# Patient Record
Sex: MALE | Race: WHITE | HISPANIC OR LATINO | Employment: FULL TIME | URBAN - METROPOLITAN AREA
[De-identification: names, ages, dates, MRNs, and addresses within clinical notes are randomized per-mention and may not be internally consistent; named-entity substitution may affect disease eponyms.]

---

## 2017-02-02 ENCOUNTER — ALLSCRIPTS OFFICE VISIT (OUTPATIENT)
Dept: OTHER | Facility: OTHER | Age: 27
End: 2017-02-02

## 2017-02-02 DIAGNOSIS — Z21 ASYMPTOMATIC HUMAN IMMUNODEFICIENCY VIRUS (HIV) INFECTION STATUS (HCC): ICD-10-CM

## 2017-02-02 DIAGNOSIS — R31.29 OTHER MICROSCOPIC HEMATURIA: ICD-10-CM

## 2017-05-04 ENCOUNTER — ALLSCRIPTS OFFICE VISIT (OUTPATIENT)
Dept: OTHER | Facility: OTHER | Age: 27
End: 2017-05-04

## 2017-05-04 DIAGNOSIS — Z21 ASYMPTOMATIC HUMAN IMMUNODEFICIENCY VIRUS (HIV) INFECTION STATUS (HCC): ICD-10-CM

## 2017-05-04 DIAGNOSIS — B20 HUMAN IMMUNODEFICIENCY VIRUS (HIV) DISEASE (HCC): ICD-10-CM

## 2017-05-04 DIAGNOSIS — E78.00 PURE HYPERCHOLESTEROLEMIA: ICD-10-CM

## 2017-05-04 DIAGNOSIS — Z11.3 ENCOUNTER FOR SCREENING FOR INFECTIONS WITH PREDOMINANTLY SEXUAL MODE OF TRANSMISSION: ICD-10-CM

## 2017-06-15 ENCOUNTER — ALLSCRIPTS OFFICE VISIT (OUTPATIENT)
Dept: OTHER | Facility: OTHER | Age: 27
End: 2017-06-15

## 2017-06-15 DIAGNOSIS — K76.89 OTHER SPECIFIED DISORDERS OF LIVER: ICD-10-CM

## 2017-06-15 DIAGNOSIS — B20 HUMAN IMMUNODEFICIENCY VIRUS (HIV) DISEASE (HCC): ICD-10-CM

## 2017-06-15 DIAGNOSIS — R94.5 ABNORMAL RESULTS OF LIVER FUNCTION STUDIES: ICD-10-CM

## 2017-10-05 ENCOUNTER — ALLSCRIPTS OFFICE VISIT (OUTPATIENT)
Dept: OTHER | Facility: OTHER | Age: 27
End: 2017-10-05

## 2017-10-05 DIAGNOSIS — B20 HUMAN IMMUNODEFICIENCY VIRUS (HIV) DISEASE (HCC): ICD-10-CM

## 2017-12-19 LAB — HBA1C MFR BLD HPLC: 5.2 %

## 2018-01-09 NOTE — PROGRESS NOTES
Assessment    1  HIV (human immunodeficiency virus infection) (V08) (Z21)   2  Hypercholesteremia (272 0) (E78 00)   3  Benign essential hypertension (401 1) (I10)   4  Microhematuria (599 72) (R31 29)    Plan    1  Tivicay 50 MG Oral Tablet; TAKE 1 TABLET BY MOUTH ONCE DAILY    2  (1) HIV-1 RNA QUANTITATIVE; [Do Not Release]; Status:Active; Requested   for:02Feb2017;     3  Descovy 200-25 MG Oral Tablet; TAKE 1 TABLET BY MOUTH DAILY   4  (1) CBC/PLT/DIFF; Status:Active; Requested for:02Feb2017;    5  (1) CD4-CD8 RATIO PROFILE; Status:Active; Requested for:02Feb2017;    6  (1) COMPREHENSIVE METABOLIC PANEL; Status:Active; Requested for:02Feb2017;    7  (Q) RPR (DX) W/REFL TITER AND CONFIRMATORY TESTING; Status:Active; Requested   for:02Feb2017;    8  Follow-up visit in 3 months Evaluation and Treatment  Follow-up  Status: Hold For -   Scheduling  Requested for: 02Feb2017    Discussion/Summary  Discussion Summary:   HIV-doing well on Tivicay and Descovy with a low level detectable viral load  He remains with 40 copies noted in the serum  This could be related to the fact these taken vitamin D at same time and this may interact  I told the patient to take the vitamin D and the fish oil and evening  The CD4 count remains over 1000  Stressed adherence with the patient  Recheck labs in 2 months and follow-up in 3 months    Dyslipidemia-improved with his cholesterol falling below 200 on Lipitor  His triglycerides remain quite high although they've improved  Continue Lipitor as per the primary service  There is no interaction with the Lipitor and his ART  Hypertension-asymptomatic  We'll refer back to primary M D  Microhematuria-recurrent  Asymptomatic  Discussed with patient in detail  Recommend patient see care with the urologist  He will proceed with making an appointment with urology  Patient's Capacity to Self-Care: Patient is able to Self-Care     Medication SE Review and Pt Understands Tx: Possible side effects of new medications were reviewed with the patient/guardian today  The treatment plan was reviewed with the patient/guardian  The patient/guardian understands and agrees with the treatment plan   Counseling Documentation With Imm: The patient was counseled regarding diagnostic results, instructions for management, prognosis, patient and family education, risks and benefits of treatment options, importance of compliance with treatment  Chief Complaint  Chief Complaint Free Text Note Form: Pt here for 6 mo f/u for HIV  Needs refills  Admits to smoking marijuana a few days a week  History of Present Illness  HPI: Routine follow-up for HIV  Patient claims almost 100% adherence with Tivicay and Descovy  He did miss one dose when he just completely forgot  He denies any notable side effects  He has been taking vitamin D and Fish oil at the same time of his ART  He is overall feeling well  He denies any notable side effects from his medications  Hospital Based Practices Required Assessment:   Pain Assessment   the patient states they do not have pain  Review of Systems  Complete-Male:   Constitutional: No fever or chills, feels well, no tiredness, no recent weight gain or weight loss  Cardiovascular: No complaints of slow heart rate, no fast heart rate, no chest pain, no palpitations, no leg claudication, no lower extremity  Respiratory: No complaints of shortness of breath, no wheezing, no cough, no SOB on exertion, no orthopnea or PND  Gastrointestinal: No complaints of abdominal pain, no constipation, no nausea or vomiting, no diarrhea or bloody stools  Genitourinary: No complaints of dysuria, no incontinence, no hesitancy, no nocturia, no genital lesion, no testicular pain  Musculoskeletal: No complaints of arthralgia, no myalgias, no joint swelling or stiffness, no limb pain or swelling  Active Problems    1  ADD (attention deficit disorder) (314 00) (F98 8)   2   Asthma (554 90) (J45 909)   3  Depression (311) (F32 9)   4  HIV (human immunodeficiency virus infection) (V08) (Z21)   5  Morbid obesity (278 01) (E66 01)   6  Morbid obesity (278 01) (E66 01)    Past Medical History    1  History of No significant past medical history    Surgical History    1  History of Oral Surgery Tooth Extraction   2  History of Tonsillectomy    Family History  Mother    1  Family history of Hypercholesteremia  Father    2  Family history of Diabetes   3  Family history of Hypercholesteremia   4  Family history of Hypertension  Family History Reviewed: The family history was reviewed and updated today  Social History    · Current every day smoker (305 1) (F17 200)   · Denied: History of Drug use   · Nicotine vapor product user (V49 89) (Z78 9)   · Recently stopped smoking   · Social alcohol use (Z78 9)  Social History Reviewed: The social history was reviewed and updated today  Current Meds   1  Albuterol AERS; Therapy: (Recorded:07Mwi7213) to Recorded   2  ClonazePAM 0 25 MG Oral Tablet Dispersible; TAKE 1 TABLET Bedtime; Therapy: (Recorded:02Oih1184) to Recorded   3  Lexapro 10 MG Oral Tablet; TAKE 1 TABLET DAILY; Therapy: (Recorded:47Req4255) to Recorded   4  Lipitor TABS; TAKE 1 TABLET DAILY; Therapy: (Recorded:39Tmn5367) to Recorded   5  Singulair 10 MG Oral Tablet; TAKE 1 TABLET DAILY AS DIRECTED; Therapy: (Recorded:99Pvl0764) to Recorded   6  Tivicay 50 MG Oral Tablet; TAKE 1 TABLET BY MOUTH ONCE DAILY; Therapy: (Recorded:91Tzi4400) to Recorded   7  Vyvanse 50 MG Oral Capsule; TAKE 1 CAPSULE DAILY IN THE MORNING; Therapy: (Recorded:17Pai0430) to Recorded    Allergies    1   No Known Drug Allergies    Vitals  Signs   Recorded: 13TVS4785 03:53PM   Temperature: 97 2 F, Tympanic  Heart Rate: 76  Respiration: 16  Systolic: 519  Diastolic: 90  BP Cuff Size: Large  Height: 5 ft 10 in  Weight: 293 lb 3 2 oz  BMI Calculated: 42 07  BSA Calculated: 2 46  Pain Scale: 0    Physical Exam    Constitutional   General appearance: No acute distress, well appearing and well nourished  Ears, Nose, Mouth, and Throat   Oropharynx: Normal with no erythema, edema, exudate or lesions  Pulmonary   Respiratory effort: No increased work of breathing or signs of respiratory distress  Auscultation of lungs: Clear to auscultation, equal breath sounds bilaterally, no wheezes, no rales, no rhonci  Cardiovascular   Auscultation of heart: Normal rate and rhythm, normal S1 and S2, without murmurs  Examination of extremities for edema and/or varicosities: Normal     Abdomen   Abdomen: Non-tender, no masses  Liver and spleen: No hepatomegaly or splenomegaly  Lymphatic   Palpation of lymph nodes in neck: No lymphadenopathy           Signatures   Electronically signed by : Erin Mortensen MD; Feb 2 2017  4:22PM EST                       (Author)

## 2018-01-10 NOTE — PROGRESS NOTES
History of Present Illness  HPI: Routine follow-up for HIV  Patient claims 100% adherence with Tivicay and Descovy  He denies any notable side effects  He has been taking magnesium but he takes the magnesium 12 hours  from the [address]  He has been losing weight intentionally  HIV Follow-up (Brief): The patient is being seen for a routine clinic follow-up of HIV infection  The patient is currently asymptomatic  Active Problems    1  Abnormal results of liver function studies (794 8) (R94 5)   2  ADD (attention deficit disorder) (314 00) (F98 8)   3  Asthma (493 90) (J45 909)   4  Benign essential hypertension (401 1) (I10)   5  Depression (311) (F32 9)   6  Encounter for screening examination for sexually transmitted disease (V74 5) (Z11 3)   7  HIV (human immunodeficiency virus infection) (V08) (B20)   8  HIV disease (042) (B20)   9  Hypercholesteremia (272 0) (E78 00)   10  Liver function abnormality (573 9) (K76 89)   11  Microhematuria (599 72) (R31 29)   12  Morbid obesity (278 01) (E66 01)   13  Morbid obesity (278 01) (E66 01)   14  Seasonal allergies (477 9) (J30 2)    Past Medical History    1  History of No significant past medical history    Surgical History    1  History of Oral Surgery Tooth Extraction   2  History of Tonsillectomy    Family History    1  Family history of Hypercholesteremia    2  Family history of Diabetes   3  Family history of Hypercholesteremia   4  Family history of Hypertension    Social History    · Current every day smoker (305 1) (F17 200)   · Denied: History of Drug use   · Marijuana   · Nicotine vapor product user (V49 89) (Z78 9)   · Recently stopped smoking   · Social alcohol use (Z78 9)    Current Meds   1  Albuterol AERS; INHALE 1 TO 2 PUFFS EVERY 4 TO 6 HOURS AS NEEDED AND AS   DIRECTED; Therapy: (Recorded:04May2017) to Recorded   2  ClonazePAM 0 5 MG Oral Tablet; TAKE 1 TABLET AT BEDTIME AS NEEDED; Therapy: (Recorded:04May2017) to Recorded   3  Crestor 5 MG Oral Tablet (Rosuvastatin Calcium); TAKE 1 TABLET DAILY; Therapy: (Recorded:15Jun2017) to Recorded   4  Descovy 200-25 MG Oral Tablet; TAKE 1 TABLET BY MOUTH DAILY; Therapy: 73SDK1118 to (Evaluate:13Oct2017)  Requested for: 45LID7063; Last   Rx:15Jun2017 Ordered   5  Lexapro 10 MG Oral Tablet (Escitalopram Oxalate); TAKE 1 TABLET DAILY; Therapy: (Recorded:02Feb2017) to Recorded   6  Singulair 10 MG Oral Tablet (Montelukast Sodium); TAKE 1 TABLET DAILY AS DIRECTED; Therapy: (Recorded:02Feb2017) to Recorded   7  Tivicay 50 MG Oral Tablet; TAKE 1 TABLET BY MOUTH ONCE DAILY  Requested for:   36JSN3109; Last Rx:15Jun2017 Ordered   8  Vyvanse 50 MG Oral Capsule; TAKE 1 CAPSULE DAILY IN THE MORNING; Therapy: (Recorded:02Feb2017) to Recorded   9  Xyzal 5 MG TABS (Levocetirizine Dihydrochloride); TAKE 1 TABLET DAILY; Therapy: (Recorded:56Lzn3551) to Recorded    Allergies    1  No Known Drug Allergies    Vitals  Signs   Recorded: 70SVM4487 04:20PM   Temperature: 97 7 F  Heart Rate: 84  Respiration: 16  Systolic: 433  Diastolic: 90  Height: 5 ft 10 in  Weight: 277 lb   BMI Calculated: 39 75  BSA Calculated: 2 4  O2 Saturation: 99    Physical Exam    Constitutional   General appearance: No acute distress, well appearing and well nourished  Ears, Nose, Mouth, and Throat   Oropharynx: Normal with no erythema, edema, exudate or lesions  Pulmonary   Respiratory effort: No increased work of breathing or signs of respiratory distress  Auscultation of lungs: Clear to auscultation, equal breath sounds bilaterally, no wheezes, no rales, no rhonci  Cardiovascular   Auscultation of heart: Normal rate and rhythm, normal S1 and S2, without murmurs  Examination of extremities for edema and/or varicosities: Normal     Abdomen   Abdomen: Non-tender, no masses  Liver and spleen: No hepatomegaly or splenomegaly  Lymphatic   Palpation of lymph nodes in neck: No lymphadenopathy      Skin   Skin and subcutaneous tissue: Normal without rashes or lesions  Psychiatric   Orientation to person, place and time: Normal          Assessment    1  HIV disease (042) (B20)   2  Hypercholesteremia (272 0) (E78 00)   3  Liver function abnormality (573 9) (K76 89)   4  Morbid obesity (278 01) (E66 01)   5  Microhematuria (599 72) (R31 29)    Plan    1  Tivicay 50 MG Oral Tablet; TAKE 1 TABLET BY MOUTH ONCE DAILY   2  (1) CBC/PLT/DIFF; Status:Active; Requested NTH:27EFY4228;    3  (1) CD4-CD8 RATIO PROFILE; Status:Active; Requested [id number];    4  (1) COMPREHENSIVE METABOLIC PANEL; Status:Active; Requested for:53Byp3669;     5  Descovy 200-25 MG Oral Tablet; TAKE 1 TABLET BY MOUTH DAILY   6  (1) HIV-1 RNA QUANTITATIVE; [Do Not Release]; Status:Active; Requested WEF:17LQN7575;       7  Follow-up visit in 3 months Evaluation and Treatment  Follow-up  Status: Hold For -   Scheduling  Requested for: 31JDN1271    Discussion/Summary  Discussion Summary:   HIV-doing well on Tivicay Descovy with a CD4 count of greater than 1000 and  He did have another blip in the viral load up to 30 copies  Therefore we'll continue to follow up every 3 months  Recheck labs in 2 months and follow-up in 3 months  Stress adherence  Continue ART for now    Morbid obesity-patient is losing weight  This is improved his blood pressure control and has dyslipidemia  Encouraged him to continue weight loss  Microhematuria-once again stressed the importance of urology evaluation  The patient will set up an appointment  Dyslipidemia-lipid panel is improving with weight loss and dietary changes  He is also on Crestor  Continue treatment for now per the primary service  Liver function test abnormalities-probable steatohepatitis  With weight loss the patient's liver tests have improved and now normalized  Continue weight loss and monitor the LFTs     Counseling Documentation With Imm: The patient was counseled regarding diagnostic results, instructions for management, prognosis, risks and benefits of treatment options, importance of compliance with treatment  Patient's Capacity to Self-Care: Patient is able to Self-Care  Medication SE Review and Pt Understands Tx: Possible side effects of new medications were reviewed with the patient/guardian today        Signatures   Electronically signed by : Sky Alva MD; Oct  5 2017  4:41PM EST                       (Author)

## 2018-01-10 NOTE — PROGRESS NOTES
History of Present Illness  HPI: Routine follow-up for HIV  Patient claims 100% adherence with Tivicay and Descovy  He denies any notable side effects  He is feeling well overall other than some mild allergic symptoms  HIV Follow-up (Brief): no fever no lethargy no depression no night sweats no weight loss no decreased appetite no cough no shortness of breath no skin lesions no thrush no nausea no vomiting no diarrhea      Active Problems    1  ADD (attention deficit disorder) (314 00) (F98 8)   2  Asthma (493 90) (J45 909)   3  Benign essential hypertension (401 1) (I10)   4  Depression (311) (F32 9)   5  HIV (human immunodeficiency virus infection) (V08) (Z21)   6  Hypercholesteremia (272 0) (E78 00)   7  Microhematuria (599 72) (R31 29)   8  Morbid obesity (278 01) (E66 01)   9  Morbid obesity (278 01) (E66 01)    Past Medical History    1  History of No significant past medical history    Surgical History    1  History of Oral Surgery Tooth Extraction   2  History of Tonsillectomy    Family History    1  Family history of Hypercholesteremia    2  Family history of Diabetes   3  Family history of Hypercholesteremia   4  Family history of Hypertension    Social History    · Current every day smoker (305 1) (F17 200)   · Denied: History of Drug use   · Marijuana   · Nicotine vapor product user (V49 89) (Z78 9)   · Recently stopped smoking   · Social alcohol use (Z78 9)    Current Meds   1  Albuterol AERS; Therapy: (Recorded:16Feb2015) to Recorded   2  ClonazePAM 0 25 MG Oral Tablet Dispersible; TAKE 1 TABLET Bedtime; Therapy: (Recorded:02Feb2017) to Recorded   3  Descovy 200-25 MG Oral Tablet; TAKE 1 TABLET BY MOUTH DAILY; Therapy: 91HDJ5239 to (Evaluate:02Jun2017)  Requested for: 76ZMD6228; Last   Rx:02Feb2017 Ordered   4  Lexapro 10 MG Oral Tablet (Escitalopram Oxalate); TAKE 1 TABLET DAILY; Therapy: (Recorded:02Feb2017) to Recorded   5   Lipitor TABS (Atorvastatin Calcium); TAKE 1 TABLET DAILY; Therapy: (Recorded:02Feb2017) to Recorded   6  Singulair 10 MG Oral Tablet (Montelukast Sodium); TAKE 1 TABLET DAILY AS DIRECTED; Therapy: (Recorded:02Feb2017) to Recorded   7  Tivicay 50 MG Oral Tablet; TAKE 1 TABLET BY MOUTH ONCE DAILY  Requested for:   22REN2596; Last Rx:02Feb2017 Ordered   8  Vyvanse 50 MG Oral Capsule; TAKE 1 CAPSULE DAILY IN THE MORNING; Therapy: (Recorded:02Feb2017) to Recorded    Allergies    1  No Known Drug Allergies    Physical Exam    Constitutional   General appearance: No acute distress, well appearing and well nourished  Ears, Nose, Mouth, and Throat   Oropharynx: Normal with no erythema, edema, exudate or lesions  Pulmonary   Respiratory effort: No increased work of breathing or signs of respiratory distress  Auscultation of lungs: Clear to auscultation, equal breath sounds bilaterally, no wheezes, no rales, no rhonci  Cardiovascular   Auscultation of heart: Normal rate and rhythm, normal S1 and S2, without murmurs  Examination of extremities for edema and/or varicosities: Normal     Abdomen   Abdomen: Non-tender, no masses  Liver and spleen: No hepatomegaly or splenomegaly  Lymphatic   Palpation of lymph nodes in neck: No lymphadenopathy  Assessment    1  HIV disease (042) (B20)   2  Encounter for screening examination for sexually transmitted disease (V74 5) (Z11 3)   3  Hypercholesteremia (272 0) (E78 00)   4  Microhematuria (599 72) (R31 29)    Plan    1  Follow-up visit in 6 weeks Evaluation and Treatment  Follow-up  Status: Hold For -   Scheduling  Requested for: 16WEO0161    2  (1) CHLAMYDIA/GC AMPLIFIED DNA, PCR; Source:Urine, Unspecified Source;   Status:Active; Requested for:04May2017;     3  (1) CBC/PLT/DIFF; Status:Active; Requested for:04May2017;    4  (1) CD4-CD8 RATIO PROFILE; Status:Active; Requested for:04May2017;     5  (1) COMPREHENSIVE METABOLIC PANEL; Status:Active;  Requested for:04May2017;    6  (1) HIV-1 RNA QUANTITATIVE; [Do Not Release]; Status:Active; Requested   for:04May2017;    7  (1) RPR; Status:Active; Requested for:04May2017;    8  (1) URINALYSIS WITH MICROSCOPIC; Status:Active; Requested for:04May2017;     9  (1) LIPID PANEL, FASTING; Status:Active; Requested TEP:[id number];     Discussion/Summary  Discussion Summary:   HIV-doing well on Tivicay and Descovy with a CD4 count of almost 1500  However the viral load has increased slightly to just above 100  Reviewed all his medications and there does not seem to be any drug interactions  The patient insists he is being completely adherent  We'll recheck labs in 4 weeks and follow-up in 6 weeks  If viral load increases to greater than 200, we'll consider resistance testing versus changing ART  Hypercholesterolemia-patient now on Lipitor  We'll recheck lipid profile with next set of labs  Follow up with primary  Microhematuria-patient is still not seen urology  Stressed the importance of a urology evaluation  The patient insists that he will see urology soon  Counseling Documentation With Imm: The patient was counseled regarding diagnostic results, instructions for management, prognosis, risks and benefits of treatment options, importance of compliance with treatment  Patient's Capacity to Self-Care: Patient is able to Self-Care  Medication SE Review and Pt Understands Tx: Possible side effects of new medications were reviewed with the patient/guardian today  The treatment plan was reviewed with the patient/guardian   The patient/guardian understands and agrees with the treatment plan      Signatures   Electronically signed by : Sherly Taveras MD; May  4 2017  4:07PM EST                       (Author)

## 2018-01-12 NOTE — PROGRESS NOTES
History of Present Illness  HPI: Routine follow-up for HIV  Patient claims 100% adherence with Tivicay and Descovy  He denies any notable side effects  He is feeling well overall  He changed the Lipitor to Crestor recently  He is no longer taking fish oil  HIV Follow-up (Brief): no fever no lethargy no depression no night sweats no weight loss no decreased appetite no cough no shortness of breath no thrush no nausea no vomiting no diarrhea      Active Problems    1  ADD (attention deficit disorder) (314 00) (F98 8)   2  Asthma (493 90) (J45 909)   3  Benign essential hypertension (401 1) (I10)   4  Depression (311) (F32 9)   5  Encounter for screening examination for sexually transmitted disease (V74 5) (Z11 3)   6  HIV (human immunodeficiency virus infection) (V08) (Z21)   7  HIV disease (042) (B20)   8  Hypercholesteremia (272 0) (E78 00)   9  Microhematuria (599 72) (R31 29)   10  Morbid obesity (278 01) (E66 01)   11  Morbid obesity (278 01) (E66 01)   12  Seasonal allergies (477 9) (J30 2)    Past Medical History    1  History of No significant past medical history    Surgical History    1  History of Oral Surgery Tooth Extraction   2  History of Tonsillectomy    Family History    1  Family history of Hypercholesteremia    2  Family history of Diabetes   3  Family history of Hypercholesteremia   4  Family history of Hypertension    Social History    · Current every day smoker (305 1) (F17 200)   · Denied: History of Drug use   · Marijuana   · Nicotine vapor product user (V49 89) (Z78 9)   · Recently stopped smoking   · Social alcohol use (Z78 9)    Current Meds   1  Albuterol AERS; INHALE 1 TO 2 PUFFS EVERY 4 TO 6 HOURS AS NEEDED AND AS   DIRECTED; Therapy: (Recorded:73Vuu9871) to Recorded   2  ClonazePAM 0 5 MG Oral Tablet; TAKE 1 TABLET AT BEDTIME AS NEEDED; Therapy: (Recorded:04May2017) to Recorded   3  Descovy 200-25 MG Oral Tablet; TAKE 1 TABLET BY MOUTH DAILY;    Therapy: [id number] to (Evaluate:02Jun2017)  Requested for: [id number]; Last   Rx:02Feb2017 Ordered   4  Lexapro 10 MG Oral Tablet (Escitalopram Oxalate); TAKE 1 TABLET DAILY; Therapy: (Recorded:02Feb2017) to Recorded   5  Lipitor TABS (Atorvastatin Calcium); TAKE 1 TABLET DAILY; Therapy: (Recorded:02Feb2017) to Recorded   6  Singulair 10 MG Oral Tablet (Montelukast Sodium); TAKE 1 TABLET DAILY AS DIRECTED; Therapy: (Recorded:02Feb2017) to Recorded   7  Tivicay 50 MG Oral Tablet; TAKE 1 TABLET BY MOUTH ONCE DAILY  Requested for:   [id number]; Last Rx:02Feb2017 Ordered   8  Vascepa 1 GM Oral Capsule; TAKE 2 CAPSULE Twice daily; Therapy: (Recorded:04May2017) to Recorded   9  Vyvanse 50 MG Oral Capsule; TAKE 1 CAPSULE DAILY IN THE MORNING; Therapy: (Recorded:02Feb2017) to Recorded   10  Xyzal 5 MG Oral Tablet (Levocetirizine Dihydrochloride); TAKE 1 TABLET DAILY; Therapy: (Recorded:04May2017) to Recorded    Allergies    1  No Known Drug Allergies    Vitals  Signs   Recorded: 88HDT3194 02:42PM   Temperature: 97 6 F  Heart Rate: 87  Respiration: 16  Systolic: 501  Diastolic: 92  Height: 5 ft 10 in  Weight: 293 lb 12 8 oz  BMI Calculated: 42 16  BSA Calculated: 2 46  O2 Saturation: 98    Physical Exam    Constitutional   General appearance: No acute distress, well appearing and well nourished  Ears, Nose, Mouth, and Throat   Oropharynx: Normal with no erythema, edema, exudate or lesions  Pulmonary   Respiratory effort: No increased work of breathing or signs of respiratory distress  Auscultation of lungs: Clear to auscultation, equal breath sounds bilaterally, no wheezes, no rales, no rhonci  Cardiovascular   Auscultation of heart: Normal rate and rhythm, normal S1 and S2, without murmurs  Abdomen   Abdomen: Non-tender, no masses  Liver and spleen: No hepatomegaly or splenomegaly  Lymphatic   Palpation of lymph nodes in neck: No lymphadenopathy  Assessment    1  HIV disease (042) (B20)   2  Hypercholesteremia (272 0) (E78 00)   3  Microhematuria (599 72) (R31 29)   4  Liver function abnormality (573 9) (K76 89)    Plan    1  (1) CBC/ PLT (NO DIFF); Status:Active; Requested XTR:[id number];    2  (1) CD4-CD8 RATIO PROFILE; Status:Active; Requested TAR:[id number];    3  (1) COMPREHENSIVE METABOLIC PANEL; Status:Active; Requested GBI:[id number];    4  (1) RPR; Status:Active; Requested WWC:[id number];     5  (1) HIV-1 RNA QUANTITATIVE; [Do Not Release]; Status:Active; Requested   DJO:[id number];     6  (1) ACUTE HEPATITIS PANEL; Status:Active; Requested CAR:[id number];     Discussion/Summary  Discussion Summary:   HIV-doing well on Tivicay and Descovy with an undetectable viral load and CD4 count of greater than 1000  Continue ART, recheck labs in 2 months, follow-up in 3 months  If he remains undetectable, will transition back to every 6 months  Perhaps the fish oil was binding with a medication such as Tivicay, hence the viral load blip in the past    Mild liver function test abnormalities-likely secondary to steatohepatitis  However will check a hepatitis panel  Patient working on weight loss  Dyslipidemia-the cholesterol has improved  Still with significant hypertriglyceridemia  Continue Crestor and follow up with primary    Microhematuria-patient holding on urology evaluation until insurance change which is pending  Stressed the importance of urology evaluation  Counseling Documentation With Imm: The patient was counseled regarding diagnostic results, instructions for management, prognosis, risks and benefits of treatment options, importance of compliance with treatment  Patient's Capacity to Self-Care: Patient is able to Self-Care  Medication SE Review and Pt Understands Tx: Possible side effects of new medications were reviewed with the patient/guardian today  The treatment plan was reviewed with the patient/guardian   The patient/guardian understands and agrees with the treatment plan Signatures   Electronically signed by : Alexander Damon MD; José Antonio 15 2017  2:51PM EST                       (Author)    Electronically signed by : Alexander Damon MD; José Antonio 15 2017  2:52PM EST                       (Author)    Electronically signed by : Alexander Damon MD; José Antonio 15 2017  2:53PM EST                       (Author)    Electronically signed by : Alexander Damon MD; José Antonio 15 2017  2:54PM EST                       (Author)

## 2018-01-13 VITALS
TEMPERATURE: 97.6 F | BODY MASS INDEX: 42.06 KG/M2 | WEIGHT: 293.8 LBS | OXYGEN SATURATION: 98 % | HEIGHT: 70 IN | SYSTOLIC BLOOD PRESSURE: 154 MMHG | HEART RATE: 87 BPM | RESPIRATION RATE: 16 BRPM | DIASTOLIC BLOOD PRESSURE: 92 MMHG

## 2018-01-13 VITALS
SYSTOLIC BLOOD PRESSURE: 138 MMHG | RESPIRATION RATE: 16 BRPM | WEIGHT: 277 LBS | BODY MASS INDEX: 39.65 KG/M2 | HEART RATE: 84 BPM | OXYGEN SATURATION: 99 % | TEMPERATURE: 97.7 F | DIASTOLIC BLOOD PRESSURE: 90 MMHG | HEIGHT: 70 IN

## 2018-01-13 VITALS
RESPIRATION RATE: 16 BRPM | HEART RATE: 85 BPM | DIASTOLIC BLOOD PRESSURE: 82 MMHG | HEIGHT: 70 IN | BODY MASS INDEX: 42.26 KG/M2 | TEMPERATURE: 97.9 F | WEIGHT: 295.2 LBS | SYSTOLIC BLOOD PRESSURE: 136 MMHG

## 2018-01-14 VITALS
TEMPERATURE: 97.2 F | DIASTOLIC BLOOD PRESSURE: 90 MMHG | SYSTOLIC BLOOD PRESSURE: 136 MMHG | WEIGHT: 293.2 LBS | HEART RATE: 76 BPM | RESPIRATION RATE: 16 BRPM | BODY MASS INDEX: 41.97 KG/M2 | HEIGHT: 70 IN

## 2018-01-30 RX ORDER — ROSUVASTATIN CALCIUM 5 MG/1
1 TABLET, COATED ORAL DAILY
COMMUNITY
End: 2018-09-13 | Stop reason: ALTCHOICE

## 2018-01-30 RX ORDER — MONTELUKAST SODIUM 10 MG/1
1 TABLET ORAL DAILY
COMMUNITY
End: 2020-03-12 | Stop reason: ALTCHOICE

## 2018-01-30 RX ORDER — ESCITALOPRAM OXALATE 10 MG/1
1 TABLET ORAL DAILY
COMMUNITY
End: 2020-07-07 | Stop reason: ALTCHOICE

## 2018-01-30 RX ORDER — CLONAZEPAM 0.5 MG/1
1-2 TABLET ORAL
COMMUNITY
End: 2019-08-15 | Stop reason: ALTCHOICE

## 2018-01-30 RX ORDER — LEVOCETIRIZINE DIHYDROCHLORIDE 5 MG/1
1 TABLET, FILM COATED ORAL DAILY
COMMUNITY

## 2018-01-30 RX ORDER — ALBUTEROL SULFATE 90 UG/1
1-2 AEROSOL, METERED RESPIRATORY (INHALATION) EVERY 6 HOURS PRN
COMMUNITY

## 2018-01-30 RX ORDER — CALCIUM CARBONATE 260MG(650)
1 TABLET,CHEWABLE ORAL
COMMUNITY
End: 2018-05-17

## 2018-02-01 ENCOUNTER — OFFICE VISIT (OUTPATIENT)
Dept: INFECTIOUS DISEASES | Facility: CLINIC | Age: 28
End: 2018-02-01
Payer: COMMERCIAL

## 2018-02-01 VITALS
DIASTOLIC BLOOD PRESSURE: 88 MMHG | WEIGHT: 287.4 LBS | HEART RATE: 74 BPM | SYSTOLIC BLOOD PRESSURE: 136 MMHG | RESPIRATION RATE: 14 BRPM | BODY MASS INDEX: 41.14 KG/M2 | HEIGHT: 70 IN | TEMPERATURE: 98 F

## 2018-02-01 DIAGNOSIS — E66.01 MORBID OBESITY (HCC): ICD-10-CM

## 2018-02-01 DIAGNOSIS — B20 HIV (HUMAN IMMUNODEFICIENCY VIRUS INFECTION) (HCC): Primary | ICD-10-CM

## 2018-02-01 DIAGNOSIS — J06.9 UPPER RESPIRATORY INFECTION, VIRAL: ICD-10-CM

## 2018-02-01 DIAGNOSIS — R31.29 MICROHEMATURIA: ICD-10-CM

## 2018-02-01 PROCEDURE — 99214 OFFICE O/P EST MOD 30 MIN: CPT | Performed by: INTERNAL MEDICINE

## 2018-02-01 RX ORDER — RISPERIDONE 0.25 MG/1
TABLET, FILM COATED ORAL
Refills: 0 | COMMUNITY
Start: 2017-11-29 | End: 2019-11-14 | Stop reason: ALTCHOICE

## 2018-02-01 NOTE — PROGRESS NOTES
Progress Note - Infectious Disease   Asa Modi 32 y o  male MRN: [id number]  Unit/Bed#:  Encounter: [id number]      Impression/Plan:  1  HIV-doing well on Tivicay Descovy with a CD4 count of greater than 1000 and  He did have another blip in the viral load up to 30 copies  Therefore we'll continue to follow up every 3 months  Recheck labs in 2 months and follow-up in 3 months  Stress adherence  Continue ART for now     2  Morbid Obesity-patient is losing weight  He lost 10 lb since his last visit  His BMI is now below 40  This has improved his blood pressure control and has dyslipidemia  Encouraged him to continue weight loss      3  Microhematuria-all of his follow-up UAs have been without hematuria, and the patient's primary does not feel he needs to see Urology      4   Viral upper respiratory infection-no clinical evidence of a lower respiratory tract infection  The patient is improving  Symptomatic treatment for now  Patient was provided medication, adherence and prevention education    Subjective:  Routine follow-up for HIV  Patient claims 100% adherence with Tivicay and Descovy  He did however missed a few doses when he went on vacation and forgot to bring his medications  Patient denies any notable side effects  Overall the feeling well  The patient denies any fever chills or sweats, denies any nausea vomiting or diarrhea  He is recovering from a viral upper respiratory infection is feeling better but continues to have some rhinorrhea nasal congestion and a cough  He is not having any shortness of breath  ROS: A complete 12 point ROS is negative other than that noted in the HPI    Objective:  Vitals:  Vitals:    02/01/18 1538   BP: 136/88   Pulse: 74   Resp: 14   Temp: 98 °F (36 7 °C)   Weight: 130 kg (287 lb 6 4 oz)   Height: 5' 10" (1 778 m)       Physical Exam:   General Appearance:  Alert, interactive, appearing well,  nontoxic, no acute distress     Neck:   Supple without lymphadenopathy, no thyromegaly or masses   Throat: Oropharynx moist without lesions  Lungs:   Clear to auscultation bilaterally; no wheezes, rhonchi or rales; respirations unlabored   Heart:  RRR; no murmur, rub or gallop   Abdomen:   Soft, non-tender, non-distended, positive bowel sounds  Extremities: No clubbing, cyanosis or edema  Extremities with good range of motion   Skin: No new rashes or lesions  No draining wounds noted         Lab Results   Component Value Date     02/22/2016    K 4 4 02/22/2016     02/22/2016    CO2 29 02/22/2016    ANIONGAP 8 02/22/2016    BUN 12 02/22/2016    CREATININE 1 02 02/22/2016    GLUCOSE 100 02/22/2016    CALCIUM 9 0 02/22/2016    AST 33 02/22/2016    ALT 71 02/22/2016    ALKPHOS 60 02/22/2016    PROT 8 8 (H) 02/22/2016    BILITOT 0 90 02/22/2016    EGFR >60 0 02/22/2016     Lab Results   Component Value Date    WBC 10 23 (H) 02/22/2016    HGB 15 6 02/22/2016    HGB 15 4 02/22/2016    HCT 45 4 02/22/2016    HCT 46 5 02/22/2016    MCV 87 02/22/2016    MCV 90 02/22/2016     02/22/2016     02/22/2016     No results found for: HEPCAB  No results found for: HAV, HEPAIGM, HEPBIGM, HEPBCAB, HBEAG, HEPCAB  Lab Results   Component Value Date    RPR Nonreactive 07/06/2015     Labs from [facility] labs    Creatinine 0 96, liver function tests normal, CD4 1097, white blood cell count 11 1, HIV RNA 31 copies    Labs, Imaging, & Other studies:   All pertinent labs and imaging studies were personally reviewed      Current Outpatient Prescriptions:     emtricitabine-tenofovir AF (DESCOVY) 200-25 MG tablet, Take 1 tablet by mouth daily, Disp: , Rfl:     albuterol (PROVENTIL HFA,VENTOLIN HFA) 90 mcg/act inhaler, Inhale 1-2 puffs, Disp: , Rfl:     clonazePAM (KlonoPIN) 0 5 mg tablet, Take 1 tablet by mouth, Disp: , Rfl:     dolutegravir (TIVICAY) 50 MG TABS, Take 1 tablet by mouth daily, Disp: , Rfl:     escitalopram (LEXAPRO) 10 mg tablet, Take 1 tablet by mouth daily, Disp: , Rfl:     levocetirizine (XYZAL) 5 MG tablet, Take 1 tablet by mouth daily, Disp: , Rfl:     lisdexamfetamine (VYVANSE) 50 MG capsule, Take 1 capsule by mouth Daily, Disp: , Rfl:     Magnesium Citrate 100 MG TABS, Take 1 tablet by mouth, Disp: , Rfl:     montelukast (SINGULAIR) 10 mg tablet, Take 1 tablet by mouth daily, Disp: , Rfl:     rosuvastatin (CRESTOR) 5 mg tablet, Take 1 tablet by mouth daily, Disp: , Rfl:

## 2018-04-24 ENCOUNTER — TELEPHONE (OUTPATIENT)
Dept: INFECTIOUS DISEASES | Facility: CLINIC | Age: 28
End: 2018-04-24

## 2018-04-24 NOTE — TELEPHONE ENCOUNTER
Called Chelsie Lobo to find out if he had his labs done  He did not and he was going to be out of town next week so he needed to reschedule to 5/17   This will give him enough time to get his labs done

## 2018-05-17 ENCOUNTER — OFFICE VISIT (OUTPATIENT)
Dept: INFECTIOUS DISEASES | Facility: CLINIC | Age: 28
End: 2018-05-17
Payer: COMMERCIAL

## 2018-05-17 VITALS
DIASTOLIC BLOOD PRESSURE: 94 MMHG | HEIGHT: 70 IN | WEIGHT: 301 LBS | TEMPERATURE: 98.3 F | BODY MASS INDEX: 43.09 KG/M2 | RESPIRATION RATE: 16 BRPM | SYSTOLIC BLOOD PRESSURE: 144 MMHG | HEART RATE: 85 BPM

## 2018-05-17 DIAGNOSIS — I10 BENIGN ESSENTIAL HYPERTENSION: ICD-10-CM

## 2018-05-17 DIAGNOSIS — E66.01 MORBID OBESITY (HCC): ICD-10-CM

## 2018-05-17 DIAGNOSIS — B20 HIV DISEASE (HCC): ICD-10-CM

## 2018-05-17 DIAGNOSIS — L73.9 FOLLICULITIS: Primary | ICD-10-CM

## 2018-05-17 PROBLEM — R94.5 LIVER FUNCTION ABNORMALITY: Status: ACTIVE | Noted: 2017-06-15

## 2018-05-17 PROCEDURE — 99215 OFFICE O/P EST HI 40 MIN: CPT | Performed by: INTERNAL MEDICINE

## 2018-05-17 RX ORDER — DOXYCYCLINE 100 MG/1
100 TABLET ORAL 2 TIMES DAILY
Qty: 28 TABLET | Refills: 0 | Status: SHIPPED | OUTPATIENT
Start: 2018-05-17 | End: 2018-05-31

## 2018-05-17 NOTE — PROGRESS NOTES
Progress Note - Infectious Disease   Arin Quinones 29 y o  male MRN: [id number]  Unit/Bed#:  Encounter: [id number]      Impression/Plan:  1  HIV-doing well on Tivicay Descovy with a CD4 count of greater than 1000 and, but his viral load is increased to 126 copies    Therefore we'll continue to follow up every 3 months  Recheck labs in 2 months and follow-up in 3 months  Stress adherence  Continue ART for now     2  Morbid Obesity-he has started to gain weight again  His triglycerides remain quite high  Stressed the importance of diet and weight loss  3   Hypertension-asymptomatic at this time  Stressed the importance of close follow-up of his blood pressure and close follow-up with his primary care physician to manage this problem  He has appointment with his primary care physician in 30 min  4   Folliculitis-likely staphylococcal   This is been a recurrent problem with this patient  Will give a trial of doxycycline 100 mg p o  q 12 hours times 14 days  Follow-up with Dermatology  Patient was provided medication, adherence and prevention education    Subjective:  Routine follow-up for HIV  Patient claims 100% adherence with Tivicay and Descovy  Patient denies any notable side effects  Overall the feeling well  The patient denies any fever chills or sweats, denies any nausea vomiting or diarrhea, denies any cough or shortness of breath  He is not having any headache or chest pain or visual changes  ROS: A complete 12 point ROS is negative other than that noted in the HPI    Objective:  Vitals:  Vitals:    05/17/18 1605   BP: 144/94   Pulse: 85   Resp: 16   Temp: 98 3 °F (36 8 °C)   Weight: (!) 137 kg (301 lb)   Height: 5' 10" (1 778 m)       Physical Exam:   General Appearance:  Alert, interactive, appearing well,  nontoxic, no acute distress  Neck:   Supple without lymphadenopathy, no thyromegaly or masses   Throat: Oropharynx moist without lesions      Lungs:   Clear to auscultation bilaterally; no wheezes, rhonchi or rales; respirations unlabored   Heart:  RRR; no murmur, rub or gallop   Abdomen:   Soft, non-tender, non-distended, positive bowel sounds  Extremities: No clubbing, cyanosis or edema   Skin: Numerous acneiform lesions on the posterior scalp           Labs, Imaging, & Other studies:   All pertinent labs and imaging studies were personally reviewed    White blood cell count 10 6, CD4 1388, creatinine 1 13, liver function tests normal, HIV  copies      Current Outpatient Prescriptions:     albuterol (PROVENTIL HFA,VENTOLIN HFA) 90 mcg/act inhaler, Inhale 1-2 puffs, Disp: , Rfl:     clonazePAM (KlonoPIN) 0 5 mg tablet, Take 1-2 tablets by mouth daily at bedtime 1-2 tabs every night , Disp: , Rfl:     dolutegravir (TIVICAY) 50 MG TABS, Take 1 tablet (50 mg total) by mouth daily, Disp: 90 tablet, Rfl: 1    emtricitabine-tenofovir AF (DESCOVY) 200-25 MG tablet, Take 1 tablet by mouth daily, Disp: 90 tablet, Rfl: 1    escitalopram (LEXAPRO) 10 mg tablet, Take 1 tablet by mouth daily, Disp: , Rfl:     levocetirizine (XYZAL) 5 MG tablet, Take 1 tablet by mouth daily, Disp: , Rfl:     lisdexamfetamine (VYVANSE) 50 MG capsule, Take 1 capsule by mouth Daily, Disp: , Rfl:     Magnesium Citrate 100 MG TABS, Take 1 tablet by mouth, Disp: , Rfl:     montelukast (SINGULAIR) 10 mg tablet, Take 1 tablet by mouth daily, Disp: , Rfl:     risperiDONE (RisperDAL) 0 25 mg tablet, TAKE 1 TABLET BY MOUTH EVERY NIGHT, Disp: , Rfl: 0    rosuvastatin (CRESTOR) 5 mg tablet, Take 1 tablet by mouth daily, Disp: , Rfl:

## 2018-07-14 LAB — HBA1C MFR BLD HPLC: 5.2 %

## 2018-09-13 ENCOUNTER — OFFICE VISIT (OUTPATIENT)
Dept: INFECTIOUS DISEASES | Facility: CLINIC | Age: 28
End: 2018-09-13
Payer: COMMERCIAL

## 2018-09-13 VITALS
SYSTOLIC BLOOD PRESSURE: 132 MMHG | TEMPERATURE: 97.7 F | BODY MASS INDEX: 44.72 KG/M2 | HEART RATE: 81 BPM | DIASTOLIC BLOOD PRESSURE: 82 MMHG | WEIGHT: 312.4 LBS | RESPIRATION RATE: 16 BRPM | HEIGHT: 70 IN

## 2018-09-13 DIAGNOSIS — L73.9 FOLLICULITIS: ICD-10-CM

## 2018-09-13 DIAGNOSIS — E66.01 MORBID OBESITY (HCC): ICD-10-CM

## 2018-09-13 DIAGNOSIS — I10 BENIGN ESSENTIAL HYPERTENSION: ICD-10-CM

## 2018-09-13 DIAGNOSIS — B20 HIV (HUMAN IMMUNODEFICIENCY VIRUS INFECTION) (HCC): ICD-10-CM

## 2018-09-13 DIAGNOSIS — B20 HIV DISEASE (HCC): ICD-10-CM

## 2018-09-13 DIAGNOSIS — R31.29 MICROHEMATURIA: ICD-10-CM

## 2018-09-13 PROCEDURE — 99214 OFFICE O/P EST MOD 30 MIN: CPT | Performed by: INTERNAL MEDICINE

## 2018-09-13 NOTE — PROGRESS NOTES
Progress Note - Infectious Disease   Kasie Nephew 29 y o  male MRN: [id number]  Unit/Bed#:  Encounter: [id number]      Impression/Plan:  1   HIV-doing well on Tivicay Descovy with a CD4 count of greater than 1000 and a viral load that is decreased 27 copies  Recheck labs in 5 months and follow up in 6 months  Patient may wish to change to [facility] next visit      2   Morbid Obesity-continues to gain weight  Stressed the importance of diet and weight loss      3  Hypertension-asymptomatic at this time  Blood pressure is now relatively normal   Stressed the importance of close follow-up of his blood pressure and close follow-up with his primary care physician to manage this problem       4  Folliculitis-improved but not completely resolved  Had some GI upset with doxycycline  The patient wishes to see dermatologist    Altamese Gain to monitor from infectious disease standpoint    5  Microhematuria-minimal on last check  Will recheck a UA  Patient was provided medication, adherence and prevention education    Subjective:  Routine follow-up for HIV  Patient claims 100% adherence with   Tivicay and Descovy  Patient denies any notable side effects  Overall the feeling well  The patient denies any fever chills or sweats, denies any nausea vomiting or diarrhea, denies any cough or shortness of breath  ROS: A complete 12 point ROS is negative other than that noted in the HPI    Followup portions patient history reviewed and updated as: Allergies, current medications, past medical history, past social history, past surgical history, and the problem list    Objective:  Vitals:  Vitals:    09/13/18 0817   BP: 132/82   Pulse: 81   Resp: 16   Temp: 97 7 °F (36 5 °C)   Weight: (!) 142 kg (312 lb 6 4 oz)   Height: 5' 10" (1 778 m)       Physical Exam:   General Appearance:  Alert, interactive, appearing well,  nontoxic, no acute distress     Neck:   Supple without lymphadenopathy, no thyromegaly or masses Throat: Oropharynx moist without lesions  Lungs:   Clear to auscultation bilaterally; no wheezes, rhonchi or rales; respirations unlabored   Heart:  RRR; no murmur, rub or gallop   Abdomen:   Soft, non-tender, non-distended, positive bowel sounds  Extremities: No clubbing, cyanosis or edema   Skin: No new rashes or lesions  No draining wounds noted           Labs, Imaging, & Other studies:   All pertinent labs and imaging studies were personally reviewed    Creatinine 1 01, CD4 15 20, liver function tests normal, HIV RNA 27    Current Outpatient Prescriptions:     albuterol (PROVENTIL HFA,VENTOLIN HFA) 90 mcg/act inhaler, Inhale 1-2 puffs, Disp: , Rfl:     atorvastatin (LIPITOR) 20 mg tablet, TK 1 T PO D, Disp: , Rfl: 1    clonazePAM (KlonoPIN) 0 5 mg tablet, Take 1-2 tablets by mouth daily at bedtime 1-2 tabs every night , Disp: , Rfl:     dolutegravir (TIVICAY) 50 MG TABS, Take 1 tablet (50 mg total) by mouth daily, Disp: 90 tablet, Rfl: 1    emtricitabine-tenofovir AF (DESCOVY) 200-25 MG tablet, Take 1 tablet by mouth daily, Disp: 90 tablet, Rfl: 1    escitalopram (LEXAPRO) 10 mg tablet, Take 1 tablet by mouth daily, Disp: , Rfl:     levocetirizine (XYZAL) 5 MG tablet, Take 1 tablet by mouth daily, Disp: , Rfl:     lisdexamfetamine (VYVANSE) 50 MG capsule, Take 1 capsule by mouth Daily, Disp: , Rfl:     montelukast (SINGULAIR) 10 mg tablet, Take 1 tablet by mouth daily, Disp: , Rfl:     risperiDONE (RisperDAL) 0 25 mg tablet, TAKE 1 TABLET BY MOUTH EVERY NIGHT, Disp: , Rfl: 0    rosuvastatin (CRESTOR) 5 mg tablet, Take 1 tablet by mouth daily, Disp: , Rfl:

## 2019-02-21 ENCOUNTER — OFFICE VISIT (OUTPATIENT)
Dept: INFECTIOUS DISEASES | Facility: CLINIC | Age: 29
End: 2019-02-21
Payer: COMMERCIAL

## 2019-02-21 VITALS
SYSTOLIC BLOOD PRESSURE: 125 MMHG | WEIGHT: 315 LBS | RESPIRATION RATE: 17 BRPM | HEART RATE: 101 BPM | HEIGHT: 71 IN | TEMPERATURE: 97.4 F | DIASTOLIC BLOOD PRESSURE: 80 MMHG | BODY MASS INDEX: 44.1 KG/M2

## 2019-02-21 DIAGNOSIS — B20 HIV DISEASE (HCC): Primary | ICD-10-CM

## 2019-02-21 DIAGNOSIS — R31.29 MICROHEMATURIA: ICD-10-CM

## 2019-02-21 DIAGNOSIS — R50.9 FEVER, UNSPECIFIED FEVER CAUSE: ICD-10-CM

## 2019-02-21 DIAGNOSIS — I10 BENIGN ESSENTIAL HYPERTENSION: ICD-10-CM

## 2019-02-21 DIAGNOSIS — E66.01 MORBID OBESITY (HCC): ICD-10-CM

## 2019-02-21 DIAGNOSIS — L73.9 FOLLICULITIS: ICD-10-CM

## 2019-02-21 PROCEDURE — 99215 OFFICE O/P EST HI 40 MIN: CPT | Performed by: INTERNAL MEDICINE

## 2019-02-21 RX ORDER — CLARITHROMYCIN 500 MG/1
1 TABLET, COATED ORAL 2 TIMES DAILY
Refills: 0 | COMMUNITY
Start: 2019-02-20 | End: 2019-08-15 | Stop reason: ALTCHOICE

## 2019-02-21 NOTE — PROGRESS NOTES
Progress Note - Infectious Disease   Lacy Davenport 29 y o  male MRN: [id number]  Unit/Bed#:  Encounter: [id number]      Impression/Plan:  1   HIV-doing well on Tivicay Descovy with a CD4 count of greater than 1000 and a viral load of 25 copies  Recheck labs in 5 months and follow up in 6 months  Patient may wish to change to [facility] next visit      2   Morbid Obesity-continues to gain weight  Stressed the importance of diet and weight loss      3   Hypertension-asymptomatic at this time  Blood pressure is now relatively normal   Stressed the importance of close follow-up of his blood pressure and close follow-up with his primary care physician to manage this problem       4   Folliculitis-improved but not completely resolved  Had some GI upset with doxycycline  Await Dermatology evaluation     5  Microhematuria-recurrent  The patient will talk to his primary about additional workup and referral to urology  6  Febrile respiratory illness-possibly viral   The patient has completed courses of azithromycin, and is now on Biaxin  His fever has now resolved  Explained to the patient that this is likely all viral and that antibiotics are probably not indicated  I asked the patient to seek medical care if his fever would recur  If another fever he probably needs a chest x-ray and blood cultures        Patient was provided medication, adherence and prevention education    Subjective:  Routine follow-up for HIV  Patient claims 100% adherence with Tivicay and Descovy    Patient denies any notable side effects  Overall the feeling well  Patient has been struggling with a recent febrile illness  A few weeks ago he developed fever sore throat and body aches, and was treated with azithromycin with resolution  He was feeling better for appeared of time and then in more recent days developed a cough illness with some wheezing and low-grade fever    He initially was started on Ceftin but transition to Biaxin  ROS: A complete 12 point ROS is negative other than that noted in the HPI    Followup portions patient history reviewed and updated as: Allergies, current medications, past medical history, past social history, past surgical history, and the problem list    Objective:  Vitals:  Vitals:    02/21/19 1603   BP: 125/80   Pulse: 101   Resp: 17   Temp: (!) 97 4 °F (36 3 °C)   Weight: (!) 144 kg (317 lb)   Height: 5' 11" (1 803 m)       Physical Exam:   General Appearance:  Alert, interactive, appearing well,  nontoxic, no acute distress  Neck:   Supple without lymphadenopathy, no thyromegaly or masses   Throat: Oropharynx moist without lesions  Lungs:   Clear to auscultation bilaterally; no wheezes, rhonchi or rales; respirations unlabored   Heart:  RRR; no murmur, rub or gallop   Abdomen:   Soft, non-tender, non-distended, positive bowel sounds  Extremities: No clubbing, cyanosis or edema   Skin: No new rashes or lesions  No draining wounds noted         Labs, Imaging, & Other studies:   All pertinent labs and imaging studies were personally reviewed    Creatinine 1 01, liver function tests normal, glucose 132, urinalysis with 3-5 RBCs, CD4 644, WBC count 12 9, HIV RNA 25 copies, QuantiFERON gold negative, GC and Chlamydia negative,    Current Outpatient Medications:     albuterol (PROVENTIL HFA,VENTOLIN HFA) 90 mcg/act inhaler, Inhale 1-2 puffs, Disp: , Rfl:     clonazePAM (KlonoPIN) 0 5 mg tablet, Take 1-2 tablets by mouth daily at bedtime 1-2 tabs every night , Disp: , Rfl:     dolutegravir (TIVICAY) 50 MG TABS, Take 1 tablet (50 mg total) by mouth daily for 180 days, Disp: 30 tablet, Rfl: 5    emtricitabine-tenofovir AF (DESCOVY) 200-25 MG tablet, Take 1 tablet by mouth daily for 180 days, Disp: 30 tablet, Rfl: 5    escitalopram (LEXAPRO) 10 mg tablet, Take 1 tablet by mouth daily, Disp: , Rfl:     levocetirizine (XYZAL) 5 MG tablet, Take 1 tablet by mouth daily, Disp: , Rfl:    lisdexamfetamine (VYVANSE) 50 MG capsule, Take 1 capsule by mouth Daily, Disp: , Rfl:     montelukast (SINGULAIR) 10 mg tablet, Take 1 tablet by mouth daily, Disp: , Rfl:     risperiDONE (RisperDAL) 0 25 mg tablet, 1mg oral at bedtime, Disp: , Rfl: 0    atorvastatin (LIPITOR) 20 mg tablet, TK 1 T PO D, Disp: , Rfl: 1    Cholecalciferol (VITAMIN D HIGH POTENCY PO), Take 14,000 Units by mouth once a week, Disp: , Rfl:     clarithromycin (BIAXIN) 500 mg tablet, Take 1 tablet by mouth 2 (two) times a day, Disp: , Rfl: 0

## 2019-02-21 NOTE — PATIENT INSTRUCTIONS
Switch to [facility] when it is available  Go for lab work 4 weeks after starting [facility]  Return to office in 6 weeks  If there is a delay in getting and starting the Biktarvy, please call our office so that we can adjust the timing of your next appt  If current symptoms worsen or do not improve, contact PCP or call our office for guidance     We hope you feel better!!!!

## 2019-04-11 ENCOUNTER — OFFICE VISIT (OUTPATIENT)
Dept: INFECTIOUS DISEASES | Facility: CLINIC | Age: 29
End: 2019-04-11
Payer: COMMERCIAL

## 2019-04-11 VITALS
BODY MASS INDEX: 45.1 KG/M2 | HEART RATE: 82 BPM | RESPIRATION RATE: 17 BRPM | HEIGHT: 70 IN | WEIGHT: 315 LBS | DIASTOLIC BLOOD PRESSURE: 78 MMHG | TEMPERATURE: 97 F | SYSTOLIC BLOOD PRESSURE: 148 MMHG

## 2019-04-11 DIAGNOSIS — I10 BENIGN ESSENTIAL HYPERTENSION: ICD-10-CM

## 2019-04-11 DIAGNOSIS — R94.5 LIVER FUNCTION ABNORMALITY: ICD-10-CM

## 2019-04-11 DIAGNOSIS — E66.01 MORBID OBESITY (HCC): ICD-10-CM

## 2019-04-11 DIAGNOSIS — R31.29 MICROHEMATURIA: ICD-10-CM

## 2019-04-11 DIAGNOSIS — B20 HIV DISEASE (HCC): Primary | ICD-10-CM

## 2019-04-11 DIAGNOSIS — L73.9 FOLLICULITIS: ICD-10-CM

## 2019-04-11 PROCEDURE — 99215 OFFICE O/P EST HI 40 MIN: CPT | Performed by: INTERNAL MEDICINE

## 2019-07-09 ENCOUNTER — TELEPHONE (OUTPATIENT)
Dept: INFECTIOUS DISEASES | Facility: CLINIC | Age: 29
End: 2019-07-09

## 2019-07-29 DIAGNOSIS — B20 HIV DISEASE (HCC): ICD-10-CM

## 2019-08-15 ENCOUNTER — OFFICE VISIT (OUTPATIENT)
Dept: INFECTIOUS DISEASES | Facility: CLINIC | Age: 29
End: 2019-08-15
Payer: COMMERCIAL

## 2019-08-15 VITALS
DIASTOLIC BLOOD PRESSURE: 78 MMHG | WEIGHT: 315 LBS | TEMPERATURE: 97.3 F | HEART RATE: 80 BPM | HEIGHT: 70 IN | RESPIRATION RATE: 18 BRPM | BODY MASS INDEX: 45.1 KG/M2 | SYSTOLIC BLOOD PRESSURE: 158 MMHG

## 2019-08-15 DIAGNOSIS — I10 BENIGN ESSENTIAL HYPERTENSION: ICD-10-CM

## 2019-08-15 DIAGNOSIS — Z11.3 ENCOUNTER FOR SCREENING FOR INFECTIONS WITH A PREDOMINANTLY SEXUAL MODE OF TRANSMISSION: ICD-10-CM

## 2019-08-15 DIAGNOSIS — R31.29 MICROHEMATURIA: ICD-10-CM

## 2019-08-15 DIAGNOSIS — F17.210 CIGARETTE NICOTINE DEPENDENCE WITHOUT COMPLICATION: ICD-10-CM

## 2019-08-15 DIAGNOSIS — B20 HIV DISEASE (HCC): Primary | ICD-10-CM

## 2019-08-15 DIAGNOSIS — E66.01 MORBID OBESITY (HCC): ICD-10-CM

## 2019-08-15 DIAGNOSIS — L73.9 FOLLICULITIS: ICD-10-CM

## 2019-08-15 DIAGNOSIS — Z72.89 OTHER PROBLEMS RELATED TO LIFESTYLE: ICD-10-CM

## 2019-08-15 DIAGNOSIS — D72.89 OTHER SPECIFIED DISORDERS OF WHITE BLOOD CELLS: ICD-10-CM

## 2019-08-15 DIAGNOSIS — R94.5 LIVER FUNCTION ABNORMALITY: ICD-10-CM

## 2019-08-15 PROCEDURE — 90471 IMMUNIZATION ADMIN: CPT

## 2019-08-15 PROCEDURE — 99215 OFFICE O/P EST HI 40 MIN: CPT | Performed by: INTERNAL MEDICINE

## 2019-08-15 PROCEDURE — 90734 MENACWYD/MENACWYCRM VACC IM: CPT

## 2019-08-15 NOTE — PROGRESS NOTES
Progress Note - Infectious Disease   Dea Anderson 34 y o  male MRN: [id number]  Unit/Bed#:  Encounter: [id number]      Impression/Plan:  1   HIV-doing well on Biktarvy with a CD4 count of greater than 1000 and a viral load of 35 copies  Recheck labs in 2 months and follow up in 3 months  Begin Menactra series      2   Morbid Obesity-continues to gain weight  Stressed the importance of diet and weight loss      3   Hypertension-asymptomatic at this time    Blood pressure is elevated  Stressed the importance of close follow-up of his blood pressure and close follow-up with his primary care physician to manage this problem       4   Folliculitis-improved but not completely resolved   Still await Dermatology evaluation      5   Microhematuria-recurrent   The patient will talk to his primary about additional workup and referral to urology      6  Liver function test abnormalities-mild  Suspect steatohepatitis  They have now normalized     Stressed the importance of diet and exercise  7  Nicotine dependence-stressed the importance of complete tobacco cessation  I have offered to transfer the patient over the clinic as it offers more services for primary care, smoking cessation, and nutritional counseling  The patient will consider this  Patient was provided medication, adherence and prevention education    Subjective:  Routine follow-up for HIV  Patient claims 100% adherence with  Biktarvy  Patient denies any notable side effects  Overall the feeling well  The patient denies any fever chills or sweats, denies any nausea vomiting or diarrhea, denies any cough or shortness of breath  ROS: A complete 12 point ROS is negative other than that noted in the HPI    Followup portions patient history reviewed and updated as:   Allergies, current medications, past medical history, past social history, past surgical history, and the problem list    Objective:  Vitals:  Vitals:    08/15/19 0818   BP: 158/78 Pulse: 80   Resp: 18   Temp: (!) 97 3 °F (36 3 °C)   Weight: (!) 150 kg (330 lb)   Height: 5' 10" (1 778 m)       Physical Exam:   General Appearance:  Alert, interactive, appearing well,  nontoxic, no acute distress  Neck:   Supple without lymphadenopathy, no thyromegaly or masses   Throat: Oropharynx moist without lesions  Lungs:   Clear to auscultation bilaterally; no wheezes, rhonchi or rales; respirations unlabored   Heart:  RRR; no murmur, rub or gallop   Abdomen:   Soft, non-tender, non-distended, positive bowel sounds  Extremities: No clubbing, cyanosis or edema   Skin: No new rashes or lesions  No draining wounds noted         Labs, Imaging, & Other studies:   All pertinent labs and imaging studies were personally reviewed    White cell count 9 7, platelets 926, HIV RNA 35 copies, creatinine 1 1, liver function tests normal, CD4 1374      Current Outpatient Medications:     albuterol (PROVENTIL HFA,VENTOLIN HFA) 90 mcg/act inhaler, Inhale 1-2 puffs, Disp: , Rfl:     atorvastatin (LIPITOR) 20 mg tablet, TK 1 T PO D, Disp: , Rfl: 1    bictegravir-emtricitab-tenofovir alafenamide (BIKTARVY) -25 MG tablet, Take 1 tablet by mouth daily with breakfast for 180 days, Disp: 30 tablet, Rfl: 0    Cholecalciferol (VITAMIN D HIGH POTENCY PO), Take 14,000 Units by mouth once a week, Disp: , Rfl:     clarithromycin (BIAXIN) 500 mg tablet, Take 1 tablet by mouth 2 (two) times a day, Disp: , Rfl: 0    clonazePAM (KlonoPIN) 0 5 mg tablet, Take 1-2 tablets by mouth daily at bedtime 1-2 tabs every night , Disp: , Rfl:     escitalopram (LEXAPRO) 10 mg tablet, Take 1 tablet by mouth daily, Disp: , Rfl:     levocetirizine (XYZAL) 5 MG tablet, Take 1 tablet by mouth daily, Disp: , Rfl:     lisdexamfetamine (VYVANSE) 50 MG capsule, Take 1 capsule by mouth Daily, Disp: , Rfl:     montelukast (SINGULAIR) 10 mg tablet, Take 1 tablet by mouth daily, Disp: , Rfl:     risperiDONE (RisperDAL) 0 25 mg tablet, 1mg oral at bedtime, Disp: , Rfl: 0

## 2019-09-09 DIAGNOSIS — B20 HIV DISEASE (HCC): ICD-10-CM

## 2019-10-02 DIAGNOSIS — B20 HIV DISEASE (HCC): ICD-10-CM

## 2019-11-06 ENCOUNTER — TELEPHONE (OUTPATIENT)
Dept: INFECTIOUS DISEASES | Facility: CLINIC | Age: 29
End: 2019-11-06

## 2019-11-06 NOTE — TELEPHONE ENCOUNTER
Contacted pt to confirm he was getting his labs done  He will have them done by the end of the week

## 2019-11-11 LAB
EXTERNAL CHLAMYDIA RESULT: NOT DETECTED
N GONORRHOEA RRNA SPEC QL PROBE: NOT DETECTED

## 2019-11-14 ENCOUNTER — OFFICE VISIT (OUTPATIENT)
Dept: INFECTIOUS DISEASES | Facility: CLINIC | Age: 29
End: 2019-11-14
Payer: COMMERCIAL

## 2019-11-14 VITALS — TEMPERATURE: 97.7 F | HEART RATE: 83 BPM | DIASTOLIC BLOOD PRESSURE: 76 MMHG | SYSTOLIC BLOOD PRESSURE: 124 MMHG

## 2019-11-14 DIAGNOSIS — R94.5 LIVER FUNCTION ABNORMALITY: ICD-10-CM

## 2019-11-14 DIAGNOSIS — F17.210 CIGARETTE NICOTINE DEPENDENCE WITHOUT COMPLICATION: ICD-10-CM

## 2019-11-14 DIAGNOSIS — B20 HIV DISEASE (HCC): Primary | ICD-10-CM

## 2019-11-14 DIAGNOSIS — I10 BENIGN ESSENTIAL HYPERTENSION: ICD-10-CM

## 2019-11-14 DIAGNOSIS — E66.01 MORBID OBESITY (HCC): ICD-10-CM

## 2019-11-14 DIAGNOSIS — Z23 NEED FOR INFLUENZA VACCINATION: ICD-10-CM

## 2019-11-14 DIAGNOSIS — R73.03 PREDIABETES: ICD-10-CM

## 2019-11-14 DIAGNOSIS — L73.9 FOLLICULITIS: ICD-10-CM

## 2019-11-14 DIAGNOSIS — R74.01 TRANSAMINITIS: ICD-10-CM

## 2019-11-14 DIAGNOSIS — R31.29 MICROHEMATURIA: ICD-10-CM

## 2019-11-14 PROCEDURE — 90682 RIV4 VACC RECOMBINANT DNA IM: CPT | Performed by: INTERNAL MEDICINE

## 2019-11-14 PROCEDURE — 99214 OFFICE O/P EST MOD 30 MIN: CPT | Performed by: INTERNAL MEDICINE

## 2019-11-14 PROCEDURE — 90471 IMMUNIZATION ADMIN: CPT | Performed by: INTERNAL MEDICINE

## 2019-11-14 RX ORDER — ZIPRASIDONE HYDROCHLORIDE 20 MG/1
20 CAPSULE ORAL DAILY
COMMUNITY
End: 2020-03-12 | Stop reason: ALTCHOICE

## 2019-11-14 NOTE — PROGRESS NOTES
Progress Note - Infectious Disease   Branden Chung 34 y o  male MRN: [id number]  Unit/Bed#:  Encounter: [id number]      Impression/Plan:  1   HIV-doing well on Biktarvy with a CD4 count of greater than 1000 and a viral load of 50 copies  Recheck labs in 2 months and follow up in 3 months  patient will get flu vaccine today     2   Morbid Obesity-continues to gain weight  Stressed the importance of diet and weight loss      3   Hypertension-asymptomatic at this time   Blood pressure improved  Follow-up with the primary      4   Folliculitis-improved but not completely resolved   Still await Dermatology evaluation      5   Microhematuria-recurrent   The patient will talk to his primary about additional workup and referral to urology  Recheck UA and if still positive the patient will go to Urology     6  Liver function test abnormalities-mild   Suspect steatohepatitis  Still with slight elevation in the ALT   Stressed the importance of diet and exercise  Check right upper quadrant ultrasound     7  Nicotine dependence-stressed the importance of complete tobacco cessation  8  Pre diabetes-patient to see endocrinology  Stressed the importance of diet and exercise and weight loss  No problems with interaction with Biktarvy and metformin      Patient was provided medication, adherence and prevention education    Subjective:  Routine follow-up for HIV  Patient claims 100% adherence with     Patient denies any notable side effects  Overall the feeling well  The patient denies any fever chills or sweats, denies any nausea vomiting or diarrhea, denies any cough or shortness of breath  ROS: A complete 12 point ROS is negative other than that noted in the HPI    Followup portions patient history reviewed and updated as:   Allergies, current medications, past medical history, past social history, past surgical history, and the problem list    Objective:  Vitals:  Vitals:    11/14/19 0829   BP: 124/76   Pulse: 83 Temp: 97 7 °F (36 5 °C)       Physical Exam:   General Appearance:  Alert, interactive, appearing well,  nontoxic, no acute distress  Neck:   Supple without lymphadenopathy, no thyromegaly or masses   Throat: Oropharynx moist without lesions  Lungs:   Clear to auscultation bilaterally; no wheezes, rhonchi or rales; respirations unlabored   Heart:  RRR; no murmur, rub or gallop   Abdomen:   Soft, non-tender, non-distended, positive bowel sounds  Extremities: No clubbing, cyanosis or edema   Skin: No new rashes or lesions  No draining wounds noted         Labs, Imaging, & Other studies:   All pertinent labs and imaging studies were personally reviewed    Creatinine 1 01, ALT 58, the remainder liver function tests are normal, CD4 1004 in 25, QuantiFERON gold negative, HIV RNA 50 copies, GC and Chlamydia not detected      Current Outpatient Medications:     albuterol (PROVENTIL HFA,VENTOLIN HFA) 90 mcg/act inhaler, Inhale 1-2 puffs, Disp: , Rfl:     bictegravir-emtricitab-tenofovir alafenamide (BIKTARVY) -25 MG tablet, Take 1 tablet by mouth daily with breakfast for 180 days, Disp: 30 tablet, Rfl: 0    escitalopram (LEXAPRO) 10 mg tablet, Take 1 tablet by mouth daily, Disp: , Rfl:     levocetirizine (XYZAL) 5 MG tablet, Take 1 tablet by mouth daily, Disp: , Rfl:     lisdexamfetamine (VYVANSE) 50 MG capsule, Take 1 capsule by mouth Daily, Disp: , Rfl:     montelukast (SINGULAIR) 10 mg tablet, Take 1 tablet by mouth daily, Disp: , Rfl:     risperiDONE (RisperDAL) 0 25 mg tablet, 1mg oral at bedtime, Disp: , Rfl: 0

## 2019-11-22 ENCOUNTER — OFFICE VISIT (OUTPATIENT)
Dept: ENDOCRINOLOGY | Facility: CLINIC | Age: 29
End: 2019-11-22
Payer: COMMERCIAL

## 2019-11-22 VITALS
BODY MASS INDEX: 45.1 KG/M2 | WEIGHT: 315 LBS | HEIGHT: 70 IN | HEART RATE: 74 BPM | SYSTOLIC BLOOD PRESSURE: 160 MMHG | DIASTOLIC BLOOD PRESSURE: 100 MMHG

## 2019-11-22 DIAGNOSIS — I10 BENIGN ESSENTIAL HYPERTENSION: ICD-10-CM

## 2019-11-22 DIAGNOSIS — E55.9 VITAMIN D DEFICIENCY: ICD-10-CM

## 2019-11-22 DIAGNOSIS — R73.03 PREDIABETES: Primary | ICD-10-CM

## 2019-11-22 DIAGNOSIS — E66.01 MORBID OBESITY (HCC): ICD-10-CM

## 2019-11-22 DIAGNOSIS — R94.5 LIVER FUNCTION ABNORMALITY: ICD-10-CM

## 2019-11-22 PROCEDURE — 99244 OFF/OP CNSLTJ NEW/EST MOD 40: CPT | Performed by: INTERNAL MEDICINE

## 2019-11-22 NOTE — PROGRESS NOTES
Assessment/Plan:    1  Pre Diabetes  BG in 130s on 1 occasion only   Previously approx 120 therefore diagnosis of DM must be established on two separate occassions from a BMP (not fingerstick glucose)  Counseled pt on exercise, diet, and lifestyle changes as weight loss even a small amount will make the most difference at this point  Referral to nutritionist given  Information given on Saxenda (liraglutide) for weight loss agent   RTC in 8 weeks after checking CMP, A1c, lipid panel, microalb/cr ratio, thyroid studies and vitamin D    2  Elevated BP   160/100 in office today  No established dx of HTN though given metabolic syndrome would not be surprised if this were the case  Cont to monitor at home, follow up with PCP         Subjective:      Patient ID: Anil Rushing is a 34 y o  male  33 yo M here for pre-DM  Dx about 1 year ago  Used to check BG at home fasting on occasion, and would range from 120-140,150  Diet is poor with takeout food and carb heavy  Has some polydipsia,   Gained weight recently about 30lbs  Normal appetite  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, works in an office  No over exercise  Does see podiatry for his left foot pain, possible gout  No neuropathy  No visual changes  Fhx includes father - diabetes and CAD sp CABG, grandmother-diabetes, siblings with pre-DM  NO sudden death  Socially he is previous smoker of 12 years but quit in August (at most was 1/2ppd), drinks alcohol in moderation  The following portions of the patient's history were reviewed and updated as appropriate: allergies, current medications, past family history, past medical history, past social history, past surgical history and problem list     Review of Systems   Constitutional: Negative for fatigue, fever and unexpected weight change  HENT: Negative for postnasal drip and rhinorrhea  Respiratory: Negative for cough and shortness of breath  Cardiovascular: Negative for chest pain  Neurological: Negative for weakness and numbness  All other systems reviewed and are negative  Objective:      /100   Pulse 74   Ht 5' 10" (1 778 m)   Wt (!) 153 kg (336 lb 12 8 oz)   BMI 48 33 kg/m²       Physical Exam:   Vital signs reviewed  General: obese male,  no acute distress  Head: normocephalic  Eyes: normal conjunctivae   Neck: supple  Lungs: CTAB, no labored breathing/accessory muscle use  Heart: regular rate and rhythm, without murmur  Abdomen: no distension, soft nontender  Extremities: no cyanosis  Neuro: grossly intact with no obvious focal deficits  Skin: warm, dry     Patient's shoes and socks removed  Right Foot/Ankle   Right Foot Inspection  Skin Exam: skin normal and skin intact no dry skin, no warmth, no callus, no erythema, no maceration, no abnormal color, no pre-ulcer, no ulcer and no callus                            Sensory     Proprioception: intact   Monofilament testing: intact  Vascular  Capillary refills: < 3 seconds  The right DP pulse is 2+  The right PT pulse is 1+  Left Foot/Ankle  Left Foot Inspection  Skin Exam: skin normal and skin intactno dry skin, no warmth, no erythema, no maceration, normal color, no pre-ulcer, no ulcer and no callus                                         Sensory     Proprioception: intact  Monofilament: intact  Vascular  Capillary refills: < 3 seconds  The left DP pulse is 2+  The left PT pulse is 1+  Assign Risk Category:  No deformity present; No loss of protective sensation; No weak pulses       Risk: 0      Laboratory studies reviewed from prior, no recent labs available in the past 3-4 months  ALMA Kirkpatrick Ra           Chief Resident, PGY-3  Internal Medicine     11/22/2019 9:02 AM

## 2019-11-22 NOTE — PATIENT INSTRUCTIONS
Start VITAMIN D3 5000 IU DAILY AFTER DISCUSSING WITH Dr Joellen Doyle         Liraglutide (By injection)   Liraglutide (iht-l-LJWW-tide)  Treats type 2 diabetes  Also used to help with weight loss in certain patients  Brand Name(s): Saxenda, Victoza   There may be other brand names for this medicine  When This Medicine Should Not Be Used: This medicine is not right for everyone  Do not use it if you had an allergic reaction to liraglutide, you have a multiple endocrine neoplasia syndrome type 2 (MEN 2), or if you or anyone in your family had medullary thyroid cancer  Tell your doctor if you are pregnant or become pregnant while you are using this medicine  How to Use This Medicine:   Injectable  · Your doctor will prescribe your exact dose and tell you how often it should be given  This medicine is given as a shot under the skin of your stomach, thighs, or upper arms  · If you use insulin in addition to this medicine, do not mix them into the same syringe  You may give the shots in the same area (such as your stomach), but do not give the shots right next to each other  · You may be taught how to give your medicine at home  Make sure you understand all instructions before giving yourself an injection  Do not use more medicine or use it more often than your doctor tells you to  · You will be shown the body areas where this shot can be given  Use a different body area each time you give yourself a shot  Keep track of where you give each shot to make sure you rotate body areas  · Use a new needle and syringe each time you inject your medicine  · Never share medicine pens with others under any circumstances  Sharing needles or pens can result in transmission of infection  · Drink extra fluids so you will urinate more often and help prevent kidney problems  · This medicine should come with a Medication Guide  Ask your pharmacist for a copy if you do not have one  · Missed dose:  If you miss a dose of this medicine, use it as soon as you remember  Then take your next daily dose as usual on the following day  Never take extra medicine to make up for a missed dose  If you miss a dose for 3 days or more, call your doctor to talk about how to restart your treatment  · Store your new, unused medicine pen in the refrigerator, in the original carton, and protect it from light  Do not freeze this medicine, and do not use the medicine if it has been frozen  You may store the opened medicine pen in the refrigerator or at room temperature for 30 days  Throw away your used pen after 30 days, even if it still has medicine in it  Remove the needle from the pen before you store it  · Throw away used needles in a hard, closed container that the needles cannot poke through  Keep this container away from children and pets  Drugs and Foods to Avoid:      Ask your doctor or pharmacist before using any other medicine, including over-the-counter medicines, vitamins, and herbal products  Warnings While Using This Medicine:   · Tell your doctor if you are breastfeeding, or if you have kidney disease, liver disease, digestion problems (such as gastroparesis), gallbladder disease, or a history of pancreas problems, depression, or angioedema (swelling of the arms, face, hands, mouth, or throat)  · Do not use Saxenda® if you are also using Victoza®  They contain the same medicine  · This medicine may cause the following problems:   ¨ An increased risk of thyroid tumor  ¨ Pancreatitis  ¨ Low blood sugar  ¨ Gallbladder problems, including gallstones (Saxenda®)  ¨ Thoughts of hurting yourself Carina Erps)  · Your doctor will do lab tests at regular visits to check on the effects of this medicine  Keep all appointments  · Keep all medicine out of the reach of children  Never share your medicine with anyone    Possible Side Effects While Using This Medicine:   Call your doctor right away if you notice any of these side effects:  · Allergic reaction: Itching or hives, swelling in your face or hands, swelling or tingling in your mouth or throat, chest tightness, trouble breathing  · Change in how much or how often you urinate, or painful or burning urination  · Fast or racing heartbeat  · Feeling sad or depressed, thoughts of suicide, unusual changes in mood or behavior  · Shaking, trembling, sweating, fast or pounding heartbeat, fainting, hunger, confusion  · Sudden and severe stomach pain, nausea, vomiting, fever, and lightheadedness  · Trouble breathing or swallowing, a lump in your neck, hoarseness when speaking  · Yellow skin or eyes  If you notice these less serious side effects, talk with your doctor:   · Decreased appetite  · Dizziness or headache  · Nausea, diarrhea, constipation, or upset stomach  · Redness, itching, swelling, or any changes in your skin where the shot was given  If you notice other side effects that you think are caused by this medicine, tell your doctor  Call your doctor for medical advice about side effects  You may report side effects to FDA at 1-209-FDA-4395  © 2017 [employer] Information is for End User's use only and may not be sold, redistributed or otherwise used for commercial purposes  The above information is an  only  It is not intended as medical advice for individual conditions or treatments  Talk to your doctor, nurse or pharmacist before following any medical regimen to see if it is safe and effective for you

## 2019-11-22 NOTE — LETTER
November 22, 2019     MD [address] , [place]  [address]    Patient: Ike Webster   YOB: 1990   Date of Visit: 11/22/2019       Dear Dr Maria Fernanda Fernandes: Thank you for referring Damarijohn paul Seaman to me for evaluation  Below are my notes for this consultation  If you have questions, please do not hesitate to call me  I look forward to following your patient along with you  Sincerely,        Stephen Du MD        CC: MD Radha Leggett,   11/22/2019 12:34 PM  Attested  Assessment/Plan:    1  Pre Diabetes  BG in 130s on 1 occasion only   Previously approx 120 therefore diagnosis of DM must be established on two separate occassions from a BMP (not fingerstick glucose)  Counseled pt on exercise, diet, and lifestyle changes as weight loss even a small amount will make the most difference at this point  Referral to nutritionist given  Information given on Saxenda (liraglutide) for weight loss agent   RTC in 8 weeks after checking CMP, A1c, lipid panel, microalb/cr ratio, thyroid studies and vitamin D    2  Elevated BP   160/100 in office today  No established dx of HTN though given metabolic syndrome would not be surprised if this were the case  Cont to monitor at home, follow up with PCP         Subjective:      Patient ID: Ike Webster is a 34 y o  male  35 yo M here for pre-DM  Dx about 1 year ago  Used to check BG at home fasting on occasion, and would range from 120-140,150  Diet is poor with takeout food and carb heavy  Has some polydipsia,   Gained weight recently about 30lbs  Normal appetite  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, works in an office  No over exercise  Does see podiatry for his left foot pain, possible gout  No neuropathy  No visual changes  Fhx includes father - diabetes and CAD sp CABG, grandmother-diabetes, siblings with pre-DM  NO sudden death       Socially he is previous smoker of 12 years but quit in August (at most was 1/2ppd), drinks alcohol in moderation  The following portions of the patient's history were reviewed and updated as appropriate: allergies, current medications, past family history, past medical history, past social history, past surgical history and problem list     Review of Systems   Constitutional: Negative for fatigue, fever and unexpected weight change  HENT: Negative for postnasal drip and rhinorrhea  Respiratory: Negative for cough and shortness of breath  Cardiovascular: Negative for chest pain  Neurological: Negative for weakness and numbness  All other systems reviewed and are negative  Objective:      /100   Pulse 74   Ht 5' 10" (1 778 m)   Wt (!) 153 kg (336 lb 12 8 oz)   BMI 48 33 kg/m²        Physical Exam:   Vital signs reviewed  General: obese male,  no acute distress  Head: normocephalic  Eyes: normal conjunctivae   Neck: supple  Lungs: CTAB, no labored breathing/accessory muscle use  Heart: regular rate and rhythm, without murmur  Abdomen: no distension, soft nontender  Extremities: no cyanosis  Neuro: grossly intact with no obvious focal deficits  Skin: warm, dry     Patient's shoes and socks removed  Right Foot/Ankle   Right Foot Inspection  Skin Exam: skin normal and skin intact no dry skin, no warmth, no callus, no erythema, no maceration, no abnormal color, no pre-ulcer, no ulcer and no callus                            Sensory     Proprioception: intact   Monofilament testing: intact  Vascular  Capillary refills: < 3 seconds  The right DP pulse is 2+  The right PT pulse is 1+  Left Foot/Ankle  Left Foot Inspection  Skin Exam: skin normal and skin intactno dry skin, no warmth, no erythema, no maceration, normal color, no pre-ulcer, no ulcer and no callus                                         Sensory     Proprioception: intact  Monofilament: intact  Vascular  Capillary refills: < 3 seconds  The left DP pulse is 2+   The left PT pulse is 1+    Assign Risk Category:  No deformity present; No loss of protective sensation; No weak pulses       Risk: 0      Laboratory studies reviewed from prior, no recent labs available in the past 3-4 months  ALMA Nath  Chief Resident, PGY-3  Internal Medicine     11/22/2019 9:02 AM      Attestation signed by Rometta Harada, MD at 11/22/2019 12:34 PM:  I reviewed the care furnished by the Resident during the visit  I was present in the office and personally saw and examined the patient And agree with the plan except as noted  Patient with Metabolic syndrome/prediabetes hyperlipidemia/hypertension- counseled metabolic complications obesity  Referred For medical nutrition therapy  he had been on statin fibrate in the however could tolerate secondary to myalgias- will repeat fasting  lipid profile after dietary modifications  He has history of vitamin-D deficiency however currently not on supplementations  There was some concern supplement referring with his HIV medications  I have advised him to check with Dr Leyla Turpin and if it is okay he can start D3 5000 iu daily      Rometta Harada, MD 11/22/19

## 2019-11-25 ENCOUNTER — TELEPHONE (OUTPATIENT)
Dept: ENDOCRINOLOGY | Facility: CLINIC | Age: 29
End: 2019-11-25

## 2019-11-25 NOTE — TELEPHONE ENCOUNTER
Reschedule with Aaron Hernández about a month or so after seeing the nutritionist and have labs done prior to visit

## 2019-11-25 NOTE — TELEPHONE ENCOUNTER
Left message for patient asking him to call me back to r/s his appt on 2/24 with Rahel Maki for about 1 month after his visit with nutrition

## 2020-01-15 DIAGNOSIS — B20 HIV DISEASE (HCC): ICD-10-CM

## 2020-02-03 ENCOUNTER — CLINICAL SUPPORT (OUTPATIENT)
Dept: NUTRITION | Facility: HOSPITAL | Age: 30
End: 2020-02-03
Attending: INTERNAL MEDICINE
Payer: COMMERCIAL

## 2020-02-03 VITALS — WEIGHT: 315 LBS | HEIGHT: 70 IN | BODY MASS INDEX: 45.1 KG/M2

## 2020-02-03 DIAGNOSIS — R73.03 PREDIABETES: ICD-10-CM

## 2020-02-03 DIAGNOSIS — E66.01 MORBID OBESITY (HCC): ICD-10-CM

## 2020-02-03 DIAGNOSIS — R94.5 LIVER FUNCTION ABNORMALITY: ICD-10-CM

## 2020-02-03 DIAGNOSIS — I10 BENIGN ESSENTIAL HYPERTENSION: ICD-10-CM

## 2020-02-03 PROCEDURE — 97802 MEDICAL NUTRITION INDIV IN: CPT

## 2020-02-03 NOTE — PATIENT INSTRUCTIONS
1  Achieve 5% (307lb)weight loss in 6 weeks(March 16) and 10% weight loss in 12 weeks (291lb)  2  Food journal with myfitnesspal or other tool  3  Consume 3 serv fruit fresh/frozen  4  Consume 4-6 Cups or more veggies daily    5  4948-6527 kcal, 85 gram healthy fat, 90 gram Protein  6  Read food labels for 7 gram Protein and 3gram Fiber  7  Contact RD with any questions/concerns

## 2020-02-03 NOTE — PROGRESS NOTES
Initial Nutrition Assessment Form    Patient Name: Torrey Lubin    YOB: 1990    Sex: Male     Assessment Date: 2/3/2020  Start Time: 1:30pm Stop Time: 2:30pm Total Minutes: 60min     Data:  Present at session: Self and  Nicole   Parent/Patient Concerns: "My weight has been an issue all my life  I am trying to go from being Pre Diabetic to having my numbers in line and have a healthier life style  I have been eating a Keto Diet the past few weeks"   Medical Dx/Reason for Referral: R73 03 Pre Diabetes, E66 01 Obesity, I10 Benign Hypertension, R94 5 Liver Function Abnormality   Past Medical History:   Diagnosis Date    Abnormal liver function tests     ADD (attention deficit disorder)     Asthma     Benign essential hypertension     Depression     Encounter for screening examination for sexually transmitted disease     [facility] [address] [facility]     Hypercholesteremia     Microhematuria     [facility]     Seasonal allergies        Current Outpatient Medications   Medication Sig Dispense Refill    albuterol (PROVENTIL HFA,VENTOLIN HFA) 90 mcg/act inhaler Inhale 1-2 puffs      bictegravir-emtricitab-tenofovir alafenamide (BIKTARVY) -25 MG tablet Take 1 tablet by mouth daily with breakfast 90 tablet 1    escitalopram (LEXAPRO) 10 mg tablet Take 1 tablet by mouth daily      levocetirizine (XYZAL) 5 MG tablet Take 1 tablet by mouth daily      lisdexamfetamine (VYVANSE) 50 MG capsule Take 1 capsule by mouth Daily      montelukast (SINGULAIR) 10 mg tablet Take 1 tablet by mouth daily      ziprasidone (GEODON) 20 mg capsule Take 20 mg by mouth daily       No current facility-administered medications for this visit           Additional Meds/Supplements: Replesta Vitamin D3 50,000IU week x 8 weeks and then 18,000IU, Magnesium   Special Learning Needs: None   Height: HC Readings from Last 3 Encounters:   No data found for HC   Height Measured 5ft 10in   Weight: Wt Readings from Last 10 Encounters:   11/22/19 (!) 153 kg (336 lb 12 8 oz)   08/15/19 (!) 150 kg (330 lb)   04/11/19 (!) 147 kg (324 lb)   02/21/19 (!) 144 kg (317 lb)   09/13/18 (!) 142 kg (312 lb 6 4 oz)   05/17/18 (!) 137 kg (301 lb)   02/01/18 130 kg (287 lb 6 4 oz)   10/05/17 126 kg (277 lb)   06/15/17 133 kg (293 lb 12 8 oz)   05/04/17 134 kg (295 lb 3 2 oz)     Estimated body mass index is 48 33 kg/m² as calculated from the following:    Height as of 11/22/19: 5' 10" (1 778 m)  Weight as of 11/22/19: 153 kg (336 lb 12 8 oz)  Recent Weight Change: [x]Yes     []No  Amount: Highest weight 12/19 336lb      Energy Needs: [place]  [place] Equation:  0004 x1 34 =3359kcal -1708=6849axmg   No Known Allergies    Social History     Substance and Sexual Activity   Alcohol Use Yes    Comment: 1 drink per month       Social History     Tobacco Use   Smoking Status Current Every Day Smoker    Types: E-Cigarettes   Smokeless Tobacco Current User       Who shops? patient   Who cooks? patient   Exercise: Nothing Structured at this review   Prior Counseling?  [x]Yes     []No  When: multiple times in past, attended weight loss camp     Why: Obesity        Diet Hx:  Breakfast: Coffee with Splenda/Cream  3 Jumbo Eggs Fried with avocado oil/cheese/avaocado 1/2  Water   Lunch: Spinach Salad with cucumbers/domingo with Egg Salad or veggies  Sesame Oil/Vinegar  Water     Dinner: Pasta with veggies and meat  Water  No sugar soda     Snacks:  1/4 cup Pecans/Almonds, Smoothie        Nutrition Diagnosis:   Overweight/obesity  related to Excess energy intake as evidenced by  BMI more than normative standard for age and sex (obesity-grade III 40+)       Medical Nutrition Therapy Intervention:  [x]Individualized Meal Plan-2000-2200 kcal, 90 grm Protein, 85 grm healthy fat  [x]Understanding Lab Values-Achieve FBG <100, Trig <150   [x]Basic Pathophysiology of Disease-Pre DM, Obesity []Food/Medication Interactions   [x]Food Diary-Using a Keto Macro Nutrient Calculator ,RD introduced myfitnesspal [x]Exercise-review at next session to encourage 150 min/week   [x]Lifestyle/Behavior Modification Techniques-calorie restriction, portion control []Medication, Mechanism of Action   [x]Label Reading-Serv/Fiber/Protein []Self Blood Glucose Monitoring   [x]Weight/BMI Goals--5% (307)weight loss ( in 6 weeks(March 16), 10% in 12 weeks (291lb)  [x]Other - Myplate Method, interactive food label reading   Other Notes:Patient and spouse have been following Keto Diet past 4 weeks  RD discussed healthy lifestyle behaviors and adoption of balanced nutrition and inclusion of complex carbohydrates  RD discussed high fat foods in Keto possibly contribute to worsening lipid panel from 4/2018 Trig 641 and 4/5/19 Glu level 122  Comprehension: []Excellent  [x]Very Good  []Good  []Fair   []Poor    Receptivity: []Excellent  [x]Very Good  []Good  []Fair   []Poor    Expected Compliance: []Excellent  [x]Very Good  []Good  []Fair   []Poor        Goals: 1  Domingo Bailey will achieve 5% (307lb) weight loss in 6 weeks(March 16) and 10% weight loss in 12 weeks (291lb)  2 Zack will present food journal to RD at next encounter with compliance to nutrition plan as outlined above     3 Zack will report 3-4 healthy food/behavuior changes from current at next encounter to RD (i e food label reading, decreased reliance on Keto diets)         Labs:  CMP  Lab Results   Component Value Date     11/06/2015    K 4 4 02/22/2016     02/22/2016    CO2 29 02/22/2016    ANIONGAP 10 11/06/2015    BUN 12 02/22/2016    CREATININE 1 02 02/22/2016    GLUCOSE 93 11/06/2015    CALCIUM 9 0 02/22/2016    AST 33 02/22/2016    ALT 71 02/22/2016    ALKPHOS 60 02/22/2016    PROT 8 3 (H) 11/06/2015    BILITOT 0 65 11/06/2015    EGFR >60 0 02/22/2016       BMP  Lab Results   Component Value Date    GLUCOSE 93 11/06/2015    CALCIUM 9 0 02/22/2016     11/06/2015    K 4 4 02/22/2016 CO2 29 02/22/2016     02/22/2016    BUN 12 02/22/2016    CREATININE 1 02 02/22/2016       Lipids  Lab Results   Component Value Date    CHOL 224 03/10/2015     Lab Results   Component Value Date    HDL 30 (L) 02/22/2016    HDL 32 03/10/2015     Lab Results   Component Value Date    LDLCALC  02/22/2016      Comment:      Calculated LDL invalid, triglycerides >400 mg/dl    LDLCALC LDL- unable to 03/10/2015     Lab Results   Component Value Date    TRIG 429 (H) 02/22/2016    TRIG 504 03/10/2015     No results found for: CHOLHDL    Hemoglobin A1C  Lab Results   Component Value Date    HGBA1C 5 2 07/14/2018       Fasting Glucose  No results found for: GLUF    Insulin     Thyroid  No results found for: TSH, U4QSQTS, S4FLHHU, THYROIDAB    Hepatic Function Panel  Lab Results   Component Value Date    ALT 71 02/22/2016    AST 33 02/22/2016    ALKPHOS 60 02/22/2016    BILITOT 0 65 11/06/2015       Celiac Disease Antibody Panel  No results found for: ENDOMYSIAL IGA, GLIADIN IGA, GLIADIN IGG, IGA, TISSUE TRANSGLUT AB, TTG IGA   Iron  No results found for: IRON, TIBC, FERRITIN    Vitamins  No results found for: VITAMIN B2   No results found for: NICOTINAMIDE, NICOTINIC ACID   No results found for: VITAMINB6  No results found for: JTVMRSCP36  No results found for: VITB5  No results found for: M9IIZQTB  No results found for: THYROGLB  No results found for: VITAMIN K   No results found for: 25-HYDROXY VIT D   No components found for: [address] [id number]

## 2020-02-13 ENCOUNTER — OFFICE VISIT (OUTPATIENT)
Dept: INFECTIOUS DISEASES | Facility: CLINIC | Age: 30
End: 2020-02-13
Payer: COMMERCIAL

## 2020-02-13 VITALS
DIASTOLIC BLOOD PRESSURE: 62 MMHG | BODY MASS INDEX: 45.77 KG/M2 | TEMPERATURE: 98.1 F | WEIGHT: 315 LBS | HEART RATE: 74 BPM | SYSTOLIC BLOOD PRESSURE: 108 MMHG

## 2020-02-13 DIAGNOSIS — F17.210 CIGARETTE NICOTINE DEPENDENCE WITHOUT COMPLICATION: ICD-10-CM

## 2020-02-13 DIAGNOSIS — R94.5 LIVER FUNCTION ABNORMALITY: ICD-10-CM

## 2020-02-13 DIAGNOSIS — B20 HIV DISEASE (HCC): Primary | ICD-10-CM

## 2020-02-13 DIAGNOSIS — R31.29 MICROHEMATURIA: ICD-10-CM

## 2020-02-13 DIAGNOSIS — I10 BENIGN ESSENTIAL HYPERTENSION: ICD-10-CM

## 2020-02-13 DIAGNOSIS — L73.9 FOLLICULITIS: ICD-10-CM

## 2020-02-13 DIAGNOSIS — R73.03 PREDIABETES: ICD-10-CM

## 2020-02-13 DIAGNOSIS — E66.01 MORBID OBESITY (HCC): ICD-10-CM

## 2020-02-13 PROCEDURE — 99215 OFFICE O/P EST HI 40 MIN: CPT | Performed by: INTERNAL MEDICINE

## 2020-02-13 PROCEDURE — 90670 PCV13 VACCINE IM: CPT | Performed by: INTERNAL MEDICINE

## 2020-02-13 PROCEDURE — 90471 IMMUNIZATION ADMIN: CPT | Performed by: INTERNAL MEDICINE

## 2020-02-13 NOTE — PROGRESS NOTES
Progress Note - Infectious Disease   Sven Navarro 34 y o  male MRN: [id number]  Unit/Bed#:  Encounter: [id number]      Impression/Plan:  1   HIV-doing well on Biktarvy with a CD4 count of greater than 1000 and a viral load of 50 copies  Recheck labs in 2 months and follow up in 3 months   Will begin pneumonia vaccine series today with Prevnar     2   Morbid Obesity-he has lost 11 lb! He has seen a dietitian  Stressed the importance of ongoing diet and weight loss      3   Hypertension-asymptomatic at this time   Blood pressure normal today  Follow-up with the primary      4   Folliculitis-improved but not completely resolved   Still await Dermatology evaluation which is scheduled      5  Microhematuria-recurrent  The patient is to see Urology     6  Liver function test abnormalities-mild   Suspect steatohepatitis  Still with slight elevation in the ALT   Stressed the importance of diet and exercise  he still has not gotten his right upper quadrant ultrasound  Check right upper quadrant ultrasound     7  Nicotine dependence-he has quit smoking since his last visit! Continue to encourage this behavior     8  Pre diabetes-patient to see endocrinology  Stressed the importance of diet and exercise and weight loss  Patient now following up with Endocrinology    9  Tachycardia-recheck heart rate is 73  No additional workup for now  Patient was provided medication, adherence and prevention education    Subjective:  Routine follow-up for HIV  Patient claims 100% adherence with Biktarvy    Patient denies any notable side effects  Overall the feeling well  The patient denies any fever chills or sweats, denies any nausea vomiting or diarrhea, denies any cough or shortness of breath  ROS: A complete 12 point ROS is negative other than that noted in the HPI    Followup portions patient history reviewed and updated as:   Allergies, current medications, past medical history, past social history, past surgical history, and the problem list    Objective:  Vitals:  Vitals:    02/13/20 0828   BP: 108/62   Pulse: (!) 112   Temp: 98 1 °F (36 7 °C)   Weight: (!) 145 kg (319 lb)       Physical Exam:   General Appearance:  Alert, interactive, appearing well,  nontoxic, no acute distress  Neck:   Supple without lymphadenopathy, no thyromegaly or masses   Throat: Oropharynx moist without lesions  Lungs:   Clear to auscultation bilaterally; no wheezes, rhonchi or rales; respirations unlabored   Heart:  Tachycardic; no murmur, rub or gallop   Abdomen:   Soft, non-tender, non-distended, positive bowel sounds  Extremities: No clubbing, cyanosis or edema   Skin: No new rashes or lesions  No draining wounds noted         Labs, Imaging, & Other studies:   All pertinent labs and imaging studies were personally reviewed    Creatinine 0 96, ALT 68, the remainder liver function tests are normal, urinalysis without proteinuria but still with micro hematuria, CD4 1042, HIV RNA 50 copies, white cell count 10 2, platelet count 937      Current Outpatient Medications:     albuterol (PROVENTIL HFA,VENTOLIN HFA) 90 mcg/act inhaler, Inhale 1-2 puffs, Disp: , Rfl:     bictegravir-emtricitab-tenofovir alafenamide (BIKTARVY) -25 MG tablet, Take 1 tablet by mouth daily with breakfast, Disp: 90 tablet, Rfl: 1    escitalopram (LEXAPRO) 10 mg tablet, Take 1 tablet by mouth daily, Disp: , Rfl:     levocetirizine (XYZAL) 5 MG tablet, Take 1 tablet by mouth daily, Disp: , Rfl:     lisdexamfetamine (VYVANSE) 50 MG capsule, Take 1 capsule by mouth Daily, Disp: , Rfl:     montelukast (SINGULAIR) 10 mg tablet, Take 1 tablet by mouth daily, Disp: , Rfl:     ziprasidone (GEODON) 20 mg capsule, Take 20 mg by mouth daily, Disp: , Rfl:

## 2020-02-13 NOTE — PATIENT INSTRUCTIONS
Continue taking your biktarvy daily  Please call us for refills  Please obtain new labs in April  Follow up with us in May

## 2020-03-09 ENCOUNTER — OFFICE VISIT (OUTPATIENT)
Dept: UROLOGY | Facility: AMBULATORY SURGERY CENTER | Age: 30
End: 2020-03-09
Payer: COMMERCIAL

## 2020-03-09 VITALS
WEIGHT: 315 LBS | HEIGHT: 70 IN | HEART RATE: 65 BPM | DIASTOLIC BLOOD PRESSURE: 80 MMHG | BODY MASS INDEX: 45.1 KG/M2 | SYSTOLIC BLOOD PRESSURE: 150 MMHG

## 2020-03-09 DIAGNOSIS — N52.9 ERECTILE DYSFUNCTION, UNSPECIFIED ERECTILE DYSFUNCTION TYPE: ICD-10-CM

## 2020-03-09 DIAGNOSIS — R31.29 MICROSCOPIC HEMATURIA: Primary | ICD-10-CM

## 2020-03-09 LAB
BACTERIA UR QL AUTO: NORMAL /HPF
BILIRUB UR QL STRIP: NEGATIVE
CLARITY UR: CLEAR
COLOR UR: YELLOW
GLUCOSE UR STRIP-MCNC: NEGATIVE MG/DL
HGB UR QL STRIP.AUTO: NEGATIVE
HYALINE CASTS #/AREA URNS LPF: NORMAL /LPF
KETONES UR STRIP-MCNC: NEGATIVE MG/DL
LEUKOCYTE ESTERASE UR QL STRIP: NEGATIVE
NITRITE UR QL STRIP: NEGATIVE
NON-SQ EPI CELLS URNS QL MICRO: NORMAL /HPF
PH UR STRIP.AUTO: 6.5 [PH]
PROT UR STRIP-MCNC: NEGATIVE MG/DL
RBC #/AREA URNS AUTO: NORMAL /HPF
SL AMB  POCT GLUCOSE, UA: NORMAL
SL AMB LEUKOCYTE ESTERASE,UA: NORMAL
SL AMB POCT BILIRUBIN,UA: NORMAL
SL AMB POCT BLOOD,UA: NORMAL
SL AMB POCT CLARITY,UA: CLEAR
SL AMB POCT COLOR,UA: YELLOW
SL AMB POCT KETONES,UA: NORMAL
SL AMB POCT NITRITE,UA: NORMAL
SL AMB POCT PH,UA: 7.5
SL AMB POCT SPECIFIC GRAVITY,UA: 1
SL AMB POCT URINE PROTEIN: NORMAL
SL AMB POCT UROBILINOGEN: 0.2
SP GR UR STRIP.AUTO: 1.01 (ref 1–1.03)
UROBILINOGEN UR QL STRIP.AUTO: 0.2 E.U./DL
WBC #/AREA URNS AUTO: NORMAL /HPF

## 2020-03-09 PROCEDURE — 81002 URINALYSIS NONAUTO W/O SCOPE: CPT | Performed by: NURSE PRACTITIONER

## 2020-03-09 PROCEDURE — 81001 URINALYSIS AUTO W/SCOPE: CPT | Performed by: NURSE PRACTITIONER

## 2020-03-09 PROCEDURE — 99213 OFFICE O/P EST LOW 20 MIN: CPT | Performed by: NURSE PRACTITIONER

## 2020-03-09 RX ORDER — SILDENAFIL 100 MG/1
100 TABLET, FILM COATED ORAL DAILY PRN
Qty: 10 TABLET | Refills: 0 | Status: SHIPPED | OUTPATIENT
Start: 2020-03-09 | End: 2020-05-27 | Stop reason: SDUPTHER

## 2020-03-09 RX ORDER — SILDENAFIL 100 MG/1
100 TABLET, FILM COATED ORAL DAILY PRN
Qty: 10 TABLET | Refills: 0 | Status: SHIPPED | OUTPATIENT
Start: 2020-03-09 | End: 2020-03-09

## 2020-03-09 RX ORDER — BUPROPION HYDROCHLORIDE 150 MG/1
TABLET ORAL
COMMUNITY
Start: 2020-02-27 | End: 2020-07-07 | Stop reason: ALTCHOICE

## 2020-03-09 NOTE — PROGRESS NOTES
3/9/2020      Chief Complaint   Patient presents with    Microhematuria     Assessment and Plan    27 y o  male managed by NEW PATIENT  1  Microscopic Hematuria  · Urine dip in office trace blood otherwise unremarkable  · Urinalysis with microscopy ordered  · US kidney and bladder ordered    2  Overactive Bladder symptoms   · CATALINA reveals a prostate of approximate 35 g no nodules noted  · Maintain adequate hydration upwards to 40-60 oz per day  · Avoid bladder irritants-coffee, soda, tea and spicy foods  · Continue to monitor urinary symptoms, if these worsen will consider the use of anticholinergic agents in an attempt to control symptoms    3  Erectile Dysfunction  · Sildenafil prescription provided  · Side effects discussed  · ER precautions discussed  · Patient with understanding    4  Family history Benign Prostatic Hyperplasia  · Father    5  Family history Prostate Cancer  · Grandfather (maternal)    History of Present Illness  Deric Menjivar is a 27 y o  male here for follow up evaluation of  microscopic hematuria, overactive bladder symptoms and erectile dysfunction  Patient reports microscopic hematuria noted on a urine dip in his PCPs office and is referred to the office for further evaluation  Patient also complains of overactive bladder symptoms; he reports urinary frequency and urgency and occasional weak urinary stream   Patient does report occasional erectile dysfunction  Patient reports difficulty maintaining an erection suitable for sexual activity  Patient denies smoking and the use/abuse of illicit substances and alcohol  Patient reports a significant family history of benign prostatic hyperplasia in his father and prostate cancer in his grandfather  Review of Systems   Constitutional: Negative for chills and fever  Respiratory: Negative for cough and shortness of breath  Cardiovascular: Negative for chest pain     Gastrointestinal: Negative for abdominal distention, abdominal pain, blood in stool, nausea and vomiting  Genitourinary: Positive for frequency and urgency  Negative for difficulty urinating, dysuria, enuresis, flank pain and hematuria  Weak urinary stream   Musculoskeletal: Negative for back pain  Skin: Negative for rash  Neurological: Negative for dizziness  AUA SYMPTOM SCORE      Most Recent Value   AUA SYMPTOM SCORE   How often have you had a sensation of not emptying your bladder completely after you finished urinating? 2   How often have you had to urinate again less than two hours after you finished urinating? 4   How often have you found you stopped and started again several times when you urinate? 1   How often have you found it difficult to postpone urination? 1   How often have you had a weak urinary stream?  2   How often have you had to push or strain to begin urination? 1   How many times did you most typically get up to urinate from the time you went to bed at night until the time you got up in the morning?   1   Quality of Life: If you were to spend the rest of your life with your urinary condition just the way it is now, how would you feel about that?  3   AUA SYMPTOM SCORE  12         Past Medical History  Past Medical History:   Diagnosis Date    Abnormal liver function tests     ADD (attention deficit disorder)     Asthma     Benign essential hypertension     Depression     Encounter for screening examination for sexually transmitted disease     [facility]     Hypercholesteremia     Microhematuria     [facility]     Seasonal allergies        Past Social History  Past Surgical History:   Procedure Laterality Date    TONSILLECTOMY       Social History     Tobacco Use   Smoking Status Former Smoker    Start date: 8/1/2019   Smokeless Tobacco Former User       Past Family History  Family History   Problem Relation Age of Onset    Hyperlipidemia Mother     Diabetes Father    Sherif Hyperlipidemia Father     Hypertension Father        Past Social history  Social History     Socioeconomic History    Marital status: Single     Spouse name: Not on file    Number of children: Not on file    Years of education: Not on file    Highest education level: Not on file   Occupational History    Not on file   Social Needs    Financial resource strain: Not on file    Food insecurity:     Worry: Not on file     Inability: Not on file    Transportation needs:     Medical: Not on file     Non-medical: Not on file   Tobacco Use    Smoking status: Former Smoker     Start date: 8/1/2019    Smokeless tobacco: Former User   Substance and Sexual Activity    Alcohol use: Yes     Comment: 1 drink per month    Drug use: Yes     Frequency: 7 0 times per week     Types: Marijuana    Sexual activity: Yes     Partners: Male   Lifestyle    Physical activity:     Days per week: Not on file     Minutes per session: Not on file    Stress: Not on file   Relationships    Social connections:     Talks on phone: Not on file     Gets together: Not on file     Attends [employer] service: Not on file     Active member of club or organization: Not on file     Attends meetings of clubs or organizations: Not on file     Relationship status: Not on file    Intimate partner violence:     Fear of current or ex partner: Not on file     Emotionally abused: Not on file     Physically abused: Not on file     Forced sexual activity: Not on file   Other Topics Concern    Not on file   Social History Narrative    Not on file       Current Medications  Current Outpatient Medications   Medication Sig Dispense Refill    albuterol (PROVENTIL HFA,VENTOLIN HFA) 90 mcg/act inhaler Inhale 1-2 puffs      bictegravir-emtricitab-tenofovir alafenamide (BIKTARVY) -25 MG tablet Take 1 tablet by mouth daily with breakfast 90 tablet 1    buPROPion (WELLBUTRIN XL) 150 mg 24 hr tablet TK 1 T PO QAM      escitalopram (LEXAPRO) 10 mg tablet Take 1 tablet by mouth daily      levocetirizine (XYZAL) 5 MG tablet Take 1 tablet by mouth daily      lisdexamfetamine (VYVANSE) 50 MG capsule Take 1 capsule by mouth Daily      montelukast (SINGULAIR) 10 mg tablet Take 1 tablet by mouth daily      ziprasidone (GEODON) 20 mg capsule Take 20 mg by mouth daily       No current facility-administered medications for this visit  Allergies  No Known Allergies      The following portions of the patient's history were reviewed and updated as appropriate: allergies, current medications, past medical history, past social history, past surgical history and problem list       Vitals  Vitals:    03/09/20 1415   BP: 150/80   BP Location: Left arm   Patient Position: Sitting   Cuff Size: Large   Pulse: 65   Weight: (!) 147 kg (323 lb)   Height: 5' 10" (1 778 m)     Physical Exam  Physical Exam   Constitutional: He is oriented to person, place, and time  He appears well-developed and well-nourished  Cardiovascular: Normal rate  Pulmonary/Chest: Effort normal    Abdominal: Soft  Genitourinary:   Genitourinary Comments: Prostate approximately 35 g smooth nontender  No nodules noted  Musculoskeletal: Normal range of motion  Neurological: He is alert and oriented to person, place, and time  Skin: Skin is warm  Vitals reviewed      Results  Recent Results (from the past 1 hour(s))   POCT urine dip    Collection Time: 03/09/20  2:20 PM   Result Value Ref Range    LEUKOCYTE ESTERASE,UA neg     NITRITE,UA oh=g     SL AMB POCT UROBILINOGEN 0 2     POCT URINE PROTEIN neg      PH,UA 7 5     BLOOD,UA trace     SPECIFIC GRAVITY,UA 1 005     KETONES,UA neg     BILIRUBIN,UA neg     GLUCOSE, UA neg      COLOR,UA yellow     CLARITY,UA clear      No results found for: PSA  Lab Results   Component Value Date    GLUCOSE 93 11/06/2015    CALCIUM 9 0 02/22/2016     11/06/2015    K 4 4 02/22/2016    CO2 29 02/22/2016     02/22/2016    BUN 12 02/22/2016 CREATININE 1 02 02/22/2016     Lab Results   Component Value Date    WBC 10 23 (H) 02/22/2016    WBC 10 0 02/22/2016    HGB 15 6 02/22/2016    HGB 15 4 02/22/2016    HCT 45 4 02/22/2016    HCT 46 5 02/22/2016    MCV 87 02/22/2016    MCV 90 02/22/2016     02/22/2016     02/22/2016     Orders  Orders Placed This Encounter   Procedures    Urinalysis with microscopic    POCT urine dip       DEVI Green

## 2020-03-09 NOTE — PATIENT INSTRUCTIONS
US kidney and bladder to be scheduled   UA sent from the office  Prescription for Sildenafil as directed

## 2020-03-10 LAB
CREAT ?TM UR-SCNC: 55.7 UMOL/L
HBA1C MFR BLD HPLC: 5.5 %
MICROALBUMIN/CREAT UR: NORMAL MG/G{CREAT}

## 2020-03-12 ENCOUNTER — OFFICE VISIT (OUTPATIENT)
Dept: ENDOCRINOLOGY | Facility: CLINIC | Age: 30
End: 2020-03-12
Payer: COMMERCIAL

## 2020-03-12 VITALS
DIASTOLIC BLOOD PRESSURE: 64 MMHG | HEIGHT: 70 IN | HEART RATE: 74 BPM | BODY MASS INDEX: 45.1 KG/M2 | WEIGHT: 315 LBS | SYSTOLIC BLOOD PRESSURE: 118 MMHG

## 2020-03-12 DIAGNOSIS — E66.01 MORBID OBESITY (HCC): ICD-10-CM

## 2020-03-12 DIAGNOSIS — E78.1 HYPERTRIGLYCERIDEMIA: Primary | ICD-10-CM

## 2020-03-12 DIAGNOSIS — E55.9 VITAMIN D DEFICIENCY: ICD-10-CM

## 2020-03-12 DIAGNOSIS — R73.03 PREDIABETES: ICD-10-CM

## 2020-03-12 DIAGNOSIS — I10 BENIGN ESSENTIAL HYPERTENSION: ICD-10-CM

## 2020-03-12 PROCEDURE — 99214 OFFICE O/P EST MOD 30 MIN: CPT | Performed by: PHYSICIAN ASSISTANT

## 2020-03-12 RX ORDER — MAG HYDROX/ALUMINUM HYD/SIMETH 400-400-40
SUSPENSION, ORAL (FINAL DOSE FORM) ORAL
Qty: 90 CAPSULE | Refills: 3
Start: 2020-03-12 | End: 2022-01-20 | Stop reason: SDUPTHER

## 2020-03-12 RX ORDER — ICOSAPENT ETHYL 1000 MG/1
CAPSULE ORAL
Qty: 360 CAPSULE | Refills: 1 | Status: SHIPPED | OUTPATIENT
Start: 2020-03-12 | End: 2020-06-10 | Stop reason: SDUPTHER

## 2020-03-12 RX ORDER — RISPERIDONE 1 MG/1
1.25 TABLET, FILM COATED ORAL DAILY
COMMUNITY
End: 2021-06-28 | Stop reason: SDUPTHER

## 2020-03-12 RX ORDER — MINOCYCLINE HYDROCHLORIDE 100 MG/1
100 TABLET ORAL DAILY
COMMUNITY
End: 2020-06-10

## 2020-03-12 NOTE — PROGRESS NOTES
Established Patient Progress Note       Chief Complaint   Patient presents with    Abnormal Endocrine Labs        History of Present Illness:     Krunal Gupta is a 27 y o  male with a history of Prediabetes, dyslipidemia, Vitamin D Deficiency, and Obesity  Since last visit he has met with dietician and has made dietary improvement  He is exercising regularly  He is following mediterranean diet and has lost about 20 pounds  He was considering saxenda, but would prefer weekly option if possible  He previously took vascepa for high triglycerides but stopped due to "fishy burps" but would like to resume  Previously had myalgias on statin/fibrate  Denies alcohol use  Denies history of pancreatitis  For Vitamin D Deficiency, he is taking Vit D 5,000 daily after high dose replacement          Patient Active Problem List   Diagnosis    HIV disease ([facility] [place] [facility] )    Liver function abnormality    [facility]    Benign essential hypertension    Microhematuria    Folliculitis    Cigarette nicotine dependence without complication    Prediabetes    Vitamin D deficiency    Hypertriglyceridemia      Past Medical History:   Diagnosis Date    Abnormal liver function tests     ADD (attention deficit disorder)     Asthma     Benign essential hypertension     Depression     Encounter for screening examination for sexually transmitted disease     [facility] immunodeficiency virus [facility]     Hypercholesteremia     Microhematuria     Morbid obesity ([facility]     Seasonal allergies       Past Surgical History:   Procedure Laterality Date    TONSILLECTOMY        Family History   Problem Relation Age of Onset    Hyperlipidemia Mother     Diabetes Father     Hyperlipidemia Father     Hypertension Father      Social History     Tobacco Use    Smoking status: Former Smoker     Packs/day: 0 50     Types: Cigarettes     Start date: 8/1/2019    Smokeless tobacco: Never Used   Substance Use Topics    Alcohol use: Yes     Frequency: Monthly or less     Drinks per session: 1 or 2     Binge frequency: Never     Comment: 1 drink per month     No Known Allergies    Current Outpatient Medications:     albuterol (PROVENTIL HFA,VENTOLIN HFA) 90 mcg/act inhaler, Inhale 1-2 puffs every 6 (six) hours as needed for wheezing , Disp: , Rfl:     bictegravir-emtricitab-tenofovir alafenamide (BIKTARVY) -25 MG tablet, Take 1 tablet by mouth daily with breakfast, Disp: 90 tablet, Rfl: 1    buPROPion (WELLBUTRIN XL) 150 mg 24 hr tablet, TK 1 T PO QAM, Disp: , Rfl:     escitalopram (LEXAPRO) 10 mg tablet, Take 1 tablet by mouth daily, Disp: , Rfl:     levocetirizine (XYZAL) 5 MG tablet, Take 1 tablet by mouth daily, Disp: , Rfl:     lisdexamfetamine (VYVANSE) 50 MG capsule, Take 1 capsule by mouth every morning , Disp: , Rfl:     minocycline (DYNACIN) 100 MG tablet, Take 100 mg by mouth daily, Disp: , Rfl:     risperiDONE (RisperDAL) 1 mg tablet, Take 1 mg by mouth daily, Disp: , Rfl:     sildenafil (VIAGRA) 100 mg tablet, Take 1 tablet (100 mg total) by mouth daily as needed for erectile dysfunction, Disp: 10 tablet, Rfl: 0    Cholecalciferol (VITAMIN D3) 125 MCG (5000 UT) CAPS, 1 cap daily, Disp: 90 capsule, Rfl: 3    VASCEPA 1 g CAPS, 2 caps twice per day, Disp: 360 capsule, Rfl: 1    Review of Systems   Constitutional: Negative for activity change, appetite change and fatigue  HENT: Negative for sore throat, trouble swallowing and voice change  Eyes: Negative for visual disturbance  Respiratory: Negative for choking, chest tightness and shortness of breath  Cardiovascular: Negative for chest pain, palpitations and leg swelling  Gastrointestinal: Negative for abdominal pain, constipation and diarrhea  Endocrine: Negative for cold intolerance, heat intolerance, polydipsia, polyphagia and polyuria  Genitourinary: Negative for frequency  Musculoskeletal: Negative for arthralgias and myalgias  Skin: Negative for rash  Neurological: Negative for dizziness and syncope  Hematological: Negative for adenopathy  Psychiatric/Behavioral: Negative for sleep disturbance  All other systems reviewed and are negative  Physical Exam:  Body mass index is 45 37 kg/m²  /64 (BP Location: Left arm, Patient Position: Sitting, Cuff Size: Large)   Pulse 74   Ht 5' 10" (1 778 m)   Wt (!) 143 kg (316 lb 3 2 oz)   BMI 45 37 kg/m²    Wt Readings from Last 3 Encounters:   03/12/20 (!) 143 kg (316 lb 3 2 oz)   03/09/20 (!) 147 kg (323 lb)   02/13/20 (!) 145 kg (319 lb)       Physical Exam   Constitutional: He is oriented to person, place, and time  He appears well-developed and well-nourished  No distress  HENT:   Head: Normocephalic and atraumatic  Mouth/Throat: Oropharynx is clear and moist    Eyes: Pupils are equal, round, and reactive to light  Conjunctivae and EOM are normal    Neck: Normal range of motion  Neck supple  No thyromegaly present  Cardiovascular: Normal rate, regular rhythm and normal heart sounds  No murmur heard  Pulmonary/Chest: Effort normal and breath sounds normal  No respiratory distress  He has no wheezes  He has no rales  Abdominal: Soft  Bowel sounds are normal  He exhibits no distension  There is no tenderness  Musculoskeletal: Normal range of motion  He exhibits no edema  Lymphadenopathy:     He has no cervical adenopathy  Neurological: He is alert and oriented to person, place, and time  Skin: Skin is warm and dry  Psychiatric: He has a normal mood and affect  Vitals reviewed  Labs: Impression & Plan:    Problem List Items Addressed This Visit        Cardiovascular and Mediastinum    Benign essential hypertension     BP normal today without medications  Continue to monitor  Continue lifestyle modifications            Other    Prediabetes     Continue lifestyle modifications and weight loss            Relevant Orders    Hemoglobin A1C Comprehensive metabolic panel    Vitamin D deficiency     Continue supplements 5,000 units daily OTC         Relevant Medications    Cholecalciferol (VITAMIN D3) 125 MCG (5000 UT) CAPS    Other Relevant Orders    Vitamin D 25 hydroxy    Hypertriglyceridemia - Primary     Continue lifestyle modifications         Relevant Medications    VASCEPA 1 g CAPS    Other Relevant Orders    Lipid Panel with Direct LDL reflex          Orders Placed This Encounter   Procedures    Hemoglobin A1C     Standing Status:   Future     Standing Expiration Date:   3/12/2021    Comprehensive metabolic panel     This is a patient instruction: Patient fasting for 8 hours or longer recommended  Standing Status:   Future     Standing Expiration Date:   3/12/2021    Lipid Panel with Direct LDL reflex     This is a patient instruction: This test requires patient fasting for 10-12 hours or longer  Drinking of black coffee or black tea is acceptable  Standing Status:   Future     Standing Expiration Date:   3/12/2021    Vitamin D 25 hydroxy     Standing Status:   Future     Standing Expiration Date:   3/12/2021       There are no Patient Instructions on file for this visit  Discussed with the patient and all questioned fully answered  He will call me if any problems arise  Follow-up appointment in 6 months       Counseled patient on diagnostic results, prognosis, risk and benefit of treatment options, instruction for management, importance of treatment compliance, Risk  factor reduction and impressions      Elvis Edmonds PA-C

## 2020-03-12 NOTE — ASSESSMENT & PLAN NOTE
Continue lifestyle modifications/weight loss  Will hold off on GLP-1 agonist for now due to risk of pancreatitis with very high triglycerides and taking other meds that increase of pancreatis  In future he would be interested in JÄRVENPÄÄ but would prefer to take a weekly GLP-1 agonist if possible

## 2020-03-21 ENCOUNTER — NURSE TRIAGE (OUTPATIENT)
Dept: PAIN MEDICINE | Facility: MEDICAL CENTER | Age: 30
End: 2020-03-21

## 2020-03-21 NOTE — TELEPHONE ENCOUNTER
Regarding: Coronavirus  ----- Message from Emilie Ormond sent at 3/21/2020  9:15 AM EDT -----  "My dad was tested + for COVID-19 and I want to know what actions to take given my medical history  "

## 2020-03-21 NOTE — TELEPHONE ENCOUNTER
RN s/w pt  Pt states his father who is a physician tested positive for COVID 19  Pt denies having any s/s no cough, no sob, no fever  no recent travel  Pt called to see if there was anything he needed to do since he is immunocompromised with his history  Pt states he is working from home  Per pt he is following the quarantine guidelines  Pt last saw his father 2 weeks ago which they feel was prior to his father's exposure  Pt was reminded to wash his hands, not touch his face, clean frequently touched surfaces, cough or sneeze into a tissue and social distancing  Per pt his father is also asymptomatic at this time and is under quarantine  Pt advised to f/u with his PCP and ID drs when offices are open       Reason for Disposition   [1] COVID-19 EXPOSURE (Close Contact) within last 14 days AND [2] NO cough, fever, or breathing difficulty    Additional Information   Negative: Severe difficulty breathing (e g , struggling for each breath, speak in single words, bluish lips)   Negative: Sounds like a life-threatening emergency to the triager   Negative: [1] Dry cough occurs AND [2] onset > 14 days after COVID-19 EXPOSURE   Negative: [1] Difficulty breathing (shortness of breath) occurs AND [2] onset > 14 days after COVID-19 EXPOSURE (Close Contact)   Negative: [1] Wet cough (i e , white-yellow, yellow, green, or gina colored sputum) AND [2] onset > 14 days after COVID-19 EXPOSURE   Negative: [1] Common cold symptoms AND [2] onset > 14 days after COVID-19 EXPOSURE   Negative: [1] Difficulty breathing occurs AND [2] within 14 days of COVID-19 EXPOSURE (Close Contact)   Negative: Patient sounds very sick or weak to the triager   Negative: [1] Fever or feeling feverish AND [2] within 14 Days of COVID-19 EXPOSURE (Close Contact)   Negative: [1] Cough occurs AND [2] within 14 days of COVID-19 EXPOSURE   Negative: [1] Fever (or feeling feverish) OR cough occurs AND [2] travel from or living in high risk area (identified by CDC) AND [3] within last 14 days   Negative: [1] COVID-19 EXPOSURE within last 14 days AND [2] mild body aches, chills, diarrhea, headache, runny nose, or sore throat occur   Negative: [1] COVID-19 EXPOSURE within last 14 days AND [2] NO cough, fever, or breathing difficulty AND [3] exposed person is a healthcare worker who was NOT using all recommended personal protective equipment (i e , a respirator-N95 mask, eye protection, gloves, and gown)   Negative: [1] Travel from or living in high risk area (identified by CDC) AND [2] within last 14 days AND [3] NO cough or fever or breathing difficulty   Negative: [1] COVID-19 EXPOSURE (Close Contact) AND [2] 15 or more days ago AND [3] NO cough or fever or breathing difficulty   Negative: [1] No COVID-19 EXPOSURE BUT [2] living with someone who was exposed and who has no fever or cough   Negative: [1] Caller concerned that exposure to COVID-19 occurred BUT [2] does not meet COVID-19 EXPOSURE criteria from CDC   Negative: COVID-19 testing, questions about who needs it   Negative: [1] No COVID-19 EXPOSURE BUT [2] questions about   Negative: [1] Diagnosed with Coronavirus Disease (COVID-19) AND [2] questions about home isolation    Protocols used: CORONAVIRUS (COVID-19) EXPOSURE-ADULT-

## 2020-05-22 ENCOUNTER — HOSPITAL ENCOUNTER (OUTPATIENT)
Dept: RADIOLOGY | Age: 30
Discharge: HOME | End: 2020-05-22
Attending: INTERNAL MEDICINE
Payer: COMMERCIAL

## 2020-05-22 DIAGNOSIS — R31.29 MICROSCOPIC HEMATURIA: ICD-10-CM

## 2020-05-22 DIAGNOSIS — R74.01 TRANSAMINITIS: ICD-10-CM

## 2020-05-22 PROCEDURE — 76770 US EXAM ABDO BACK WALL COMP: CPT

## 2020-05-22 PROCEDURE — 76705 ECHO EXAM OF ABDOMEN: CPT

## 2020-05-27 ENCOUNTER — TELEMEDICINE (OUTPATIENT)
Dept: UROLOGY | Facility: AMBULATORY SURGERY CENTER | Age: 30
End: 2020-05-27
Payer: COMMERCIAL

## 2020-05-27 DIAGNOSIS — R31.21 ASYMPTOMATIC MICROSCOPIC HEMATURIA: Primary | ICD-10-CM

## 2020-05-27 DIAGNOSIS — N52.9 ERECTILE DYSFUNCTION, UNSPECIFIED ERECTILE DYSFUNCTION TYPE: ICD-10-CM

## 2020-05-27 DIAGNOSIS — N32.81 OVERACTIVE BLADDER: ICD-10-CM

## 2020-05-27 PROCEDURE — 99213 OFFICE O/P EST LOW 20 MIN: CPT | Performed by: NURSE PRACTITIONER

## 2020-05-27 RX ORDER — SILDENAFIL 100 MG/1
100 TABLET, FILM COATED ORAL DAILY PRN
Qty: 10 TABLET | Refills: 0 | Status: SHIPPED | OUTPATIENT
Start: 2020-05-27 | End: 2020-06-23 | Stop reason: SDUPTHER

## 2020-06-04 ENCOUNTER — TELEPHONE (OUTPATIENT)
Dept: INFECTIOUS DISEASES | Facility: CLINIC | Age: 30
End: 2020-06-04

## 2020-06-05 LAB — HBA1C MFR BLD HPLC: 5.5 %

## 2020-06-10 ENCOUNTER — TELEPHONE (OUTPATIENT)
Dept: INFECTIOUS DISEASES | Facility: CLINIC | Age: 30
End: 2020-06-10

## 2020-06-10 ENCOUNTER — TELEMEDICINE (OUTPATIENT)
Dept: ENDOCRINOLOGY | Facility: CLINIC | Age: 30
End: 2020-06-10
Payer: COMMERCIAL

## 2020-06-10 DIAGNOSIS — I10 BENIGN ESSENTIAL HYPERTENSION: Primary | ICD-10-CM

## 2020-06-10 DIAGNOSIS — E66.01 CLASS 3 SEVERE OBESITY DUE TO EXCESS CALORIES WITH SERIOUS COMORBIDITY AND BODY MASS INDEX (BMI) OF 45.0 TO 49.9 IN ADULT (HCC): ICD-10-CM

## 2020-06-10 DIAGNOSIS — R73.03 PREDIABETES: ICD-10-CM

## 2020-06-10 DIAGNOSIS — E55.9 VITAMIN D DEFICIENCY: ICD-10-CM

## 2020-06-10 DIAGNOSIS — E78.1 HYPERTRIGLYCERIDEMIA: ICD-10-CM

## 2020-06-10 PROCEDURE — 99214 OFFICE O/P EST MOD 30 MIN: CPT | Performed by: INTERNAL MEDICINE

## 2020-06-10 RX ORDER — ICOSAPENT ETHYL 1000 MG/1
CAPSULE ORAL
Qty: 360 CAPSULE | Refills: 1 | Status: SHIPPED | OUTPATIENT
Start: 2020-06-10 | End: 2021-02-03 | Stop reason: ALTCHOICE

## 2020-06-10 RX ORDER — FENOFIBRATE 54 MG/1
54 TABLET ORAL DAILY
Qty: 30 TABLET | Refills: 3 | Status: SHIPPED | OUTPATIENT
Start: 2020-06-10 | End: 2020-07-07 | Stop reason: DRUGHIGH

## 2020-06-11 ENCOUNTER — OFFICE VISIT (OUTPATIENT)
Dept: INFECTIOUS DISEASES | Facility: CLINIC | Age: 30
End: 2020-06-11
Payer: COMMERCIAL

## 2020-06-11 VITALS
SYSTOLIC BLOOD PRESSURE: 145 MMHG | TEMPERATURE: 97.2 F | RESPIRATION RATE: 17 BRPM | BODY MASS INDEX: 42.7 KG/M2 | HEIGHT: 71 IN | HEART RATE: 79 BPM | WEIGHT: 305 LBS | DIASTOLIC BLOOD PRESSURE: 75 MMHG

## 2020-06-11 DIAGNOSIS — R94.5 LIVER FUNCTION ABNORMALITY: ICD-10-CM

## 2020-06-11 DIAGNOSIS — I10 BENIGN ESSENTIAL HYPERTENSION: ICD-10-CM

## 2020-06-11 DIAGNOSIS — E66.01 MORBID OBESITY (HCC): ICD-10-CM

## 2020-06-11 DIAGNOSIS — F17.210 CIGARETTE NICOTINE DEPENDENCE WITHOUT COMPLICATION: ICD-10-CM

## 2020-06-11 DIAGNOSIS — R31.29 MICROHEMATURIA: ICD-10-CM

## 2020-06-11 DIAGNOSIS — B20 HIV DISEASE (HCC): Primary | ICD-10-CM

## 2020-06-11 DIAGNOSIS — L73.9 FOLLICULITIS: ICD-10-CM

## 2020-06-11 DIAGNOSIS — B20 HIV DISEASE (HCC): ICD-10-CM

## 2020-06-11 DIAGNOSIS — R73.03 PREDIABETES: ICD-10-CM

## 2020-06-11 PROCEDURE — 99215 OFFICE O/P EST HI 40 MIN: CPT | Performed by: INTERNAL MEDICINE

## 2020-06-11 PROCEDURE — 90471 IMMUNIZATION ADMIN: CPT | Performed by: INTERNAL MEDICINE

## 2020-06-11 PROCEDURE — 90670 PCV13 VACCINE IM: CPT | Performed by: INTERNAL MEDICINE

## 2020-06-16 ENCOUNTER — TELEMEDICINE (OUTPATIENT)
Dept: DIABETES SERVICES | Facility: CLINIC | Age: 30
End: 2020-06-16

## 2020-06-16 DIAGNOSIS — R73.03 PRE-DIABETES: Primary | ICD-10-CM

## 2020-06-16 PROCEDURE — NC001 PR NO CHARGE: Performed by: DIETITIAN, REGISTERED

## 2020-06-23 DIAGNOSIS — N52.9 ERECTILE DYSFUNCTION, UNSPECIFIED ERECTILE DYSFUNCTION TYPE: ICD-10-CM

## 2020-06-23 RX ORDER — SILDENAFIL 100 MG/1
100 TABLET, FILM COATED ORAL DAILY PRN
Qty: 90 TABLET | Refills: 0 | Status: SHIPPED | OUTPATIENT
Start: 2020-06-23 | End: 2020-06-24 | Stop reason: SDUPTHER

## 2020-06-24 RX ORDER — SILDENAFIL 100 MG/1
100 TABLET, FILM COATED ORAL DAILY PRN
Qty: 90 TABLET | Refills: 0 | Status: SHIPPED | OUTPATIENT
Start: 2020-06-24 | End: 2021-02-28 | Stop reason: SDUPTHER

## 2020-07-07 ENCOUNTER — TELEPHONE (OUTPATIENT)
Dept: ENDOCRINOLOGY | Facility: CLINIC | Age: 30
End: 2020-07-07

## 2020-07-07 DIAGNOSIS — E78.1 HYPERTRIGLYCERIDEMIA: Primary | ICD-10-CM

## 2020-07-07 RX ORDER — FENOFIBRATE 145 MG/1
145 TABLET, COATED ORAL DAILY
Qty: 30 TABLET | Refills: 1 | Status: SHIPPED | OUTPATIENT
Start: 2020-07-07 | End: 2020-08-22 | Stop reason: SDUPTHER

## 2020-07-07 NOTE — TELEPHONE ENCOUNTER
Patient would like a call back regarding recent blood tests  They are under media tab  Please review

## 2020-07-08 ENCOUNTER — TELEPHONE (OUTPATIENT)
Dept: ENDOCRINOLOGY | Facility: CLINIC | Age: 30
End: 2020-07-08

## 2020-07-08 DIAGNOSIS — R73.03 PREDIABETES: Primary | ICD-10-CM

## 2020-07-08 NOTE — TELEPHONE ENCOUNTER
----- Message from Darío Brice MD sent at 7/7/2020 10:14 PM EDT -----  Please call the patient regarding labs - no , dont increase it now - please also order A1C in 6 weeks

## 2020-07-08 NOTE — TELEPHONE ENCOUNTER
Pt informed not increase medication at this time    Hgb A1C ordered to be repeated in 6 weeks  Script mailed to patient

## 2020-08-03 ENCOUNTER — OFFICE VISIT (OUTPATIENT)
Dept: PODIATRY | Facility: CLINIC | Age: 30
End: 2020-08-03
Payer: COMMERCIAL

## 2020-08-03 VITALS
TEMPERATURE: 97.8 F | WEIGHT: 310.4 LBS | SYSTOLIC BLOOD PRESSURE: 130 MMHG | DIASTOLIC BLOOD PRESSURE: 83 MMHG | BODY MASS INDEX: 43.45 KG/M2 | HEIGHT: 71 IN | HEART RATE: 75 BPM

## 2020-08-03 DIAGNOSIS — M10.072 ACUTE IDIOPATHIC GOUT OF LEFT FOOT: Primary | ICD-10-CM

## 2020-08-03 PROCEDURE — 99202 OFFICE O/P NEW SF 15 MIN: CPT | Performed by: PODIATRIST

## 2020-08-03 RX ORDER — VORTIOXETINE 10 MG/1
TABLET, FILM COATED ORAL
COMMUNITY
Start: 2020-07-09 | End: 2020-12-23 | Stop reason: DRUGHIGH

## 2020-08-03 RX ORDER — INDOMETHACIN 75 MG/1
75 CAPSULE, EXTENDED RELEASE ORAL 2 TIMES DAILY WITH MEALS
Qty: 14 CAPSULE | Refills: 0 | Status: SHIPPED | OUTPATIENT
Start: 2020-08-03 | End: 2020-09-23 | Stop reason: ALTCHOICE

## 2020-08-03 NOTE — PROGRESS NOTES
Assessment/Plan:    Explained to patient that symptoms remain consistent for an acute gout attack  Rather than utilizing naproxen when he is hurting, patient given prescription for indomethacin 75 mg b i d   Patient will contact me should he have a another attack in the near future  He does not need allopurinol at this time due to long  intervals between attacks  No problem-specific Assessment & Plan notes found for this encounter  Diagnoses and all orders for this visit:    Acute idiopathic gout of left foot  -     indomethacin (INDOCIN SR) 75 mg CR capsule; Take 1 capsule (75 mg total) by mouth 2 (two) times a day with meals for 7 days    Other orders  -     Trintellix 10 MG tablet; TK 1 T PO QAM          Subjective:      Patient ID: Kristina Dalton is a 27 y o  male  HPI     Patient, a 40-year-old male presents for assessment of his left great toe joint  Approximately 2 weeks ago, this joint became red hot and swollen due to an apparent gout attack  Patient states that he typically gets a gout attack 1 time per year  Naprosyn usually is helpful but this time it took quite a while before it kicked in  He now only has mild discomfort in the left great toe joint  Uric acid levels have been within normal limits in the past   There is a strong family history of gout however  The last time he was seen here for this problem was 2015  The following portions of the patient's history were reviewed and updated as appropriate: allergies, current medications, past family history, past medical history, past social history, past surgical history and problem list     Review of Systems   Constitutional:        Overweight   Cardiovascular: Negative  Gastrointestinal: Negative  Musculoskeletal: Negative  Neurological: Negative                Objective:      /83   Pulse 75   Temp 97 8 °F (36 6 °C) (Tympanic)   Ht 5' 11" Comment: verbal  Wt (!) 141 kg (310 lb 6 4 oz)   BMI 43 29 kg/m² Physical Exam   Cardiovascular: Normal pulses  Musculoskeletal:         General: Deformity present  No swelling, tenderness or signs of injury  Right lower leg: No edema  Left lower leg: No edema  Comments: Mild inflammation and discomfort left 1st MPJ  No right foot pathology noted  Neurological: He is alert  No sensory deficit  Skin: Skin is warm  No erythema

## 2020-08-21 LAB — HBA1C MFR BLD HPLC: 5.4 %

## 2020-08-22 DIAGNOSIS — E78.1 HYPERTRIGLYCERIDEMIA: ICD-10-CM

## 2020-08-24 RX ORDER — FENOFIBRATE 145 MG/1
145 TABLET, COATED ORAL DAILY
Qty: 90 TABLET | Refills: 1 | Status: SHIPPED | OUTPATIENT
Start: 2020-08-24 | End: 2021-02-07 | Stop reason: SDUPTHER

## 2020-08-28 ENCOUNTER — TELEPHONE (OUTPATIENT)
Dept: ENDOCRINOLOGY | Facility: CLINIC | Age: 30
End: 2020-08-28

## 2020-08-28 NOTE — RESULT ENCOUNTER NOTE
Please call the patient regarding labs - triglycerides improved from above 500 to 364 , still high but improved , continue current meds and watch diet

## 2020-08-28 NOTE — TELEPHONE ENCOUNTER
----- Message from Clementina Banks MD sent at 8/28/2020 10:07 AM EDT -----  Please call the patient regarding labs - triglycerides improved from above 500 to 364 , still high but improved , continue current meds and watch diet

## 2020-09-03 DIAGNOSIS — R73.03 PREDIABETES: Primary | ICD-10-CM

## 2020-09-03 RX ORDER — SEMAGLUTIDE 1.34 MG/ML
INJECTION, SOLUTION SUBCUTANEOUS
Qty: 3 PEN | Refills: 3 | Status: SHIPPED | OUTPATIENT
Start: 2020-09-03 | End: 2020-10-27 | Stop reason: ALTCHOICE

## 2020-09-22 ENCOUNTER — TELEPHONE (OUTPATIENT)
Dept: INFECTIOUS DISEASES | Facility: CLINIC | Age: 30
End: 2020-09-22

## 2020-09-23 ENCOUNTER — OFFICE VISIT (OUTPATIENT)
Dept: INFECTIOUS DISEASES | Facility: CLINIC | Age: 30
End: 2020-09-23
Payer: COMMERCIAL

## 2020-09-23 VITALS
RESPIRATION RATE: 17 BRPM | SYSTOLIC BLOOD PRESSURE: 138 MMHG | HEIGHT: 70 IN | TEMPERATURE: 96.3 F | HEART RATE: 74 BPM | WEIGHT: 313 LBS | DIASTOLIC BLOOD PRESSURE: 70 MMHG | BODY MASS INDEX: 44.81 KG/M2

## 2020-09-23 DIAGNOSIS — F17.210 CIGARETTE NICOTINE DEPENDENCE WITHOUT COMPLICATION: ICD-10-CM

## 2020-09-23 DIAGNOSIS — I10 BENIGN ESSENTIAL HYPERTENSION: ICD-10-CM

## 2020-09-23 DIAGNOSIS — B20 HIV DISEASE (HCC): ICD-10-CM

## 2020-09-23 DIAGNOSIS — B20 HIV DISEASE (HCC): Primary | ICD-10-CM

## 2020-09-23 DIAGNOSIS — E66.01 MORBID OBESITY (HCC): ICD-10-CM

## 2020-09-23 DIAGNOSIS — R94.5 LIVER FUNCTION ABNORMALITY: ICD-10-CM

## 2020-09-23 DIAGNOSIS — E78.1 HYPERTRIGLYCERIDEMIA: ICD-10-CM

## 2020-09-23 DIAGNOSIS — R73.03 PREDIABETES: ICD-10-CM

## 2020-09-23 PROCEDURE — 99215 OFFICE O/P EST HI 40 MIN: CPT | Performed by: INTERNAL MEDICINE

## 2020-09-23 NOTE — PROGRESS NOTES
Progress Note - Infectious Disease   Erlin Garner 27 y o  male MRN: [id number]  Unit/Bed#:  Encounter: [id number]      Impression/Plan:  1   HIV-doing well on Biktarvy with an undetectable viral load and a CD4 count of greater than 1000   Recheck labs in 2 months and follow up in 3 months       2   Morbid Obesity-his gained weight since the last visit    [facility] continues to see a dietitian  Stressed the importance of ongoing diet and exercise for weight loss  Now on Ozempic which should not interact with the Lilliana Bran3 at this time   Blood pressure mildly elevated today today   Follow-up with the primary      4   Folliculitis-resolved with prolonged course of minocycline and then doxycycline      5  Microhematuria-recurrent   renal ultrasound negative except with moderate postvoid residual   Follow-up with Urology     6  Liver function test abnormalities-mild   Suspect steatohepatitis   Ultrasound consistent with hepatic steatosis   Liver function test have now normalized  Will continue monitor LFTs  Stressed importance of ongoing weight loss     7  Nicotine dependence-he has quit smoking   Continue to encourage this behavior     8  Pre diabetes-hemoglobin A1c of only 5 4 on repeat  Follow-up with endocrinology    9  Hypertriglyceridemia- Follow-up with endocrinology  Remains on fenofibrate      Patient was provided medication, adherence and prevention education    Subjective:  Routine follow-up for HIV  Patient claims 100% adherence with Biktarvy    Patient denies any notable side effects  Overall the feeling well  The patient denies any fever chills or sweats, denies any nausea vomiting or diarrhea, denies any cough or shortness of breath  The patient had a gouty attack since his last visit  ROS:  A complete review of systems is negative other than that noted above in the subjective    Followup portions patient history reviewed and updated as:   Allergies, current medications, past medical history, past social history, past surgical history, and the problem list    Objective:  Vitals:  Vitals:    09/23/20 0755   BP: 138/70   Pulse: 74   Resp: 17   Temp: (!) 96 3 °F (35 7 °C)   Weight: (!) 142 kg (313 lb)   Height: 5' 10" (1 778 m)       Physical Exam:   General Appearance:  Alert, interactive, appearing well,  nontoxic, no acute distress  Neck:   Supple without lymphadenopathy, no thyromegaly or masses   Throat: Oropharynx moist without lesions  Lungs:   Clear to auscultation bilaterally; no wheezes, rhonchi or rales; respirations unlabored   Heart:  RRR; no murmur, rub or gallop   Abdomen:   Soft, non-tender, non-distended, positive bowel sounds  Extremities: No clubbing, cyanosis or edema   Skin: No new rashes or lesions  No draining wounds noted         Labs, Imaging, & Other studies:   All pertinent labs and imaging studies were personally reviewed    Creatinine 1 07, liver function tests normal, CD4 1002 in 32, white blood cell count 10 4, platelet count 495, HIV RNA less than 20 copies    Current Outpatient Medications:     albuterol (PROVENTIL HFA,VENTOLIN HFA) 90 mcg/act inhaler, Inhale 1-2 puffs every 6 (six) hours as needed for wheezing , Disp: , Rfl:     bictegravir-emtricitab-tenofovir alafenamide (BIKTARVY) -25 MG tablet, Take 1 tablet by mouth daily with breakfast, Disp: 30 tablet, Rfl: 3    Cholecalciferol (VITAMIN D3) 125 MCG (5000 UT) CAPS, 1 cap daily, Disp: 90 capsule, Rfl: 3    fenofibrate (TRICOR) 145 mg tablet, Take 1 tablet (145 mg total) by mouth daily, Disp: 90 tablet, Rfl: 1    indomethacin (INDOCIN SR) 75 mg CR capsule, Take 1 capsule (75 mg total) by mouth 2 (two) times a day with meals for 7 days, Disp: 14 capsule, Rfl: 0    levocetirizine (XYZAL) 5 MG tablet, Take 1 tablet by mouth daily, Disp: , Rfl:     lisdexamfetamine (VYVANSE) 50 MG capsule, Take 1 capsule by mouth every morning , Disp: , Rfl:     risperiDONE (RisperDAL) 1 mg tablet, Take 1 mg by mouth daily, Disp: , Rfl:     Semaglutide,0 25 or 0 5MG/DOS, (Ozempic, 0 25 or 0 5 MG/DOSE,) 2 MG/1 5ML SOPN, Inject 0 5 mg weekly, Disp: 3 pen, Rfl: 3    sildenafil (VIAGRA) 100 mg tablet, Take 1 tablet (100 mg total) by mouth daily as needed for erectile dysfunction, Disp: 90 tablet, Rfl: 0    Trintellix 10 MG tablet, TK 1 T PO QAM, Disp: , Rfl:     VASCEPA 1 g CAPS, 2 caps twice per day, Disp: 360 capsule, Rfl: 1

## 2020-10-27 ENCOUNTER — TELEMEDICINE (OUTPATIENT)
Dept: ENDOCRINOLOGY | Facility: CLINIC | Age: 30
End: 2020-10-27
Payer: COMMERCIAL

## 2020-10-27 DIAGNOSIS — K64.9 HEMORRHOIDS, UNSPECIFIED HEMORRHOID TYPE: ICD-10-CM

## 2020-10-27 DIAGNOSIS — R73.03 PREDIABETES: Primary | ICD-10-CM

## 2020-10-27 DIAGNOSIS — K59.00 CONSTIPATION, UNSPECIFIED CONSTIPATION TYPE: ICD-10-CM

## 2020-10-27 DIAGNOSIS — E66.01 MORBID OBESITY (HCC): ICD-10-CM

## 2020-10-27 DIAGNOSIS — E78.1 HYPERTRIGLYCERIDEMIA: ICD-10-CM

## 2020-10-27 DIAGNOSIS — E55.9 VITAMIN D DEFICIENCY: ICD-10-CM

## 2020-10-27 PROCEDURE — 99214 OFFICE O/P EST MOD 30 MIN: CPT | Performed by: PHYSICIAN ASSISTANT

## 2020-10-27 RX ORDER — SEMAGLUTIDE 1.34 MG/ML
INJECTION, SOLUTION SUBCUTANEOUS
Qty: 2 PEN | Refills: 5 | Status: SHIPPED | OUTPATIENT
Start: 2020-10-27 | End: 2020-12-23

## 2020-11-20 ENCOUNTER — CLINICAL SUPPORT (OUTPATIENT)
Dept: INFECTIOUS DISEASES | Facility: CLINIC | Age: 30
End: 2020-11-20
Payer: COMMERCIAL

## 2020-11-20 VITALS — TEMPERATURE: 97.7 F

## 2020-11-20 DIAGNOSIS — B20 HIV DISEASE (HCC): Primary | ICD-10-CM

## 2020-11-20 PROCEDURE — 90471 IMMUNIZATION ADMIN: CPT | Performed by: INTERNAL MEDICINE

## 2020-11-20 PROCEDURE — 90682 RIV4 VACC RECOMBINANT DNA IM: CPT | Performed by: INTERNAL MEDICINE

## 2020-12-09 DIAGNOSIS — B20 HIV DISEASE (HCC): Primary | ICD-10-CM

## 2020-12-09 LAB
ALBUMIN SERPL-MCNC: 4.8 G/DL (ref 4.1–5.2)
ALBUMIN/GLOB SERPL: 1.8 {RATIO} (ref 1.2–2.2)
ALP SERPL-CCNC: 66 IU/L (ref 39–117)
ALT SERPL-CCNC: 27 IU/L (ref 0–44)
AST SERPL-CCNC: 20 IU/L (ref 0–40)
BASOPHILS # BLD AUTO: 0.1 X10E3/UL (ref 0–0.2)
BASOPHILS NFR BLD AUTO: 1 %
BILIRUB SERPL-MCNC: <0.2 MG/DL (ref 0–1.2)
BUN SERPL-MCNC: 16 MG/DL (ref 6–20)
BUN/CREAT SERPL: 14 (ref 9–20)
C TRACH RRNA SPEC QL NAA+PROBE: NEGATIVE
CALCIUM SERPL-MCNC: 9.7 MG/DL (ref 8.7–10.2)
CD3+CD4+ CELLS # BLD: 1427 /UL (ref 359–1519)
CD3+CD4+ CELLS NFR BLD: 44.6 % (ref 30.8–58.5)
CHLORIDE SERPL-SCNC: 102 MMOL/L (ref 96–106)
CO2 SERPL-SCNC: 21 MMOL/L (ref 20–29)
CREAT SERPL-MCNC: 1.12 MG/DL (ref 0.76–1.27)
EOSINOPHIL # BLD AUTO: 0.2 X10E3/UL (ref 0–0.4)
EOSINOPHIL NFR BLD AUTO: 2 %
ERYTHROCYTE [DISTWIDTH] IN BLOOD BY AUTOMATED COUNT: 13 % (ref 11.6–15.4)
GAMMA INTERFERON BACKGROUND BLD IA-ACNC: 0.06 IU/ML
GLOBULIN SER-MCNC: 2.7 G/DL (ref 1.5–4.5)
GLUCOSE SERPL-MCNC: 120 MG/DL (ref 65–99)
HCT VFR BLD AUTO: 42.6 % (ref 37.5–51)
HGB BLD-MCNC: 15.1 G/DL (ref 13–17.7)
HIV1 RNA # SERPL NAA+PROBE: <20 COPIES/ML
HIV1 RNA SERPL NAA+PROBE-LOG#: NORMAL LOG10COPY/ML
IMM GRANULOCYTES # BLD: 0.1 X10E3/UL (ref 0–0.1)
IMM GRANULOCYTES NFR BLD: 1 %
LYMPHOCYTES # BLD AUTO: 3.2 X10E3/UL (ref 0.7–3.1)
LYMPHOCYTES NFR BLD AUTO: 31 %
M TB IFN-G CD4+ T-CELLS BLD-ACNC: 0.2 IU/ML
M TB IFN-G CD4+ T-CELLS BLD-ACNC: 0.21 IU/ML
MCH RBC QN AUTO: 31.5 PG (ref 26.6–33)
MCHC RBC AUTO-ENTMCNC: 35.4 G/DL (ref 31.5–35.7)
MCV RBC AUTO: 89 FL (ref 79–97)
MITOGEN IGNF BLD-ACNC: >10 IU/ML
MONOCYTES # BLD AUTO: 0.6 X10E3/UL (ref 0.1–0.9)
MONOCYTES NFR BLD AUTO: 6 %
N GONORRHOEA RRNA SPEC QL NAA+PROBE: NEGATIVE
NEUTROPHILS # BLD AUTO: 6 X10E3/UL (ref 1.4–7)
NEUTROPHILS NFR BLD AUTO: 59 %
PLATELET # BLD AUTO: 348 X10E3/UL (ref 150–450)
POTASSIUM SERPL-SCNC: 4.3 MMOL/L (ref 3.5–5.2)
PROT SERPL-MCNC: 7.5 G/DL (ref 6–8.5)
QUANTIFERON INCUBATION COMMENT: NORMAL
QUANTIFERON-TB GOLD PLUS: NEGATIVE
RBC # BLD AUTO: 4.79 X10E6/UL (ref 4.14–5.8)
RPR SER QL: NON REACTIVE
SERVICE CMNT-IMP: NORMAL
SL AMB EGFR AFRICAN AMERICAN: 101 ML/MIN/1.73
SL AMB EGFR NON AFRICAN AMERICAN: 88 ML/MIN/1.73
SODIUM SERPL-SCNC: 138 MMOL/L (ref 134–144)
WBC # BLD AUTO: 10.1 X10E3/UL (ref 3.4–10.8)

## 2020-12-10 ENCOUNTER — OFFICE VISIT (OUTPATIENT)
Dept: INFECTIOUS DISEASES | Facility: CLINIC | Age: 30
End: 2020-12-10
Payer: COMMERCIAL

## 2020-12-10 VITALS
HEART RATE: 84 BPM | SYSTOLIC BLOOD PRESSURE: 128 MMHG | TEMPERATURE: 98.7 F | BODY MASS INDEX: 44.75 KG/M2 | HEIGHT: 70 IN | WEIGHT: 312.6 LBS | DIASTOLIC BLOOD PRESSURE: 74 MMHG

## 2020-12-10 DIAGNOSIS — B20 HIV DISEASE (HCC): Primary | ICD-10-CM

## 2020-12-10 DIAGNOSIS — R31.29 MICROHEMATURIA: ICD-10-CM

## 2020-12-10 DIAGNOSIS — I10 BENIGN ESSENTIAL HYPERTENSION: ICD-10-CM

## 2020-12-10 DIAGNOSIS — E66.01 MORBID OBESITY (HCC): ICD-10-CM

## 2020-12-10 DIAGNOSIS — R94.5 LIVER FUNCTION ABNORMALITY: ICD-10-CM

## 2020-12-10 DIAGNOSIS — R73.03 PREDIABETES: ICD-10-CM

## 2020-12-10 DIAGNOSIS — B20 HIV DISEASE (HCC): ICD-10-CM

## 2020-12-10 PROCEDURE — 99215 OFFICE O/P EST HI 40 MIN: CPT | Performed by: INTERNAL MEDICINE

## 2020-12-18 ENCOUNTER — TELEPHONE (OUTPATIENT)
Dept: ENDOCRINOLOGY | Facility: CLINIC | Age: 30
End: 2020-12-18

## 2020-12-23 ENCOUNTER — OFFICE VISIT (OUTPATIENT)
Dept: ENDOCRINOLOGY | Facility: CLINIC | Age: 30
End: 2020-12-23
Payer: COMMERCIAL

## 2020-12-23 VITALS
WEIGHT: 315 LBS | SYSTOLIC BLOOD PRESSURE: 122 MMHG | DIASTOLIC BLOOD PRESSURE: 80 MMHG | BODY MASS INDEX: 45.1 KG/M2 | HEART RATE: 79 BPM | HEIGHT: 70 IN

## 2020-12-23 DIAGNOSIS — I10 BENIGN ESSENTIAL HYPERTENSION: ICD-10-CM

## 2020-12-23 DIAGNOSIS — R73.03 PREDIABETES: ICD-10-CM

## 2020-12-23 DIAGNOSIS — E66.01 MORBID OBESITY (HCC): Primary | ICD-10-CM

## 2020-12-23 DIAGNOSIS — E55.9 VITAMIN D DEFICIENCY: ICD-10-CM

## 2020-12-23 DIAGNOSIS — E78.1 HYPERTRIGLYCERIDEMIA: ICD-10-CM

## 2020-12-23 LAB
ALBUMIN SERPL-MCNC: 4.9 G/DL (ref 4.1–5.2)
ALBUMIN/GLOB SERPL: 1.6 {RATIO} (ref 1.2–2.2)
ALP SERPL-CCNC: 46 IU/L (ref 39–117)
ALT SERPL-CCNC: 35 IU/L (ref 0–44)
AST SERPL-CCNC: 22 IU/L (ref 0–40)
BILIRUB SERPL-MCNC: 0.3 MG/DL (ref 0–1.2)
BUN SERPL-MCNC: 14 MG/DL (ref 6–20)
BUN/CREAT SERPL: 13 (ref 9–20)
CALCIUM SERPL-MCNC: 10 MG/DL (ref 8.7–10.2)
CHLORIDE SERPL-SCNC: 103 MMOL/L (ref 96–106)
CHOLEST SERPL-MCNC: 241 MG/DL (ref 100–199)
CO2 SERPL-SCNC: 20 MMOL/L (ref 20–29)
CREAT SERPL-MCNC: 1.06 MG/DL (ref 0.76–1.27)
GLOBULIN SER-MCNC: 3.1 G/DL (ref 1.5–4.5)
GLUCOSE SERPL-MCNC: 99 MG/DL (ref 65–99)
HBA1C MFR BLD: 5.4 % (ref 4.8–5.6)
HDLC SERPL-MCNC: 31 MG/DL
LDLC SERPL CALC-MCNC: 149 MG/DL (ref 0–99)
POTASSIUM SERPL-SCNC: 4.8 MMOL/L (ref 3.5–5.2)
PROT SERPL-MCNC: 8 G/DL (ref 6–8.5)
SL AMB EGFR AFRICAN AMERICAN: 108 ML/MIN/1.73
SL AMB EGFR NON AFRICAN AMERICAN: 94 ML/MIN/1.73
SODIUM SERPL-SCNC: 138 MMOL/L (ref 134–144)
TRIGL SERPL-MCNC: 327 MG/DL (ref 0–149)

## 2020-12-23 PROCEDURE — 99214 OFFICE O/P EST MOD 30 MIN: CPT | Performed by: INTERNAL MEDICINE

## 2020-12-23 RX ORDER — VORTIOXETINE 20 MG/1
20 TABLET, FILM COATED ORAL DAILY
COMMUNITY
Start: 2020-12-14 | End: 2021-02-03 | Stop reason: ALTCHOICE

## 2020-12-23 RX ORDER — RISPERIDONE 0.25 MG/1
0.25 TABLET, FILM COATED ORAL
COMMUNITY
Start: 2020-12-11 | End: 2021-06-28 | Stop reason: ALTCHOICE

## 2020-12-30 ENCOUNTER — TELEPHONE (OUTPATIENT)
Dept: ENDOCRINOLOGY | Facility: CLINIC | Age: 30
End: 2020-12-30

## 2020-12-31 ENCOUNTER — TELEPHONE (OUTPATIENT)
Dept: ENDOCRINOLOGY | Facility: CLINIC | Age: 30
End: 2020-12-31

## 2020-12-31 NOTE — TELEPHONE ENCOUNTER
[employer] would like to speak to an [place]  Ref # [id number]    Please call to discuss the Pre-auth for Bernie  She will also fax the request     Thank you!

## 2021-01-04 DIAGNOSIS — E66.01 MORBID OBESITY (HCC): ICD-10-CM

## 2021-01-04 DIAGNOSIS — R73.03 PREDIABETES: ICD-10-CM

## 2021-01-04 NOTE — TELEPHONE ENCOUNTER
Pt called and said that the pens come in a box of 5 so asked to have that changed and also that he needed the pen needles also

## 2021-01-05 NOTE — TELEPHONE ENCOUNTER
Representative transferred my call to [facility] [employer] number [phone]

## 2021-01-05 NOTE — TELEPHONE ENCOUNTER
Called pt to see if he received a call from his insurance regarding [address] Prior authorization  Pt requesting a new prior authorization for Saxenda medication since he received a new  Insurance with a new ID# [id number] , Insurance Phone # [phone]

## 2021-02-03 ENCOUNTER — CONSULT (OUTPATIENT)
Dept: BARIATRICS | Facility: CLINIC | Age: 31
End: 2021-02-03
Payer: COMMERCIAL

## 2021-02-03 VITALS
HEART RATE: 77 BPM | TEMPERATURE: 96.4 F | WEIGHT: 295.4 LBS | SYSTOLIC BLOOD PRESSURE: 132 MMHG | BODY MASS INDEX: 41.35 KG/M2 | DIASTOLIC BLOOD PRESSURE: 82 MMHG | HEIGHT: 71 IN

## 2021-02-03 DIAGNOSIS — R73.03 PREDIABETES: ICD-10-CM

## 2021-02-03 DIAGNOSIS — E78.1 HYPERTRIGLYCERIDEMIA: ICD-10-CM

## 2021-02-03 DIAGNOSIS — F32.A DEPRESSION: ICD-10-CM

## 2021-02-03 DIAGNOSIS — E66.01 MORBID OBESITY (HCC): Primary | ICD-10-CM

## 2021-02-03 DIAGNOSIS — K76.0 HEPATIC STEATOSIS: ICD-10-CM

## 2021-02-03 PROCEDURE — 99244 OFF/OP CNSLTJ NEW/EST MOD 40: CPT | Performed by: PHYSICIAN ASSISTANT

## 2021-02-03 RX ORDER — FLUOXETINE 10 MG/1
10 CAPSULE ORAL EVERY MORNING
COMMUNITY
Start: 2021-01-19 | End: 2022-01-20

## 2021-02-03 RX ORDER — BUPROPION HYDROCHLORIDE 150 MG/1
300 TABLET ORAL DAILY
COMMUNITY
Start: 2021-01-19 | End: 2021-08-10 | Stop reason: ALTCHOICE

## 2021-02-03 NOTE — ASSESSMENT & PLAN NOTE
-Discussed options of HealthyCORE-Intensive Lifestyle Intervention Program, Very Low Calorie Diet-VLCD, Conservative Program, Shana-En-Y Gastric Bypass and Vertical Sleeve Gastrectomy and the role of weight loss medications   -Initial weight loss goal of 5-10% weight loss for improved health  -Screening labs: reviewed  -Patient is interested in pursuing HealthyCORE with partial meal replacement  Samples given  -Started on [address] about 1 month ago by endocrinology and has lost about ~25 lbs  Starting 3 mg dose today  He is tolerating well  He is asking if our office can prescribe further refills for him when needed  Denies personal/family hx MEN2, MTC, pancreatitis  +STOP BANG (4/8):  -reviewed risks of undiagnosed/untreated sleep apnea  -Recommended referral to sleep medicine provider for further evaluation   -Patient declined and would like to see if risks improve with weight loss   Repeat STOP BANG 3-4 months

## 2021-02-03 NOTE — PROGRESS NOTES
Assessment/Plan: Morbid obesity ([facility] )  -Discussed options of HealthyCORE-Intensive Lifestyle Intervention Program, Very Low Calorie Diet-VLCD, Conservative Program, Shana-En-Y Gastric Bypass and Vertical Sleeve Gastrectomy and the role of weight loss medications   -Initial weight loss goal of 5-10% weight loss for improved health  -Screening labs: reviewed  -Patient is interested in pursuing HealthyCORE with partial meal replacement  Samples given  -Started on [address] about 1 month ago by endocrinology and has lost about ~25 lbs  Starting 3 mg dose today  He is tolerating well  He is asking if our office can prescribe further refills for him when needed  Denies personal/family hx MEN2, MTC, pancreatitis  +STOP BANG (4/8):  -reviewed risks of undiagnosed/untreated sleep apnea  -Recommended referral to sleep medicine provider for further evaluation   -Patient declined and would like to see if risks improve with weight loss  Repeat STOP BANG 3-4 months    Prediabetes  -follows with endocrinology  -previously on Ozempic without results  Now on Saxenda  -Most recent HgbA1c WNL    Hypertriglyceridemia  -dietary/lifestyle changes  -On Fenofibrate  -Can improve with weight loss    Hepatic steatosis  -advise minimum 10% TBW loss    Depression  - w/ hx ADD  -On Wellbutrin, Risperdal, prozac and Vyvanse  -followed by Dr Carmen Rowley    Goals:    Food log (ie ) www myfitnesspal com,sparkpeople  com,loseit com,calorieking  com,etc    No sugary beverages  At least 64oz of water daily  Increase physical activity by 10 minutes daily   Gradually increase physical activity to a goal of 5 days per week for 30 minutes of MODERATE intensity PLUS 2 days per week of FULL BODY resistance training    Follow up in approximately 1 week with Non-Surgical Dietician and 16 weeks with PA-C    Diagnoses and all orders for this visit:    Morbid obesity ([facility] )  -     Ambulatory referral to Weight Management    Prediabetes  -     Ambulatory referral to Weight Management    Hypertriglyceridemia  -     Ambulatory referral to Weight Management    Hepatic steatosis    Depression    Other orders  -     buPROPion (WELLBUTRIN XL) 150 mg 24 hr tablet; Take 150 mg by mouth daily  -     FLUoxetine (PROzac) 10 mg capsule; Take 10 mg by mouth every morning          Subjective:   Chief Complaint   Patient presents with    Consult     mwm consult       Patient ID: Sven Navarro  is a 27 y o  male with excess weight/obesity here to pursue weight managment  Past Medical History:   Diagnosis Date    Abnormal liver function tests     ADD (attention deficit disorder)     Asthma     Benign essential hypertension     Depression     Encounter for screening examination for sexually transmitted disease     HIV (human immunodeficiency virus infection) ([facility] )     Hypercholesteremia     Microhematuria     Morbid obesity ([facility] )     Seasonal allergies          HPI:  Obesity/Excess Weight:  Severity: Severe  Onset:  Since childhood   Modifiers: Diet and Exercise and working with personal trainers, weight loss camp over the summer as a teenager  Contributing factors: poor eating habits (large portions), boredom eating, mindless eating, late night eating  Associated symptoms: fatigue, increased joint pain and decreased exercise capacity    Goals:  ~180-200 lbs  Highest 335 lbs    Hydration: water around 1 gallon, 1-2 cups of coffee + 1-2% milk + splenda, 8 oz of orange juice daily  Exercise: cardio 5-6 days per week for 1 hour for past month  Occupation:  [employer]; sedentary  Sleep: ~ 8 hours  ETOH: very rare/infrequent  Smoke: former; denies current      The following portions of the patient's history were reviewed and updated as appropriate: allergies, current medications, past family history, past medical history, past social history, past surgical history and problem list     Review of Systems   Constitutional: Negative for chills and fever     HENT: Negative for sore throat  Respiratory: Negative for cough and shortness of breath  Cardiovascular: Negative for chest pain and palpitations  Gastrointestinal: Negative for abdominal pain, nausea and vomiting  Genitourinary: Negative for dysuria  Musculoskeletal: Negative for arthralgias  Skin: Negative for rash  Neurological: Positive for dizziness (when stands up quickly)  Psychiatric/Behavioral: The patient is nervous/anxious (intermittently)  -reports mood is down - following with psychiatrit, worsended by weight  Denies SI       Objective:    /82 (BP Location: Right arm, Patient Position: Sitting, Cuff Size: Large)   Pulse 77   Temp (!) 96 4 °F (35 8 °C)   Ht 5' 10 5" (1 791 m)   Wt 134 kg (295 lb 6 4 oz)   BMI 41 79 kg/m²     Physical Exam  Vitals signs and nursing note reviewed  Constitutional   General appearance: Abnormal   well developed and morbidly obese  Eyes No conjunctival pallor  Ears, Nose, Mouth, and Throat Oral mucosa moist    Pulmonary   Respiratory effort: No increased work of breathing or signs of respiratory distress  Auscultation of lungs: Clear to auscultation, equal breath sounds bilaterally, no wheezes, no rales, no rhonci  Cardiovascular   Auscultation of heart: Normal rate and rhythm, normal S1 and S2, without murmurs  Examination of extremities for edema and/or varicosities: Normal   no edema  Abdomen   Abdomen: Abnormal   The abdomen was obese  Bowel sounds were normal  The abdomen was soft and nontender     Musculoskeletal   Gait and station: Normal     Psychiatric   Orientation to person, place and time: Normal     Affect: appropriate

## 2021-02-03 NOTE — ASSESSMENT & PLAN NOTE
-follows with endocrinology  -previously on Ozempic without results   Now on Saxenda  -Most recent HgbA1c WNL

## 2021-02-05 NOTE — PROGRESS NOTES
Weight Management Medical Nutrition Assessment  Nigel Daniel [facility] the [facility] with his   Today's weight is 298 2#   Per dietary recall patient consumes excess calories from consuming larger portions at dinner meal and night snacking on sweet and salty carbs  Patient stated he started Saxenda and noticed that his helping with his feeling of fullness at meals  We developed a low calorie meal plan  Patient seen by Medical Provider in past 6 months:  yes  Requested to schedule appointment with Medical Provider: No      Anthropometric Measurements  Start Weight (#): 298 2#  Current Weight (#): n/a  TBW % Change from start weight:n/a  Ideal Body Weight (#):169#  Goal Weight (#):ST# LT#      Weight Loss History  Previous weight loss attempts: Self Created Diets (Portion Control, Healthy Food Choices, etc )  Weight Loss Medications    Food and Nutrition Related History    Food Recall  Breakfast: 2 eggs /small coffee milk 1% milk little / splenda    Snack:skip  Lunch:skip - leftovers or oatmeal/ fruit   Snack:skip   Dinner:larger portions -dine out - high  Salad with chicken / meat- oil/ rodrigo  Snack:graze - salty / sweet  Fruit / Junk food / cheese       Beverages: water and coffee/tea, juice   Volume of beverage intake: 60-80oz water    Weekends: Same  Cravings:cheese / OJ   Trouble area of day:dinner and nightime    Frequency of Eating out: every 2 days  Food restrictions:none reported   Cooking: self   Food Shopping: self    Physical Activity Intake  Activity:eliptical - 60 min / walk during / garden   Frequency:2x week   Physical limitations/barriers to exercise: none reported     Estimated Needs  Energy based (295#)  [employer] Needs:  BMR : 2009 calories    1-2# loss weekly sedentary:  8042-8856 calories            1-2# loss weekly lightly active: 9595-0485 calories   Maintenance calories for sedentary activity level: 2775 calories  Protein:92-116gm     (1 2-1 5g/kg IBW)  Fluid: 90oz     (35mL/kg IBW)    Nutrition Diagnosis  Yes;     Overweight/obesity  related to Excess energy intake as evidenced by  BMI more than normative standard for age and sex (obesity-grade III 36+)       Nutrition Intervention    Nutrition Prescription  Calories:3600-3275 calories on sedentary days and flex to 2200 calories on cardio days  Protein:90-120gm  Fluid:90oz    Meal Plan (Nicola/Pro/Carb)  Breakfast:300/20/30-45  Snack:  Lunch:400/30/30-45  Snack:200  Dinner:500/30-30-45  Snack:200    Nutrition Education:    Healthy Core Manual  Calorie controlled menu  Lean protein food choices  Healthy snack options  Food journaling tips      Nutrition Counseling:  Strategies: meal planning, portion sizes, healthy snack choices, hydration, fiber intake, protein intake, exercise, food journal      Monitoring and Evaluation:  Evaluation criteria:  Energy Intake  Meet protein needs  Maintain adequate hydration  Monitor weekly weight  Meal planning/preparation  Food journal   Decreased portions at mealtimes and snacks  Physical activity     Barriers to learning:none  Readiness to change: action  Comprehension: good  Expected Compliance: good

## 2021-02-07 DIAGNOSIS — E78.1 HYPERTRIGLYCERIDEMIA: ICD-10-CM

## 2021-02-08 ENCOUNTER — OFFICE VISIT (OUTPATIENT)
Dept: BARIATRICS | Facility: CLINIC | Age: 31
End: 2021-02-08

## 2021-02-08 VITALS — BODY MASS INDEX: 41.75 KG/M2 | HEIGHT: 71 IN | WEIGHT: 298.2 LBS

## 2021-02-08 DIAGNOSIS — R73.03 PREDIABETES: ICD-10-CM

## 2021-02-08 DIAGNOSIS — E66.01 MORBID OBESITY (HCC): ICD-10-CM

## 2021-02-08 DIAGNOSIS — R63.5 ABNORMAL WEIGHT GAIN: ICD-10-CM

## 2021-02-08 PROCEDURE — WMPRO12: Performed by: DIETITIAN, REGISTERED

## 2021-02-08 PROCEDURE — RECHECK: Performed by: DIETITIAN, REGISTERED

## 2021-02-08 RX ORDER — FENOFIBRATE 145 MG/1
145 TABLET, COATED ORAL DAILY
Qty: 90 TABLET | Refills: 0 | Status: SHIPPED | OUTPATIENT
Start: 2021-02-08 | End: 2021-04-08 | Stop reason: SDUPTHER

## 2021-02-08 RX ORDER — PEN NEEDLE, DIABETIC 32GX 5/32"
NEEDLE, DISPOSABLE MISCELLANEOUS
Qty: 100 EACH | Refills: 2 | Status: SHIPPED | OUTPATIENT
Start: 2021-02-08 | End: 2021-08-10 | Stop reason: ALTCHOICE

## 2021-02-11 ENCOUNTER — CLINICAL SUPPORT (OUTPATIENT)
Dept: BARIATRICS | Facility: CLINIC | Age: 31
End: 2021-02-11

## 2021-02-11 VITALS — BODY MASS INDEX: 42.18 KG/M2 | HEIGHT: 71 IN

## 2021-02-11 DIAGNOSIS — R63.5 ABNORMAL WEIGHT GAIN: Primary | ICD-10-CM

## 2021-02-11 PROCEDURE — RECHECK

## 2021-02-17 ENCOUNTER — OFFICE VISIT (OUTPATIENT)
Dept: BARIATRICS | Facility: CLINIC | Age: 31
End: 2021-02-17

## 2021-02-17 VITALS — WEIGHT: 284.9 LBS | BODY MASS INDEX: 40.3 KG/M2

## 2021-02-17 DIAGNOSIS — R63.5 ABNORMAL WEIGHT GAIN: Primary | ICD-10-CM

## 2021-02-17 PROCEDURE — RECHECK

## 2021-02-17 NOTE — PROGRESS NOTES
support for [address] presented today for additional support with his weight loss goals  He has been making progress with weight loss since starting the program   He is motivated with the changes he has made so far and plans to continue toward his overall weight loss goals  He has support from his  who does the shopping and Toro Carranza does most of the cooking  Discussed concepts of awareness and mindfulness around his relationship with food

## 2021-02-18 ENCOUNTER — CLINICAL SUPPORT (OUTPATIENT)
Dept: BARIATRICS | Facility: CLINIC | Age: 31
End: 2021-02-18

## 2021-02-18 DIAGNOSIS — R63.5 ABNORMAL WEIGHT GAIN: Primary | ICD-10-CM

## 2021-02-18 PROCEDURE — RECHECK

## 2021-02-19 VITALS — HEIGHT: 71 IN | BODY MASS INDEX: 40.3 KG/M2

## 2021-02-19 NOTE — PROGRESS NOTES
Weight Management Medical Nutrition Assessment  Greta San presented for the 2 week follow-up session with the [address] [place] [address]  Today's self reported home weight is 280#  He has lost 18  2# in the past 14 days  He has made many dietary changes including interval eating, reducing portion sizes at snacks and meals, and food journaling  Patient stated he is averaging 1600 calories daily and feels satisfied at this level  He has not started exercising but plans to start  Patient stated he started Saxenda and noticed that his helping with his feeling of fullness at meals  We reviewed his low calorie meal plan       Patient seen by Medical Provider in past 6 months:  yes  Requested to schedule appointment with Medical Provider: No        Anthropometric Measurements  Start Weight (#): 298 2#  Current Weight (#): 280# ( self-reported home weight)  TBW % Change from start weight:6 1%  Ideal Body Weight (#):169#  Goal Weight (#):ST# LT#        Weight Loss History  Previous weight loss attempts: Self Created Diets (Portion Control, Healthy Food Choices, etc )  Weight Loss Medications     Food and Nutrition Related History     Food Recall  Calories / Protein/ Carbs   Breakfast:egg/egg white / cheese/ english muffing  Trop Muffin  Snack:skip  Lunch:turkey and cheese on low carb wrap  Snack:Kelloggs protein bar  Dinner:Lean protein / Ardyth Konig / carb - smaller portions  Snack:measured snack - fruit/ salty carb        Beverages: water and coffee/tea, Trop 50 -measured  Volume of beverage intake: 60-80oz water     Weekends: Same  Cravings:cheese / OJ   Trouble area of day:dinner and nightime     Frequency of Eating out: every 2 days  Food restrictions:none reported   Cooking: self   Food Shopping: self     Physical Activity Intake  Activity:none - has eliptical   Frequency:infrequent  Physical limitations/barriers to exercise: none reported      Estimated Needs  Energy based (295#)  [employer] Needs:  BMR : 2244calories    1-2# loss weekly sedentary: 6035-1313  calories            1-2# loss weekly lightly active:  6516-0728 calories   Maintenance calories for sedentary activity level: 2693 calories  Protein:92-116gm     (1 2-1 5g/kg IBW)  Fluid: 90oz     (35mL/kg IBW)     Nutrition Diagnosis  Yes;     Overweight/obesity  related to Excess energy intake as evidenced by  BMI more than normative standard for age and sex (obesity-grade II 39 61)     Nutrition Intervention     Nutrition Prescription  Calories:5057-7876 calories on sedentary days and flex to 2200 calories on cardio days  Protein:90-120gm  Fluid:90oz     Meal Plan (Nicola/Pro/Carb)  Breakfast:300/20/30-45  Snack:  Lunch:400/30/30-45  Snack:200  Dinner:500/30-30-45  Snack:200     Nutrition Education:    Healthy Core Manual  Calorie controlled menu  Lean protein food choices  Healthy snack options  Food journaling tips        Nutrition Counseling:  Strategies: meal planning, portion sizes, healthy snack choices, hydration, fiber intake, protein intake, exercise, food journal        Monitoring and Evaluation:  Evaluation criteria:  Energy Intake  Meet protein needs  Maintain adequate hydration  Monitor weekly weight  Meal planning/preparation  Food journal   Decreased portions at mealtimes and snacks  Physical activity      Barriers to learning:none  Readiness to change: action  Comprehension: good  Expected Compliance: good

## 2021-02-22 ENCOUNTER — OFFICE VISIT (OUTPATIENT)
Dept: BARIATRICS | Facility: CLINIC | Age: 31
End: 2021-02-22

## 2021-02-22 VITALS — HEIGHT: 71 IN | WEIGHT: 280 LBS | BODY MASS INDEX: 39.2 KG/M2

## 2021-02-22 DIAGNOSIS — R63.5 ABNORMAL WEIGHT GAIN: Primary | ICD-10-CM

## 2021-02-22 PROCEDURE — RECHECK: Performed by: DIETITIAN, REGISTERED

## 2021-02-25 ENCOUNTER — CLINICAL SUPPORT (OUTPATIENT)
Dept: BARIATRICS | Facility: CLINIC | Age: 31
End: 2021-02-25

## 2021-02-25 VITALS — HEIGHT: 71 IN | BODY MASS INDEX: 39.61 KG/M2

## 2021-02-25 DIAGNOSIS — R63.5 ABNORMAL WEIGHT GAIN: Primary | ICD-10-CM

## 2021-02-25 PROCEDURE — RECHECK

## 2021-02-26 ENCOUNTER — OFFICE VISIT (OUTPATIENT)
Dept: BARIATRICS | Facility: CLINIC | Age: 31
End: 2021-02-26

## 2021-02-26 VITALS — HEIGHT: 71 IN | BODY MASS INDEX: 39.15 KG/M2 | WEIGHT: 279.66 LBS

## 2021-02-26 DIAGNOSIS — R63.5 ABNORMAL WEIGHT GAIN: Primary | ICD-10-CM

## 2021-02-26 PROCEDURE — RECHECK: Performed by: DIETITIAN, REGISTERED

## 2021-02-28 DIAGNOSIS — N52.9 ERECTILE DYSFUNCTION, UNSPECIFIED ERECTILE DYSFUNCTION TYPE: ICD-10-CM

## 2021-03-01 ENCOUNTER — TELEPHONE (OUTPATIENT)
Dept: BARIATRICS | Facility: CLINIC | Age: 31
End: 2021-03-01

## 2021-03-01 RX ORDER — SILDENAFIL 100 MG/1
TABLET, FILM COATED ORAL
Qty: 90 TABLET | Refills: 0 | Status: SHIPPED | OUTPATIENT
Start: 2021-03-01 | End: 2021-08-04 | Stop reason: SDUPTHER

## 2021-03-01 NOTE — TELEPHONE ENCOUNTER
The patient has an upcoming office visit scheduled for 6/1/21 with Ricardo Montgomery in the [facility] [place] location but will run out of medication until then    Request for same, 90 count supply with NO refills was queued and forwarded to the Advanced Practitioner covering the [facility] [place] [place] location for approval

## 2021-03-01 NOTE — TELEPHONE ENCOUNTER
Called patient to renew month 2 of Healthy Core program  Left Message  Classes scheduled  Needs dietician appointment and Payment

## 2021-03-04 ENCOUNTER — CLINICAL SUPPORT (OUTPATIENT)
Dept: BARIATRICS | Facility: CLINIC | Age: 31
End: 2021-03-04

## 2021-03-04 DIAGNOSIS — R63.5 ABNORMAL WEIGHT GAIN: Primary | ICD-10-CM

## 2021-03-04 PROCEDURE — RECHECK

## 2021-03-05 VITALS — BODY MASS INDEX: 39.56 KG/M2 | HEIGHT: 71 IN

## 2021-03-10 DIAGNOSIS — Z23 ENCOUNTER FOR IMMUNIZATION: ICD-10-CM

## 2021-03-11 ENCOUNTER — CLINICAL SUPPORT (OUTPATIENT)
Dept: BARIATRICS | Facility: CLINIC | Age: 31
End: 2021-03-11

## 2021-03-11 DIAGNOSIS — R63.5 ABNORMAL WEIGHT GAIN: Primary | ICD-10-CM

## 2021-03-11 PROCEDURE — RECHECK

## 2021-03-12 VITALS — HEIGHT: 71 IN | BODY MASS INDEX: 39.56 KG/M2

## 2021-03-18 ENCOUNTER — CLINICAL SUPPORT (OUTPATIENT)
Dept: BARIATRICS | Facility: CLINIC | Age: 31
End: 2021-03-18

## 2021-03-18 VITALS — HEIGHT: 71 IN | BODY MASS INDEX: 39.56 KG/M2

## 2021-03-18 DIAGNOSIS — R63.5 ABNORMAL WEIGHT GAIN: Primary | ICD-10-CM

## 2021-03-18 PROCEDURE — RECHECK

## 2021-03-25 ENCOUNTER — CLINICAL SUPPORT (OUTPATIENT)
Dept: BARIATRICS | Facility: CLINIC | Age: 31
End: 2021-03-25

## 2021-03-25 VITALS — BODY MASS INDEX: 40.13 KG/M2 | HEIGHT: 70 IN

## 2021-03-25 DIAGNOSIS — R63.5 ABNORMAL WEIGHT GAIN: Primary | ICD-10-CM

## 2021-03-25 PROCEDURE — RECHECK

## 2021-03-26 ENCOUNTER — OFFICE VISIT (OUTPATIENT)
Dept: BARIATRICS | Facility: CLINIC | Age: 31
End: 2021-03-26

## 2021-03-26 VITALS — WEIGHT: 267.5 LBS | BODY MASS INDEX: 38.38 KG/M2

## 2021-03-26 DIAGNOSIS — R63.5 ABNORMAL WEIGHT GAIN: Primary | ICD-10-CM

## 2021-03-26 PROCEDURE — RECHECK: Performed by: DIETITIAN, REGISTERED

## 2021-03-26 NOTE — PROGRESS NOTES
Weight Management Medical Nutrition Assessment  Zack presented for the 2 month follow-up session with the Healthy [facility]ORE Program  Today's weight is 265 5#  He has lost 11 5# in the past 4 weeks and overall has lost 30 7 In the past 7 weeks  He has made many dietary changes including interval eating, reducing portion sizes at snacks and meals, and food journaling  Patient stated he is averaging 1600 calories daily and feels satisfied at this level  Patient stated he started Saxenda and noticed that his helping with his feeling of fullness at meals  We reviewed his low calorie meal plan       Patient seen by Medical Provider in past 6 months:  yes  Requested to schedule appointment with Medical Provider: No        Anthropometric Measurements  Start Weight (#): 298 2#  Current Weight (#): 267 5#  TBW % Change from start weight:10%  Ideal Body Weight (#):169#  Goal Weight (#):ST# LT#        Weight Loss History  Previous weight loss attempts: Self Created Diets (Portion Control, Healthy Food Choices, etc )  Weight Loss Medications     Food and Nutrition Related History     Food Recall  Calories / Protein/ Carbs   Breakfast:egg/egg white / cheese/ english muffin or banana (Trop 50)  Snack:skip  Lunch:LS Fat free turkey and cheese/ lettuce/ low fat domingo low carb wrap  Snack:Kel[employer]gs protein bar  Dinner:Lean protein / [employer]ager Shakila / carb - smaller portions/ measured   Snack:measured snack - measured        Beverages: water and coffee/tea, Trop 50 -measured  Volume of beverage intake: 60-80oz water     Weekends: Same  Cravings: none reported    Trouble area of day:dinner and nightime     Frequency of Eating out: every 2 days  Food restrictions:none reported   Cooking: self   Food Shopping: self     Physical Activity Intake  Activity: Eliptical - 45min   Frequency:2-3x  Physical limitations/barriers to exercise: none reported      Estimated Needs  Energy based (295#)  [employer] Needs:  BMR :  2188calories    1-2# loss weekly sedentary: 1975-7876  calories            1-2# loss weekly lightly active:  1473-0420 calories   Maintenance calories for sedentary activity level: 2625 calories  Protein:92-116gm     (1 2-1 5g/kg IBW)  Fluid: 90oz     (35mL/kg IBW)     Nutrition Diagnosis  Yes;    Overweight/obesity  related to Excess energy intake as evidenced by  BMI more than normative standard for age and sex (obesity-grade II 39 61)     Nutrition Intervention     Nutrition Prescription  Calories:1344-6427 calories on sedentary days and flex to 2000 calories on cardio days  Protein:90-120gm  Fluid:90oz     Meal Plan (Nicola/Pro/Carb)  Breakfast:300/20/30-45  Snack:  Lunch:400/30/30-45  Snack:200  Dinner:500/30-30-45  Snack:200     Nutrition Education:    Healthy Core Manual  Calorie controlled menu  Lean protein food choices  Healthy snack options  Food journaling tips        Nutrition Counseling:  Strategies: meal planning, portion sizes, healthy snack choices, hydration, fiber intake, protein intake, exercise, food journal        Monitoring and Evaluation:  Evaluation criteria:  Energy Intake  Meet protein needs  Maintain adequate hydration  Monitor weekly weight  Meal planning/preparation  Food journal   Decreased portions at mealtimes and snacks  Physical activity      Barriers to learning:none  Readiness to change: action  Comprehension: good  Expected Compliance: good

## 2021-04-01 ENCOUNTER — CLINICAL SUPPORT (OUTPATIENT)
Dept: BARIATRICS | Facility: CLINIC | Age: 31
End: 2021-04-01

## 2021-04-01 VITALS — HEIGHT: 70 IN | BODY MASS INDEX: 38.38 KG/M2

## 2021-04-01 DIAGNOSIS — R63.5 ABNORMAL WEIGHT GAIN: Primary | ICD-10-CM

## 2021-04-01 PROCEDURE — RECHECK

## 2021-04-08 ENCOUNTER — CLINICAL SUPPORT (OUTPATIENT)
Dept: BARIATRICS | Facility: CLINIC | Age: 31
End: 2021-04-08

## 2021-04-08 VITALS — HEIGHT: 70 IN | BODY MASS INDEX: 38.38 KG/M2

## 2021-04-08 DIAGNOSIS — E78.1 HYPERTRIGLYCERIDEMIA: ICD-10-CM

## 2021-04-08 DIAGNOSIS — R63.5 ABNORMAL WEIGHT GAIN: Primary | ICD-10-CM

## 2021-04-08 PROCEDURE — RECHECK

## 2021-04-08 RX ORDER — FENOFIBRATE 145 MG/1
145 TABLET, COATED ORAL DAILY
Qty: 90 TABLET | Refills: 0 | Status: SHIPPED | OUTPATIENT
Start: 2021-04-08 | End: 2021-06-28 | Stop reason: SDUPTHER

## 2021-04-15 ENCOUNTER — CLINICAL SUPPORT (OUTPATIENT)
Dept: BARIATRICS | Facility: CLINIC | Age: 31
End: 2021-04-15

## 2021-04-15 VITALS — HEIGHT: 70 IN | BODY MASS INDEX: 38.38 KG/M2

## 2021-04-15 DIAGNOSIS — R63.5 ABNORMAL WEIGHT GAIN: Primary | ICD-10-CM

## 2021-04-15 PROCEDURE — RECHECK

## 2021-04-22 ENCOUNTER — CLINICAL SUPPORT (OUTPATIENT)
Dept: BARIATRICS | Facility: CLINIC | Age: 31
End: 2021-04-22

## 2021-04-22 VITALS — BODY MASS INDEX: 38.38 KG/M2 | HEIGHT: 70 IN

## 2021-04-22 DIAGNOSIS — R63.5 ABNORMAL WEIGHT GAIN: Primary | ICD-10-CM

## 2021-04-22 PROCEDURE — RECHECK

## 2021-04-26 ENCOUNTER — TELEPHONE (OUTPATIENT)
Dept: ENDOCRINOLOGY | Facility: CLINIC | Age: 31
End: 2021-04-26

## 2021-04-26 NOTE — TELEPHONE ENCOUNTER
Prior Authorization sent to Pt's plan via Cover My meds   Christiano Ramesh (Key: [id number])    Your information has been submitted to [address] [place] [address]  [employer] [place] will review the request and notify you of the determination decision directly, typically within 72 hours of receiving all information  You will also receive your request decision electronically  To check for an update later, open this request again from your dashboard  If [address] [place] [address] has not responded within the specified timeframe or if you have any questions about your [place] submission, contact [address] [place] [address] directly at [phone]

## 2021-04-27 NOTE — PROGRESS NOTES
Weight Management Medical Nutrition Assessment  Zack presented for the 3 month follow-up session with the [facility]  Today's weight is 259 2#  He has lost 8 3# in the past 4 weeks and overall has lost 39 2# in the past  11weeks  Reevue indirect calorimter revealed REE is fast (+20%)       compared to the predictive normal for someone her same age, height, and gender  He has made many dietary changes including interval eating, reducing portion sizes at snacks and meals, and food journaling  Patient stated he is averaging 1600 calories daily and feels satisfied at this level  Patient stated he started Saxenda and noticed that his helping with his feeling of fullness at meals  We reviewed his low calorie meal plan  Reviewed importance of increasing his calorie level on exercise days if his weight loss stalls at lower level   Discussed upcoming vacations      Patient seen by Medical Provider in past 6 months:  yes  Requested to schedule appointment with Medical Provider: No        Anthropometric Measurements  Start Weight (#): 298 2#  Current Weight (#): 259 2#  TBW % Change from start weight:13%  Ideal Body Weight (#):169#  Goal Weight (#):ST# LT#        Weight Loss History  Previous weight loss attempts: Self Created Diets (Portion Control, Healthy Food Choices, etc )  Weight Loss Medications     Food and Nutrition Related History     Food Recall  Calories / Protein/ Carbs   Breakfast:egg/egg white / cheese/ english muffin or banana (Trop 50)  Snack:skip  Lunch:LS Fat free turkey and cheese/ lettuce/ low fat domingo low carb wrap  Snack:Kelloggs protein bar skip   Dinner:Lean protein / veggies / carb - smaller portions/ measured   Snack:measured snack - measured        Beverages: water and coffee/tea, Trop 50 -measured  Volume of beverage intake: 80oz water     Weekends: Same  Cravings: none reported    Trouble area of day:dinner and nightime     Frequency of Eating out: every 2 days  Food restrictions:none reported   Cooking: self   Food Shopping: self     Physical Activity Intake  Activity: Eliptical - 45min   Frequency:5x  Physical limitations/barriers to exercise: none reported      Estimated Needs  Energy based (295#)  [employer] Needs:  BMR : 8724 calories    1-2# loss weekly sedentary: 4062-1728  calories            1-2# loss weekly lightly active: 0574-3731 calories   Maintenance calories for sedentary activity level: 2574 calories  Reevue Indirect Calorimeter REE: 0388 (20% higher)           Weight loss zone with exercise: 4685-3018 calories            Weight loss without exercise: 1602-1892 calories               Protein:92-116gm     (1 2-1 5g/kg IBW)  Fluid: 90oz     (35mL/kg IBW)     Nutrition Diagnosis  Yes;    Overweight/obesity  related to Excess energy intake as evidenced by  BMI more than normative standard for age and sex (obesity-grade II 42 5)     Nutrition Intervention     Nutrition Prescription  Calories:2695-9778 calories on sedentary days and flex to 2000 calories on cardio days  Protein:90-120gm  Fluid:90oz     Meal Plan (Nicola/Pro/Carb)  Breakfast:300/20/30-45  Snack:  Lunch:400/30/30-45  Snack:200  Dinner:500/30-30-45  Snack:200     Nutrition Education:    Healthy Core Manual  Calorie controlled menu  Lean protein food choices  Healthy snack options  Food journaling tips        Nutrition Counseling:  Strategies: meal planning, portion sizes, healthy snack choices, hydration, fiber intake, protein intake, exercise, food journal        Monitoring and Evaluation:  Evaluation criteria:  Energy Intake  Meet protein needs  Maintain adequate hydration  Monitor weekly weight  Meal planning/preparation  Food journal   Decreased portions at mealtimes and snacks  Physical activity      Barriers to learning:none  Readiness to change: action  Comprehension: good  Expected Compliance: good

## 2021-04-28 ENCOUNTER — OFFICE VISIT (OUTPATIENT)
Dept: BARIATRICS | Facility: CLINIC | Age: 31
End: 2021-04-28

## 2021-04-28 VITALS — HEIGHT: 71 IN | BODY MASS INDEX: 36.28 KG/M2 | WEIGHT: 259.15 LBS

## 2021-04-28 DIAGNOSIS — R63.5 ABNORMAL WEIGHT GAIN: Primary | ICD-10-CM

## 2021-04-28 PROCEDURE — RECHECK: Performed by: DIETITIAN, REGISTERED

## 2021-04-29 ENCOUNTER — CLINICAL SUPPORT (OUTPATIENT)
Dept: BARIATRICS | Facility: CLINIC | Age: 31
End: 2021-04-29

## 2021-04-29 VITALS — HEIGHT: 70 IN | BODY MASS INDEX: 37.18 KG/M2

## 2021-04-29 DIAGNOSIS — R63.5 ABNORMAL WEIGHT GAIN: Primary | ICD-10-CM

## 2021-04-29 PROCEDURE — RECHECK

## 2021-05-23 NOTE — PROGRESS NOTES
1  Coccydynia  XR sacrum and coccyx     Orders Placed This Encounter   Procedures    XR sacrum and coccyx        Imaging Studies (I personally reviewed images in PACS and report):    X-ray of the sacrum and coccyx performed today does not reveal any acute injury  Partial fusion of the sacrococcygeal junction noted    ASSESSMENT/PLAN:   I explained my current clinical findings and reviewed radiological findings with Mr Shahram Longo  His symptoms are likely secondary coccydynia  In this regard I have advised him to use a sacral cushion and avoid prolonged sitting on a hard surface  Usually, symptoms would improve with these measures  If symptoms worsen he may consider seeing a pain management doctor for a local injection  No follow-ups on file       _____________________________________________________________  CHIEF COMPLAINT:  New patient "tailbone" pain    HPI:  Judith Srivastava is a 32 y o  male with a history of morbid obesity, HIV, depression, HTN, ADD who presents as a new patient for initial [facility] evaluation of "tailbone" pain  Visit 5/23/2021 :  Today,  The patient reports that his symptoms started nearly 15 years ago when he fell off a horse  Ever since he has noticed some intermittent discomfort of his tailbone which worsens with prolonged sitting activity on a hard surface  It release with standing up  Pain intensity is mild to moderate  He denies any difficulty opening his bowel  Denies any lower extremity tingling numbness or new onset weakness  Review of Systems   Constitutional: Negative  HENT: Negative  Eyes: Negative  Respiratory: Negative  Cardiovascular: Negative  Gastrointestinal: Negative  Endocrine: Negative  Genitourinary: Negative  Skin: Negative  Allergic/Immunologic: Negative  Neurological: Negative  Psychiatric/Behavioral: Negative          Following history reviewed and update:    Past Medical History:   Diagnosis Date    Abnormal liver function tests     ADD (attention deficit disorder)     Asthma     Benign essential hypertension     Depression     Encounter for screening examination for sexually transmitted disease     HIV [facility]human immunodeficiency virus infection) ([facility] )     Hypercholesteremia     Microhematuria     Morbid obesity ([facility] )     Seasonal allergies      Past Surgical History:   Procedure Laterality Date    TONSILLECTOMY       Social History   Social History     Substance and Sexual Activity   Alcohol Use Yes    Frequency: Monthly or less    Drinks per session: 1 or 2    Binge frequency: Never    Comment: 1 drink per month     Social History     Substance and Sexual Activity   Drug Use Yes    Frequency: 7 0 times per week    Types: Marijuana     Social History     Tobacco Use   Smoking Status Former Smoker    Packs/day: 0 50    Types: Cigarettes    Quit date: 2019    Years since quittin 8   Smokeless Tobacco Never Used     Family History   Problem Relation Age of Onset    Hyperlipidemia Mother     Diabetes Father     Hyperlipidemia Father     Hypertension Father     Heart disease Father     Diabetes Paternal Aunt     Diabetes Paternal Uncle     Cancer Maternal Grandfather         prostate    Diabetes Paternal Grandmother     Heart disease Paternal Grandmother      No Known Allergies       Physical Exam  Vitals signs and nursing note reviewed  Constitutional:       Appearance: He is well-developed  HENT:      Head: Normocephalic and atraumatic  Eyes:      Conjunctiva/sclera: Conjunctivae normal    Cardiovascular:      Rate and Rhythm: Normal rate and regular rhythm  Pulmonary:      Effort: Pulmonary effort is normal  No respiratory distress  Skin:     General: Skin is warm  Findings: No erythema  Neurological:      Mental Status: He is alert and oriented to person, place, and time  Psychiatric:         Behavior: Behavior normal          Thought Content:  Thought content normal  Judgment: Judgment normal            Ortho Exam      Lumbosacral spine exam:     no deformity  No midline lumbar spine tenderness or L5-S1 tenderness  There is tenderness to palpation at the sacrococcygeal junction  There is no paraspinal tenderness or spasm  SLR is negative bilaterally  No focal neurological deficits of bilateral lower extremities  Portions of the record may have been created with voice recognition software  Occasional wrong word or "sound alike" substitutions may have occurred due to the inherent limitations of voice recognition software  Please review the chart carefully and recognize, using context, where substitutions/typographical errors may have occurred

## 2021-05-25 ENCOUNTER — OFFICE VISIT (OUTPATIENT)
Dept: OBGYN CLINIC | Facility: OTHER | Age: 31
End: 2021-05-25
Payer: COMMERCIAL

## 2021-05-25 ENCOUNTER — APPOINTMENT (OUTPATIENT)
Dept: RADIOLOGY | Facility: OTHER | Age: 31
End: 2021-05-25
Payer: COMMERCIAL

## 2021-05-25 VITALS
BODY MASS INDEX: 38.94 KG/M2 | SYSTOLIC BLOOD PRESSURE: 126 MMHG | WEIGHT: 272 LBS | HEART RATE: 60 BPM | DIASTOLIC BLOOD PRESSURE: 75 MMHG | HEIGHT: 70 IN

## 2021-05-25 DIAGNOSIS — M53.3 COCCYDYNIA: Primary | ICD-10-CM

## 2021-05-25 DIAGNOSIS — M53.3 COCCYDYNIA: ICD-10-CM

## 2021-05-25 PROCEDURE — 99203 OFFICE O/P NEW LOW 30 MIN: CPT | Performed by: ORTHOPAEDIC SURGERY

## 2021-05-25 PROCEDURE — 72220 X-RAY EXAM SACRUM TAILBONE: CPT

## 2021-05-25 NOTE — PROGRESS NOTES
Weight Management Medical Nutrition Assessment  Zack presented for the  Today's weight is 267 6#  He has had a 8 4# weight gain  in the past 4 weeks and overall has lost 30 6# in the past 14 weeks  He stated he has not been food logging and knows his calorie level has been higher related to increased dining out lately  He states he is choosing calorically dense dining out meals and will often have ice cream at night  He stated the earlier part of his day is very controlled and he has been exercising  Patient stated he is bored with dinner meals he was preparing  Discussed alternate dinner meals and prepared meal service  Recommend pre log dinner meal and night snack  We reviewed alternated ice cream options   Discussed upcoming social engagements and vacations       Patient seen by Medical Provider in past 6 months:  yes  Requested to schedule appointment with Medical Provider: No        Anthropometric Measurements  Start Weight (#): 298 2#  (21)  Current Weight (#): 267 6#  TBW % Change from start weight:10 3%  Ideal Body Weight (#):169#  Goal Weight (#):ST# LT#        Weight Loss History  Previous weight loss attempts: Self Created Diets (Portion Control, Healthy Food Choices, etc )  Weight Loss Medications     Food and Nutrition Related History     Food Recall  Calories / Protein/ Carbs   Breakfast:egg/egg white / cheese/ english muffin or banana (Trop 50)  Snack:skip  Lunch:LS Fat free turkey and cheese/ lettuce/ low fat domingo low carb wrap  Snack:Kelloggs protein bar skip   Dinner:Dining out more- take out   [place] / Carbs/ Burgers and [place]  Snack:ice cream        Beverages: water and coffee/tea, Trop 50 -measured  Volume of beverage intake: 80oz water     Weekends: Same  Cravings: none reported    Trouble area of day:dinner and nightime     Frequency of Eating out: every 2 days  Food restrictions:none reported   Cooking: self   Food Shopping: self     Physical Activity Intake  Activity: Eliptical - 45min   Frequency:4x  Physical limitations/barriers to exercise: none reported      Estimated Needs  Energy based (295#)  [place] Energy Needs:  BMR : 2259 calories    1-2# loss weekly sedentary: 0904-9357  calories            1-2# loss weekly lightly active: 4489-8758 calories   Maintenance calories for sedentary activity level: 2610 calories  [place] Indirect Calorimeter REE: 8869 (20% higher)           Weight loss zone with exercise: 7233-0038 calories            Weight loss without exercise: 1985-2555 calories               Protein:92-116gm     (1 2-1 5g/kg IBW)  Fluid: 90oz     (35mL/kg IBW)     Nutrition Diagnosis  Yes;    Overweight/obesity  related to Excess energy intake as evidenced by  BMI more than normative standard for age and sex (obesity-grade Davina Christy)     Nutrition Intervention     Nutrition Prescription  Calories:7959-0521 calories on sedentary days and flex to 2000 calories on cardio days  Protein:90-120gm  Fluid:90oz     Meal Plan (Nicola/Pro/Carb)  Breakfast:300/20/30-45  Snack:  Lunch:400/30/30-45  Snack:200  Dinner:500/30-30-45  Snack:200     Nutrition Education:    Healthy Core Manual  Calorie controlled menu  Lean protein food choices  Healthy snack options  Food journaling tips        Nutrition Counseling:  Strategies: meal planning, portion sizes, healthy snack choices, hydration, fiber intake, protein intake, exercise, food journal        Monitoring and Evaluation:  Evaluation criteria:  Energy Intake  Meet protein needs  Maintain adequate hydration  Monitor weekly weight  Meal planning/preparation  Food journal   Decreased portions at mealtimes and snacks  Physical activity      Barriers to learning:none  Readiness to change: action  Comprehension: good  Expected Compliance: good

## 2021-05-26 ENCOUNTER — OFFICE VISIT (OUTPATIENT)
Dept: BARIATRICS | Facility: CLINIC | Age: 31
End: 2021-05-26

## 2021-05-26 DIAGNOSIS — R63.5 ABNORMAL WEIGHT GAIN: ICD-10-CM

## 2021-05-26 PROCEDURE — DB3PK: Performed by: DIETITIAN, REGISTERED

## 2021-05-26 PROCEDURE — RECHECK: Performed by: DIETITIAN, REGISTERED

## 2021-06-02 ENCOUNTER — OFFICE VISIT (OUTPATIENT)
Dept: BARIATRICS | Facility: CLINIC | Age: 31
End: 2021-06-02
Payer: COMMERCIAL

## 2021-06-02 VITALS
WEIGHT: 265.9 LBS | HEART RATE: 68 BPM | BODY MASS INDEX: 37.23 KG/M2 | HEIGHT: 71 IN | TEMPERATURE: 97.5 F | SYSTOLIC BLOOD PRESSURE: 124 MMHG | DIASTOLIC BLOOD PRESSURE: 68 MMHG | RESPIRATION RATE: 16 BRPM

## 2021-06-02 DIAGNOSIS — E78.1 HYPERTRIGLYCERIDEMIA: ICD-10-CM

## 2021-06-02 DIAGNOSIS — F32.A DEPRESSION: ICD-10-CM

## 2021-06-02 DIAGNOSIS — E66.9 OBESITY, CLASS II, BMI 35-39.9: Primary | ICD-10-CM

## 2021-06-02 DIAGNOSIS — K76.0 HEPATIC STEATOSIS: ICD-10-CM

## 2021-06-02 DIAGNOSIS — R73.03 PREDIABETES: ICD-10-CM

## 2021-06-02 LAB — HBA1C MFR BLD HPLC: 5.2 %

## 2021-06-02 PROCEDURE — 99214 OFFICE O/P EST MOD 30 MIN: CPT | Performed by: PHYSICIAN ASSISTANT

## 2021-06-02 NOTE — ASSESSMENT & PLAN NOTE
-Patient is pursuing [facility] Lifestyle Intervention Program  -Initial weight loss goal of 5-10% weight loss for improved health  -Screening labs    Initial: 275 9 lbs   Current:265 9 lbs   Change: -10 lbs   Goal:180-200 lbs     Continue with RD bundles  Continue saxenda  Get back to logging and the gym

## 2021-06-02 NOTE — ASSESSMENT & PLAN NOTE
Taking fenofibrate  -should improve with weight loss, dietary, and lifestyle changes  -continue management with prescribing provider

## 2021-06-02 NOTE — PROGRESS NOTES
Assessment/Plan:    Obesity, Class II, BMI 35-39 9  -Patient is pursuing [facility]estyle Intervention Program  -Initial weight loss goal of 5-10% weight loss for improved health  -Screening labs    Initial: 275 9 lbs   Current:265 9 lbs   Change: -10 lbs   Goal:180-200 lbs     Continue with RD bundles  Continue saxenda  Get back to logging and the gym     Hypertriglyceridemia  Taking fenofibrate  -should improve with weight loss, dietary, and lifestyle changes  -continue management with prescribing provider      Prediabetes  -follows with endocrinology  -previously on Ozempic without results  Now on Saxenda  -Most recent HgbA1c WNL    Hepatic steatosis  Confirmed via ultrasound on 5/22/20    Depression  - w/ hx ADD  -On Wellbutrin, Risperdal, prozac and Vyvanse  -followed by Dr Bertha Gutierrez        Follow up in approximately 2 months with Non-Surgical Physician/Advanced Practitioner  Diagnoses and all orders for this visit:    Obesity, Class II, BMI 35-39 9    Hypertriglyceridemia    Prediabetes    Hepatic steatosis    Depression          Subjective:   Chief Complaint   Patient presents with    Follow-up     16 week follow up         Patient ID: Vickie Chavira  is a 32 y o  male with excess weight/obesity here to pursue weight managment  Patient is pursuing [facility]vention Program      HPI  Completed HC and liked the program  Eunice it went well overall  Food logging:was logging but not the last few weeks   Fruit/Vegetable servings: 2-3 servings   Exercise:working out 5 days a week mostly cardio for 60 minutes until he got a cold last week   Hydration: 64 oz of water, coffee with splenda and 1% milk, 1 cup of orange juice daily and occasional diet iced tea    Saxenda: still taking it  Started it with endo and was 318  Helps appetite  Denies any symptoms       B: 1 egg with 1 slice of cheese with banana or english muffin with I can't believe its not butter with juice   S: N/a  L: turkey wrap with cheese with domingo and mustard with occasionally lettuce   S: granola bar   D: 2 slices of pizza or salad with protein and veggies  S: fruit     Colonoscopy-Not applicable    The following portions of the patient's history were reviewed and updated as appropriate: allergies, current medications, past family history, past medical history, past social history, past surgical history and problem list     Review of Systems   HENT: Negative for sore throat  Respiratory: Negative for cough and shortness of breath  Cardiovascular: Negative for chest pain and palpitations  Gastrointestinal: Negative for abdominal pain, constipation, diarrhea, nausea and vomiting  Denies GERD   Skin: Negative for rash  Psychiatric/Behavioral: Negative for suicidal ideas  Denies depression and anxiety       Objective:    /68 (BP Location: Left arm, Patient Position: Sitting, Cuff Size: Adult)   Pulse 68   Temp 97 5 °F (36 4 °C) (Tympanic)   Resp 16   Ht 5' 10 5" (1 791 m)   Wt 121 kg (265 lb 14 4 oz)   BMI 37 61 kg/m²      Physical Exam  Vitals signs and nursing note reviewed  Constitutional   General appearance: Abnormal   well developed and morbidly obese  Eyes No conjunctival pallor  Pulmonary   Respiratory effort: No increased work of breathing or signs of respiratory distress  Abdomen   Abdomen: Abnormal   The abdomen was obese     Musculoskeletal   Gait and station: Normal     Psychiatric   Orientation to person, place and time: Normal     Affect: appropriate

## 2021-06-03 LAB
25(OH)D3+25(OH)D2 SERPL-MCNC: 38.3 NG/ML (ref 30–100)
ALBUMIN SERPL-MCNC: 4.8 G/DL (ref 4–5)
ALBUMIN SERPL-MCNC: 4.9 G/DL (ref 4–5)
ALBUMIN/GLOB SERPL: 1.6 {RATIO} (ref 1.2–2.2)
ALBUMIN/GLOB SERPL: 1.7 {RATIO} (ref 1.2–2.2)
ALP SERPL-CCNC: 42 IU/L (ref 48–121)
ALP SERPL-CCNC: 42 IU/L (ref 48–121)
ALT SERPL-CCNC: 18 IU/L (ref 0–44)
ALT SERPL-CCNC: 20 IU/L (ref 0–44)
APPEARANCE UR: CLEAR
AST SERPL-CCNC: 17 IU/L (ref 0–40)
AST SERPL-CCNC: 18 IU/L (ref 0–40)
BACTERIA URNS QL MICRO: NORMAL
BASOPHILS # BLD AUTO: 0.1 X10E3/UL (ref 0–0.2)
BASOPHILS NFR BLD AUTO: 1 %
BILIRUB SERPL-MCNC: 0.4 MG/DL (ref 0–1.2)
BILIRUB SERPL-MCNC: 0.4 MG/DL (ref 0–1.2)
BILIRUB UR QL STRIP: NEGATIVE
BUN SERPL-MCNC: 17 MG/DL (ref 6–20)
BUN SERPL-MCNC: 17 MG/DL (ref 6–20)
BUN/CREAT SERPL: 16 (ref 9–20)
BUN/CREAT SERPL: 16 (ref 9–20)
CALCIUM SERPL-MCNC: 10.1 MG/DL (ref 8.7–10.2)
CALCIUM SERPL-MCNC: 10.1 MG/DL (ref 8.7–10.2)
CD3+CD4+ CELLS # BLD: 1276 /UL (ref 359–1519)
CD3+CD4+ CELLS NFR BLD: 44 % (ref 30.8–58.5)
CHLORIDE SERPL-SCNC: 101 MMOL/L (ref 96–106)
CHLORIDE SERPL-SCNC: 102 MMOL/L (ref 96–106)
CHOLEST SERPL-MCNC: 211 MG/DL (ref 100–199)
CO2 SERPL-SCNC: 21 MMOL/L (ref 20–29)
CO2 SERPL-SCNC: 22 MMOL/L (ref 20–29)
COLOR UR: YELLOW
CREAT SERPL-MCNC: 1.06 MG/DL (ref 0.76–1.27)
CREAT SERPL-MCNC: 1.06 MG/DL (ref 0.76–1.27)
EOSINOPHIL # BLD AUTO: 0.1 X10E3/UL (ref 0–0.4)
EOSINOPHIL NFR BLD AUTO: 1 %
EPI CELLS #/AREA URNS HPF: NORMAL /HPF (ref 0–10)
ERYTHROCYTE [DISTWIDTH] IN BLOOD BY AUTOMATED COUNT: 12.7 % (ref 11.6–15.4)
EST. AVERAGE GLUCOSE BLD GHB EST-MCNC: 103 MG/DL
GLOBULIN SER-MCNC: 2.8 G/DL (ref 1.5–4.5)
GLOBULIN SER-MCNC: 3 G/DL (ref 1.5–4.5)
GLUCOSE SERPL-MCNC: 92 MG/DL (ref 65–99)
GLUCOSE SERPL-MCNC: 93 MG/DL (ref 65–99)
GLUCOSE UR QL: NEGATIVE
HBA1C MFR BLD: 5.2 % (ref 4.8–5.6)
HCT VFR BLD AUTO: 44.2 % (ref 37.5–51)
HDLC SERPL-MCNC: 37 MG/DL
HGB BLD-MCNC: 15 G/DL (ref 13–17.7)
HGB UR QL STRIP: NEGATIVE
HIV1 RNA # SERPL NAA+PROBE: <20 COPIES/ML
HIV1 RNA SERPL NAA+PROBE-LOG#: NORMAL LOG10COPY/ML
IMM GRANULOCYTES # BLD: 0.1 X10E3/UL (ref 0–0.1)
IMM GRANULOCYTES NFR BLD: 1 %
KETONES UR QL STRIP: NEGATIVE
LDLC SERPL CALC-MCNC: 137 MG/DL (ref 0–99)
LEUKOCYTE ESTERASE UR QL STRIP: NEGATIVE
LYMPHOCYTES # BLD AUTO: 2.9 X10E3/UL (ref 0.7–3.1)
LYMPHOCYTES NFR BLD AUTO: 31 %
MCH RBC QN AUTO: 30.2 PG (ref 26.6–33)
MCHC RBC AUTO-ENTMCNC: 33.9 G/DL (ref 31.5–35.7)
MCV RBC AUTO: 89 FL (ref 79–97)
MICRO URNS: NORMAL
MICRO URNS: NORMAL
MONOCYTES # BLD AUTO: 0.5 X10E3/UL (ref 0.1–0.9)
MONOCYTES NFR BLD AUTO: 5 %
MUCOUS THREADS URNS QL MICRO: PRESENT
NEUTROPHILS # BLD AUTO: 5.7 X10E3/UL (ref 1.4–7)
NEUTROPHILS NFR BLD AUTO: 61 %
NITRITE UR QL STRIP: NEGATIVE
PH UR STRIP: 5.5 [PH] (ref 5–7.5)
PLATELET # BLD AUTO: 338 X10E3/UL (ref 150–450)
POTASSIUM SERPL-SCNC: 4.9 MMOL/L (ref 3.5–5.2)
POTASSIUM SERPL-SCNC: 4.9 MMOL/L (ref 3.5–5.2)
PROT SERPL-MCNC: 7.6 G/DL (ref 6–8.5)
PROT SERPL-MCNC: 7.9 G/DL (ref 6–8.5)
PROT UR QL STRIP: NEGATIVE
RBC # BLD AUTO: 4.96 X10E6/UL (ref 4.14–5.8)
RBC #/AREA URNS HPF: NORMAL /HPF (ref 0–2)
SL AMB EGFR AFRICAN AMERICAN: 108 ML/MIN/1.73
SL AMB EGFR AFRICAN AMERICAN: 108 ML/MIN/1.73
SL AMB EGFR NON AFRICAN AMERICAN: 93 ML/MIN/1.73
SL AMB EGFR NON AFRICAN AMERICAN: 93 ML/MIN/1.73
SODIUM SERPL-SCNC: 137 MMOL/L (ref 134–144)
SODIUM SERPL-SCNC: 139 MMOL/L (ref 134–144)
SP GR UR: 1.02 (ref 1–1.03)
T4 FREE SERPL-MCNC: 1.37 NG/DL (ref 0.82–1.77)
TRIGL SERPL-MCNC: 203 MG/DL (ref 0–149)
TSH SERPL DL<=0.005 MIU/L-ACNC: 3.19 UIU/ML (ref 0.45–4.5)
UROBILINOGEN UR STRIP-ACNC: 0.2 MG/DL (ref 0.2–1)
WBC # BLD AUTO: 9.3 X10E3/UL (ref 3.4–10.8)
WBC #/AREA URNS HPF: NORMAL /HPF (ref 0–5)

## 2021-06-04 NOTE — RESULT ENCOUNTER NOTE
Please call the patient regarding labs - triglycerides better , otherwise labs ok- to be discussed further on upcoming visit

## 2021-06-10 ENCOUNTER — OFFICE VISIT (OUTPATIENT)
Dept: INFECTIOUS DISEASES | Facility: CLINIC | Age: 31
End: 2021-06-10
Payer: COMMERCIAL

## 2021-06-10 VITALS
RESPIRATION RATE: 18 BRPM | HEART RATE: 68 BPM | WEIGHT: 265.4 LBS | TEMPERATURE: 98.1 F | BODY MASS INDEX: 37.15 KG/M2 | HEIGHT: 71 IN | DIASTOLIC BLOOD PRESSURE: 74 MMHG | SYSTOLIC BLOOD PRESSURE: 130 MMHG

## 2021-06-10 DIAGNOSIS — R73.03 PREDIABETES: ICD-10-CM

## 2021-06-10 DIAGNOSIS — Z72.89 OTHER PROBLEMS RELATED TO LIFESTYLE: ICD-10-CM

## 2021-06-10 DIAGNOSIS — E66.01 MORBID OBESITY (HCC): ICD-10-CM

## 2021-06-10 DIAGNOSIS — K76.0 HEPATIC STEATOSIS: ICD-10-CM

## 2021-06-10 DIAGNOSIS — B20 HIV DISEASE (HCC): Primary | ICD-10-CM

## 2021-06-10 DIAGNOSIS — I10 BENIGN ESSENTIAL HYPERTENSION: ICD-10-CM

## 2021-06-10 DIAGNOSIS — E78.1 HYPERTRIGLYCERIDEMIA: ICD-10-CM

## 2021-06-10 DIAGNOSIS — R31.29 MICROHEMATURIA: ICD-10-CM

## 2021-06-10 PROCEDURE — 99215 OFFICE O/P EST HI 40 MIN: CPT | Performed by: INTERNAL MEDICINE

## 2021-06-10 NOTE — PROGRESS NOTES
Progress Note - Infectious Disease   Link Stuart 32 y o  male MRN: [id number]  Unit/Bed#:  Encounter: [id number]      Impression/Plan:  1   HIV-doing well on Biktarvy with an undetectable viral load and a CD4 count of greater than 1000   Recheck labs in 5 months and follow up in 6 months       2   Morbid Obesity-major weight loss since last visit!    He continues to see a dietitian  Stressed the importance of ongoing diet and exercise for weight loss       3   Hypertension-asymptomatic at this time   Blood pressure normal today   Follow-up with the primary      4  Microhematuria-recurrent   renal ultrasound negative except with moderate postvoid residual   Follow-up with Urology     5  Liver function test abnormalities-mild now normalized with weight loss   Suspect steatohepatitis   Ultrasound consistent with hepatic steatosis   Liver function test have now normalized   Will continue monitor LFTs   Stressed importance of ongoing weight loss     6  Pre diabetes-hemoglobin A1c of only 5 4 on repeat   Follow-up with endocrinology     7  Hypertriglyceridemia- Follow-up with endocrinology   Remains on fenofibrate  Improved with weight loss      Patient was provided medication, adherence and prevention education    Subjective:  Routine follow-up for HIV  Patient claims 100% adherence with Biktarvy    Patient denies any notable side effects  Overall the feeling well  The patient denies any fever chills or sweats, denies any nausea vomiting or diarrhea, denies any cough or shortness of breath  ROS:  A complete review of systems is negative other than that noted above in the subjective    Followup portions patient history reviewed and updated as:   Allergies, current medications, past medical history, past social history, past surgical history, and the problem list    Objective:  Vitals:  Vitals:    06/10/21 1325   BP: 130/74   BP Location: Left arm   Patient Position: Sitting   Cuff Size: Adult   Pulse: 68   Resp: 18   Temp: 98 1 °F (36 7 °C)   TempSrc: Temporal   Weight: 120 kg (265 lb 6 4 oz)   Height: 5' 10 5" (1 791 m)       Physical Exam:   General Appearance:  Alert, interactive, appearing well,  nontoxic, no acute distress  Neck:   Supple without lymphadenopathy, no thyromegaly or masses   Throat: Oropharynx moist without lesions  Lungs:   Clear to auscultation bilaterally; no wheezes, rhonchi or rales; respirations unlabored   Heart:  RRR; no murmur, rub or gallop   Abdomen:   Soft, non-tender, non-distended, positive bowel sounds  Extremities: No clubbing, cyanosis or edema   Skin: No new rashes or lesions  No draining wounds noted  Labs, Imaging, & Other studies:   All pertinent labs and imaging studies were personally reviewed    Lab Results   Component Value Date     11/06/2015    K 4 9 06/02/2021     06/02/2021    CO2 22 06/02/2021    ANIONGAP 10 11/06/2015    BUN 17 06/02/2021    CREATININE 1 06 06/02/2021    GLUCOSE 93 11/06/2015    CALCIUM 9 0 02/22/2016    AST 17 06/02/2021    ALT 20 06/02/2021    ALKPHOS 60 02/22/2016    PROT 8 3 (H) 11/06/2015    BILITOT 0 65 11/06/2015    EGFR >60 0 02/22/2016     Lab Results   Component Value Date    WBC 9 3 06/02/2021    HGB 15 0 06/02/2021    HCT 44 2 06/02/2021    MCV 89 06/02/2021     06/02/2021     Lab Results   Component Value Date    HEPCAB Negative 09/13/2012     Lab Results   Component Value Date    HEPCAB Negative 09/13/2012     Lab Results   Component Value Date    RPR Non Reactive 12/07/2020     CD4 ABS   Date/Time Value Ref Range Status   06/02/2021 09:41 AM 1,276 359 - 1,519 /uL Final     HIV-1 RNA by PCR, Qn   Date/Time Value Ref Range Status   06/02/2021 09:41 AM <20 copies/mL Final     Comment:     HIV-1 RNA detected  The reportable range for this assay is 20 to 10,000,000  copies HIV-1 RNA/mL               Current Outpatient Medications:     albuterol (PROVENTIL HFA,VENTOLIN HFA) 90 mcg/act inhaler, Inhale 1-2 puffs every 6 (six) hours as needed for wheezing , Disp: , Rfl:     bictegravir-emtricitab-tenofovir alafenamide (BIKTARVY) -25 MG tablet, Take 1 tablet by mouth daily with breakfast, Disp: 30 tablet, Rfl: 5    buPROPion (WELLBUTRIN XL) 150 mg 24 hr tablet, Take 150 mg by mouth daily, Disp: , Rfl:     Cholecalciferol (VITAMIN D3) 125 MCG (5000 UT) CAPS, 1 cap daily (Patient taking differently: 5,000 Units 1 cap daily), Disp: 90 capsule, Rfl: 3    fenofibrate (TRICOR) 145 mg tablet, Take 1 tablet (145 mg total) by mouth daily, Disp: 90 tablet, Rfl: 0    FLUoxetine (PROzac) 10 mg capsule, Take 10 mg by mouth every morning, Disp: , Rfl:     hydrocortisone 2 5 % cream, Apply topically 4 (four) times a day as needed (irritation), Disp: 30 g, Rfl: 0    Insulin Pen Needle (BD Pen Needle Roz U/F) 32G X 4 MM MISC, Use one daily, Disp: 100 each, Rfl: 2    Insulin Pen Needle 32G X 6 MM MISC, Use daily, Disp: 90 each, Rfl: 0    levocetirizine (XYZAL) 5 MG tablet, Take 1 tablet by mouth daily, Disp: , Rfl:     liraglutide (SAXENDA) injection, Inject 3 mg daily, Disp: 15 pen, Rfl: 1    lisdexamfetamine (VYVANSE) 50 MG capsule, Take 1 capsule by mouth every morning , Disp: , Rfl:     risperiDONE (RisperDAL) 0 25 mg tablet, Take 0 25 mg by mouth daily at bedtime (along with 1 mg), Disp: , Rfl:     risperiDONE (RisperDAL) 1 mg tablet, Take 1 25 mg by mouth daily , Disp: , Rfl:     sildenafil (VIAGRA) 100 mg tablet, Take 1 tablet by mouth one (1) hour prior to intercourse on an empty stomach    Limit to 3 encounters per week , Disp: 90 tablet, Rfl: 0

## 2021-06-10 NOTE — PATIENT INSTRUCTIONS
Continue biktarvy as ordered  Labs should be repeated in 5 months  Follow up in 6 months, or sooner as needed

## 2021-06-28 ENCOUNTER — TELEPHONE (OUTPATIENT)
Dept: ENDOCRINOLOGY | Facility: CLINIC | Age: 31
End: 2021-06-28

## 2021-06-28 ENCOUNTER — OFFICE VISIT (OUTPATIENT)
Dept: ENDOCRINOLOGY | Facility: CLINIC | Age: 31
End: 2021-06-28
Payer: COMMERCIAL

## 2021-06-28 VITALS
DIASTOLIC BLOOD PRESSURE: 72 MMHG | SYSTOLIC BLOOD PRESSURE: 130 MMHG | WEIGHT: 278.4 LBS | HEART RATE: 71 BPM | BODY MASS INDEX: 39.86 KG/M2 | HEIGHT: 70 IN

## 2021-06-28 DIAGNOSIS — E55.9 VITAMIN D DEFICIENCY: ICD-10-CM

## 2021-06-28 DIAGNOSIS — E66.9 OBESITY, CLASS II, BMI 35-39.9: ICD-10-CM

## 2021-06-28 DIAGNOSIS — R73.03 PREDIABETES: Primary | ICD-10-CM

## 2021-06-28 DIAGNOSIS — E78.1 HYPERTRIGLYCERIDEMIA: ICD-10-CM

## 2021-06-28 DIAGNOSIS — F31.81 BIPOLAR 2 DISORDER (HCC): ICD-10-CM

## 2021-06-28 PROCEDURE — 99214 OFFICE O/P EST MOD 30 MIN: CPT | Performed by: PHYSICIAN ASSISTANT

## 2021-06-28 RX ORDER — FENOFIBRATE 145 MG/1
145 TABLET, COATED ORAL DAILY
Qty: 90 TABLET | Refills: 1 | Status: SHIPPED | OUTPATIENT
Start: 2021-06-28 | End: 2021-12-20 | Stop reason: SDUPTHER

## 2021-06-28 RX ORDER — RISPERIDONE 1 MG/1
TABLET, FILM COATED ORAL
Start: 2021-06-28

## 2021-06-28 NOTE — TELEPHONE ENCOUNTER
Patient called and stated that Emma Coppola is available at the pharmacy, please send script for this medication

## 2021-06-28 NOTE — ASSESSMENT & PLAN NOTE
He has done a great job with lifestyle modification and weight loss  When available he would like to switch from [place] to [place] due to potential for additional weight loss and reduce number injections he is taking each week

## 2021-06-28 NOTE — PROGRESS NOTES
Established Patient Progress Note       Chief Complaint   Patient presents with    Obesity    Hyperlipidemia    Vitamin D Deficiency        History of Present Illness:     Donna Soto is a 32 y o  male with a history of Prediabetes, dyslipidemia, Vitamin D Deficiency, and Obesity  Since last visit he has been following with weight management program   He has made dietary improvements and is exercising regularly  His highest weight was 318 pounds and according to his scale at home he is now 265 pounds  He is tolerating Saxenda 3mg daily but would like to transition to weekly [facility] FRED when available  He continues to take fenofibrate for the high triglycerides  For Vitamin D Deficiency, he is taking Vit D 5,000 daily             Patient Active Problem List   Diagnosis    HIV disease ([facility] )    Hepatic steatosis    Obesity, Class II, BMI 35-39 9    Benign essential hypertension    Microhematuria    Folliculitis    Cigarette nicotine dependence without complication    Prediabetes    Vitamin D deficiency    Hypertriglyceridemia    Constipation    Depression    [facility]      Past Medical History:   Diagnosis Date    Abnormal liver function tests     ADD (attention deficit disorder)     Asthma     Benign essential hypertension     Depression     Encounter for screening examination for sexually transmitted disease     HIV (human immunodeficiency virus infection) ([facility] )     Hypercholesteremia     Microhematuria     [facility]bid [facility]     Seasonal allergies       Past Surgical History:   Procedure Laterality Date    TONSILLECTOMY        Family History   Problem Relation Age of Onset    Hyperlipidemia Mother     Diabetes Father     Hyperlipidemia Father     Hypertension Father     Heart disease Father     Diabetes Paternal Aunt     Diabetes Paternal Uncle     Cancer Maternal Grandfather         prostate    Diabetes Paternal Grandmother     Heart disease Paternal Grandmother      Social History     Tobacco Use    Smoking status: Former Smoker     Packs/day: 0 50     Types: Cigarettes     Quit date: 2019     Years since quittin 9    Smokeless tobacco: Never Used   Substance Use Topics    Alcohol use: Yes     Comment: 1 drink per month     No Known Allergies    Current Outpatient Medications:     albuterol (PROVENTIL HFA,VENTOLIN HFA) 90 mcg/act inhaler, Inhale 1-2 puffs every 6 (six) hours as needed for wheezing , Disp: , Rfl:     bictegravir-emtricitab-tenofovir alafenamide (BIKTARVY) -25 MG tablet, Take 1 tablet by mouth daily with breakfast, Disp: 30 tablet, Rfl: 5    Cholecalciferol (VITAMIN D3) 125 MCG (5000 UT) CAPS, 1 cap daily (Patient taking differently: 5,000 Units 1 cap daily), Disp: 90 capsule, Rfl: 3    fenofibrate (TRICOR) 145 mg tablet, Take 1 tablet (145 mg total) by mouth daily, Disp: 90 tablet, Rfl: 1    FLUoxetine (PROzac) 10 mg capsule, Take 10 mg by mouth every morning, Disp: , Rfl:     Insulin Pen Needle (BD Pen Needle Roz U/F) 32G X 4 MM MISC, Use one daily, Disp: 100 each, Rfl: 2    levocetirizine (XYZAL) 5 MG tablet, Take 1 tablet by mouth daily, Disp: , Rfl:     liraglutide (SAXENDA) injection, Inject 3 mg daily, Disp: 15 pen, Rfl: 1    lisdexamfetamine (VYVANSE) 50 MG capsule, Take 1 capsule by mouth every morning , Disp: , Rfl:     risperiDONE (RisperDAL) 1 mg tablet, 1 tab daily, Disp: , Rfl:     sildenafil (VIAGRA) 100 mg tablet, Take 1 tablet by mouth one (1) hour prior to intercourse on an empty stomach  Limit to 3 encounters per week , Disp: 90 tablet, Rfl: 0    buPROPion (WELLBUTRIN XL) 150 mg 24 hr tablet, Take 300 mg by mouth daily , Disp: , Rfl:     Review of Systems   Constitutional: Negative for activity change, appetite change and fatigue  HENT: Negative for sore throat, trouble swallowing and voice change  Eyes: Negative for visual disturbance     Respiratory: Negative for choking, chest tightness and shortness of breath  Cardiovascular: Negative for chest pain, palpitations and leg swelling  Gastrointestinal: Negative for abdominal pain, constipation and diarrhea  Endocrine: Negative for cold intolerance, heat intolerance, polydipsia, polyphagia and polyuria  Genitourinary: Negative for frequency  Musculoskeletal: Negative for arthralgias and myalgias  Skin: Negative for rash  Neurological: Negative for dizziness and syncope  Hematological: Negative for adenopathy  Psychiatric/Behavioral: Negative for sleep disturbance  All other systems reviewed and are negative  Physical Exam:  Body mass index is 39 95 kg/m²  /72   Pulse 71   Ht 5' 10" (1 778 m)   Wt 126 kg (278 lb 6 4 oz)   BMI 39 95 kg/m²    Wt Readings from Last 3 Encounters:   06/28/21 126 kg (278 lb 6 4 oz)   06/10/21 120 kg (265 lb 6 4 oz)   06/02/21 121 kg (265 lb 14 4 oz)       Physical Exam  Vitals reviewed  Constitutional:       General: He is not in acute distress  Appearance: He is well-developed  HENT:      Head: Normocephalic and atraumatic  Eyes:      Conjunctiva/sclera: Conjunctivae normal       Pupils: Pupils are equal, round, and reactive to light  Neck:      Thyroid: No thyromegaly  Cardiovascular:      Rate and Rhythm: Normal rate and regular rhythm  Heart sounds: Normal heart sounds  No murmur heard  Pulmonary:      Effort: Pulmonary effort is normal  No respiratory distress  Breath sounds: Normal breath sounds  No wheezing or rales  Abdominal:      General: Bowel sounds are normal  There is no distension  Palpations: Abdomen is soft  Tenderness: There is no abdominal tenderness  Musculoskeletal:         General: Normal range of motion  Cervical back: Normal range of motion and neck supple  Lymphadenopathy:      Cervical: No cervical adenopathy  Skin:     General: Skin is warm and dry     Neurological:      Mental Status: He is alert and oriented to person, place, and time           Labs:     Component      Latest Ref Rng & Units 6/2/2021 6/2/2021 6/2/2021           9:38 AM  9:38 AM  9:38 AM   Glucose, Random      65 - 99 mg/dL  92    BUN      6 - 20 mg/dL  17    Creatinine      0 76 - 1 27 mg/dL  1 06    eGFR Non       >59 mL/min/1 73  93    eGFR       >59 mL/min/1 73  108    SL AMB BUN/CREATININE RATIO      9 - 20  16    Sodium      134 - 144 mmol/L  137    Potassium      3 5 - 5 2 mmol/L  4 9    Chloride      96 - 106 mmol/L  101    CO2      20 - 29 mmol/L  21    CALCIUM      8 7 - 10 2 mg/dL  10 1    Total Protein      6 0 - 8 5 g/dL  7 6    Albumin      4 0 - 5 0 g/dL  4 8    Globulin, Total      1 5 - 4 5 g/dL  2 8    Albumin/Globulin Ratio      1 2 - 2 2  1 7    TOTAL BILIRUBIN      0 0 - 1 2 mg/dL  0 4    ALKALINE PHOSPHATASE ISOENZYMES      48 - 121 IU/L  42 (L)    AST      0 - 40 IU/L  18    ALT      0 - 44 IU/L  18    Cholesterol      100 - 199 mg/dL   211 (H)   Triglycerides      0 - 149 mg/dL   203 (H)   HDL      >39 mg/dL   37 (L)   LDL Calculated      0 - 99 mg/dL   137 (H)   TSH, POC      0 450 - 4 500 uIU/mL 3 190     Free T4      0 82 - 1 77 ng/dL 1 37     Hemoglobin A1C      4 8 - 5 6 %      eAG, EST AVG Glucose      mg/dL      25-Hydroxy, Vitamin D      30 0 - 100 0 ng/mL        Component      Latest Ref Rng & Units 6/2/2021 6/2/2021 6/2/2021           9:38 AM  9:38 AM  9:41 AM   Glucose, Random      65 - 99 mg/dL   93   BUN      6 - 20 mg/dL   17   Creatinine      0 76 - 1 27 mg/dL   1 06   eGFR Non       >59 mL/min/1 73   93   eGFR       >59 mL/min/1 73   108   SL AMB BUN/CREATININE RATIO      9 - 20   16   Sodium      134 - 144 mmol/L   139   Potassium      3 5 - 5 2 mmol/L   4 9   Chloride      96 - 106 mmol/L   102   CO2      20 - 29 mmol/L   22   CALCIUM      8 7 - 10 2 mg/dL   10 1   Total Protein      6 0 - 8 5 g/dL   7 9   Albumin      4 0 - 5 0 g/dL   4 9   Globulin, Total      1 5 - 4 5 g/dL   3 0   Albumin/Globulin Ratio      1 2 - 2 2   1 6   TOTAL BILIRUBIN      0 0 - 1 2 mg/dL   0 4   ALKALINE PHOSPHATASE ISOENZYMES      48 - 121 IU/L   42 (L)   AST      0 - 40 IU/L   17   ALT      0 - 44 IU/L   20   Cholesterol      100 - 199 mg/dL      Triglycerides      0 - 149 mg/dL      HDL      >39 mg/dL      LDL Calculated      0 - 99 mg/dL      TSH, POC      0 450 - 4 500 uIU/mL      Free T4      0 82 - 1 77 ng/dL      Hemoglobin A1C      4 8 - 5 6 % 5 2     eAG, EST AVG Glucose      mg/dL 103     25-Hydroxy, Vitamin D      30 0 - 100 0 ng/mL  38 3        Impression & Plan:    Problem List Items Addressed This Visit        Other    Obesity, Class II, BMI 35-39 9     He has done a great job with lifestyle modification and weight loss  When available he would like to switch from [facility] to [place] [facility] due to potential for additional weight loss and reduce number injections he is taking each week  Prediabetes - Primary     A1C and Fasting glucose normal   Keep up the good work with lifestyle modification and weight loss  Vitamin D deficiency     Continue Supplements  Hypertriglyceridemia     This has improved significantly with weight loss and fenofibrate  Will continue to monitor  Relevant Medications    fenofibrate (TRICOR) 145 mg tablet    Other Relevant Orders    Comprehensive metabolic panel    Lipid Panel with Direct LDL reflex    Bipolar 2 disorder (HCC)    Relevant Medications    risperiDONE (RisperDAL) 1 mg tablet          Orders Placed This Encounter   Procedures    Comprehensive metabolic panel     This is a patient instruction: Patient fasting for 8 hours or longer recommended  Standing Status:   Future     Standing Expiration Date:   6/28/2022    Lipid Panel with Direct LDL reflex     This is a patient instruction: This test requires patient fasting for 10-12 hours or longer  Drinking of black coffee or black tea is acceptable  Standing Status:   Future     Standing Expiration Date:   6/28/2022       There are no Patient Instructions on file for this visit  Discussed with the patient and all questioned fully answered  He will call me if any problems arise  Follow-up appointment in 6 months       Counseled patient on diagnostic results, prognosis, risk and benefit of treatment options, instruction for management, importance of treatment compliance, Risk  factor reduction and impressions      Laine Velasquez PA-C

## 2021-06-29 NOTE — TELEPHONE ENCOUNTER
COLE-- I [facility]  Patient aware, sent mychart message explaining  This is a weight loss medication to be used in place of Saxenda  Not yet available in EMR- I'll ask IT when I get a chance   Carina said prior Marlise Carlos will be needed and they are sending over covermymeds info

## 2021-07-07 ENCOUNTER — TELEPHONE (OUTPATIENT)
Dept: ENDOCRINOLOGY | Facility: CLINIC | Age: 31
End: 2021-07-07

## 2021-07-07 NOTE — TELEPHONE ENCOUNTER
Mynor Pepe (Fernando Mustafa)  [id number] 1 7MG/0 75ML auto-injectors     Form  [employer] [place] Commercial Electronic Request Form (CB)  Created  7 days ago  Sent to Plan  7 days ago  Plan Response  7 days ago  Submit Clinical Questions  7 days ago  Determination  Wait for Determination  Please wait for [facility] Commercial cb central to return a determination

## 2021-07-07 NOTE — TELEPHONE ENCOUNTER
Spoke to Momo at [facility] in regard to [place] medication prior authorization paperwork sent to us via fax, I did complete and fax back but we filled it out over the phone again  Momo will complete the prior authorization and will give us a determination asap  I spoke to patient and explained this to him

## 2021-07-12 ENCOUNTER — TELEPHONE (OUTPATIENT)
Dept: ENDOCRINOLOGY | Facility: CLINIC | Age: 31
End: 2021-07-12

## 2021-07-12 NOTE — TELEPHONE ENCOUNTER
PA for [facility]  Reference #[id number] denied   The request does not meet the criteria for Medically Necessary

## 2021-07-13 ENCOUNTER — TELEPHONE (OUTPATIENT)
Dept: ENDOCRINOLOGY | Facility: CLINIC | Age: 31
End: 2021-07-13

## 2021-07-13 NOTE — TELEPHONE ENCOUNTER
Informed patient that all the paperwork has been faxed to [employer] # [phone] and a new PA for Zulma Orellana has been started  [facility]

## 2021-07-14 NOTE — TELEPHONE ENCOUNTER
I sent in new paperwork stating he is not taking along with other medications   But it seems he is currently losing weight   And the questionaire that I filled out asking for current weight and previous weight and BMI will definitely affect the result

## 2021-07-14 NOTE — TELEPHONE ENCOUNTER
I reviewed Denial-- The Daniel Michael is not being covered because paperwork says it will be used along with another GLP-1 agonist   Please let insurance know We will be Stopping the [address] when Starting wegovy  The criteria for coverage of saxenda is the same as for wegovy so if they approved the saxenda, they should be able to switch the approval to Daniel Michael

## 2021-07-20 ENCOUNTER — OFFICE VISIT (OUTPATIENT)
Dept: BARIATRICS | Facility: CLINIC | Age: 31
End: 2021-07-20

## 2021-07-20 VITALS — BODY MASS INDEX: 39.93 KG/M2 | WEIGHT: 278.9 LBS | HEIGHT: 70 IN

## 2021-07-20 DIAGNOSIS — R63.5 ABNORMAL WEIGHT GAIN: Primary | ICD-10-CM

## 2021-07-20 PROCEDURE — RECHECK: Performed by: DIETITIAN, REGISTERED

## 2021-07-20 NOTE — PROGRESS NOTES
Weight Management Medical Nutrition Assessment  Zack presented for the 2/3 RD Bundle visit  Christine Blanchard is 278 9#  He has had a 10 9# weight gain  in the past 2 months and overall has lost 19 3# in the past 5 1/2 months  He stated he has not been food logging and knows his calorie level has been higher related to increased dining out lately for lunch and dinner while on a recent vacation   He states he is choosing calorically dense dining out meals and will often have ice cream at night  He stated he has been struggling with depression/ boredom eating and anxiety about returning to work  Patient stated he has realized her needs to start weekly therapy and is going to call today  He stated the earlier part of his day is very controlled and then is not controlled later in the day  Discussed alternate dinner meals and prepared meal service  ([employer] etc)        Recommend:  Resume food logging and pre log dinner meal and night snack and set alarm for afternoon snack      Patient seen by Medical Provider in past 6 months:  yes  Requested to schedule appointment with Medical Provider: No        Anthropometric Measurements  Start Weight (#): 298 2#  (21)  Current Weight (#): 278 9#  TBW % Change from start weight:6 5%  Ideal Body Weight (#):169#  Goal Weight (#):ST# LT#        Weight Loss History  Previous weight loss attempts: Self Created Diets (Portion Control, Healthy Food Choices, etc )  Weight Loss Medications     Food and Nutrition Related History     Food Recall  Calories / Protein/ Carbs   Breakfast:egg/egg white / cheese/ english muffin or banana (Trop 50)  Snack:skip  Lunch:LS Fat free turkey and cheese/ lettuce/ low fat domingo low carb wrap  Snack:skip    Dinner:Dining out more- take out   [place] / Carbs/ Burgers and [place]  Snack:ice cream        Beverages: water and coffee/tea, Trop 50 -measured  Volume of beverage intake: 80oz water     Weekends: Same  Cravings:take out and sweets Trouble area of day:dinner and nightime     Frequency of Eating out: every 2 days  Food restrictions:none reported   Cooking: self   Food Shopping: self     Physical Activity Intake  Activity: Eliptical - 45min   Frequency:2-3x  Physical limitations/barriers to exercise: none reported      Estimated Needs  Energy based (295#)  Bear Chandana Energy Needs:  BMR : 2226 calories    1-2# loss weekly sedentary: 4138-6105  calories            1-2# loss weekly lightly active:7532-1871 calories   Maintenance calories for sedentary activity level: 2672 calories         Protein:92-116gm     (1 2-1 5g/kg IBW)  Fluid: 90oz     (35mL/kg IBW)     Nutrition Diagnosis  Yes;    Overweight/obesity  related to Excess energy intake as evidenced by  BMI more than normative standard for age and sex (obesity-grade Jodi Mancilla)     Nutrition Intervention     Nutrition Prescription  Calories:1149-7466 calories on sedentary days and flex to 2000 calories on cardio days  Protein:90-120gm  Fluid:90oz     Meal Plan (Nicola/Pro/Carb)  Breakfast:300/20/30-45  Snack:  Lunch:400/30/30-45  Snack:200  Dinner:500/30-30-45  Snack:200     Nutrition Education:    Healthy Core Manual  Calorie controlled menu  Lean protein food choices  Healthy snack options  Food journaling tips        Nutrition Counseling:  Strategies: meal planning, portion sizes, healthy snack choices, hydration, fiber intake, protein intake, exercise, food journal        Monitoring and Evaluation:  Evaluation criteria:  Energy Intake  Meet protein needs  Maintain adequate hydration  Monitor weekly weight  Meal planning/preparation  Food journal   Decreased portions at mealtimes and snacks  Physical activity      Barriers to learning:none  Readiness to change: action  Comprehension: good  Expected Compliance: good

## 2021-07-21 DIAGNOSIS — E66.9 OBESITY, CLASS II, BMI 35-39.9: Primary | ICD-10-CM

## 2021-08-04 ENCOUNTER — OFFICE VISIT (OUTPATIENT)
Dept: UROLOGY | Facility: AMBULATORY SURGERY CENTER | Age: 31
End: 2021-08-04
Payer: COMMERCIAL

## 2021-08-04 VITALS
WEIGHT: 269 LBS | DIASTOLIC BLOOD PRESSURE: 80 MMHG | SYSTOLIC BLOOD PRESSURE: 140 MMHG | HEART RATE: 68 BPM | BODY MASS INDEX: 38.6 KG/M2

## 2021-08-04 DIAGNOSIS — N52.9 ERECTILE DYSFUNCTION, UNSPECIFIED ERECTILE DYSFUNCTION TYPE: ICD-10-CM

## 2021-08-04 PROCEDURE — 99213 OFFICE O/P EST LOW 20 MIN: CPT | Performed by: NURSE PRACTITIONER

## 2021-08-04 RX ORDER — SILDENAFIL 100 MG/1
TABLET, FILM COATED ORAL
Qty: 90 TABLET | Refills: 3 | Status: SHIPPED | OUTPATIENT
Start: 2021-08-04 | End: 2021-12-13 | Stop reason: ALTCHOICE

## 2021-08-04 NOTE — PROGRESS NOTES
8/4/2021    Assessment and Plan    32 y o  male previously managed by Dr Suzanne Swartz  1 Microscopic hematuria  ·  asymptomatic  ·  most recent urinalysis with microscopy performed 06/02/2021 unremarkable -negative for microscopic blood    2  Overactive bladder symptoms  ·  maintain adequate hydration upwards to 40 oz of water intake per day  ·  we discussed the need to avoid bladder irritating foods and beverages  ·  if symptoms worsen or continue to be bothersome consider the use of anticholinergic agents-oxybutynin  · Follow up in the office in 1 year    3  Erectile dysfunction  ·  continue sildenafil- prescription refill provided    4  Family history prostate cancer  ·   Maternal grandfather    History of Present Illness  Blanca Walker is a 32 y o  male here for follow up evaluation of    microscopic hematuria, overactive bladder symptoms and erectile dysfunction   Patient reports microscopic hematuria noted on a urine dip in his PCPs office and is referred to the office for further evaluation   Patient also complains of overactive bladder symptoms; he reports urinary frequency and urgency and occasional weak urinary stream   Patient does report occasional erectile dysfunction   Patient reports difficulty maintaining an erection suitable for sexual activity   Patient denies smoking and the use/abuse of illicit substances and alcohol   Patient reports a significant family history of benign prostatic hyperplasia in his father and prostate cancer in his grandfather      Past medical history includes HIV, hypertension, "prediabetes", asthma, ADD, depression      At previous office visit urinalysis with microscopy was ordered which resulted in negative findings including microscopic hematuria  Ultrasound of kidney and bladder performed which showed no hydronephrosis in otherwise an unremarkable evaluation  Review of Systems   Constitutional: Negative for chills and fever     Respiratory: Negative for cough and shortness of breath  Cardiovascular: Negative for chest pain  Gastrointestinal: Negative for abdominal distention, abdominal pain, blood in stool, nausea and vomiting  Genitourinary: Negative for difficulty urinating, dysuria, enuresis, flank pain, frequency, hematuria and urgency  Musculoskeletal: Negative for back pain  Skin: Negative for rash  Neurological: Negative for dizziness       Past Medical History  Past Medical History:   Diagnosis Date    Abnormal liver function tests     ADD (attention deficit disorder)     Asthma     Benign essential hypertension     Depression     Encounter for screening examination for sexually transmitted disease     HIV (human immunodeficiency virus infection) ([place] [id number] )     Hypercholesteremia     Microhematuria     Morbid obesity [facility] )     Seasonal allergies        Past Social History  Past Surgical History:   Procedure Laterality Date    TONSILLECTOMY       Social History     Tobacco Use   Smoking Status Former Smoker    Packs/day: 0 50    Types: Cigarettes    Quit date: 2019    Years since quittin 0   Smokeless Tobacco Never Used       Past Family History  Family History   Problem Relation Age of Onset    Hyperlipidemia Mother     Diabetes Father     Hyperlipidemia Father     Hypertension Father     Heart disease Father     Diabetes Paternal Aunt     Diabetes Paternal Uncle     Cancer Maternal Grandfather         prostate    Diabetes Paternal Grandmother     Heart disease Paternal Grandmother        Past Social history  Social History     Socioeconomic History    Marital status: Single     Spouse name: Not on file    Number of children: Not on file    Years of education: Not on file    Highest education level: Not on file   Occupational History    Not on file   Tobacco Use    Smoking status: Former Smoker     Packs/day: 0 50     Types: Cigarettes     Quit date: 2019     Years since quittin 0    Smokeless tobacco: Never Used   Vaping Use    Vaping Use: Former    Substances: Nicotine, Flavoring   Substance and Sexual Activity    Alcohol use: Yes     Comment: 1 drink per month    Drug use: Yes     Frequency: 7 0 times per week     Types: Marijuana    Sexual activity: Yes     Partners: Male   Other Topics Concern    Not on file   Social History Narrative    Not on file     Social Determinants of Health     Financial Resource Strain:     Difficulty of Paying Living Expenses:    Food Insecurity:     Worried About Running Out of Food in the Last Year:     [address] in the Last Year:    Transportation Needs:     Lack of Transportation (Medical):      Lack of Transportation (Non-Medical):    Physical Activity:     Days of Exercise per Week:     Minutes of Exercise per Session:    Stress:     Feeling of Stress :    Social Connections:     Frequency of Communication with Friends and Family:     Frequency of Social Gatherings with Friends and Family:     Attends Yazidism Services:     Active Member of Clubs or Organizations:     Attends Club or Organization Meetings:     Marital Status:    Intimate Partner Violence:     Fear of Current or Ex-Partner:     Emotionally Abused:     Physically Abused:     Sexually Abused:        Current Medications  Current Outpatient Medications   Medication Sig Dispense Refill    albuterol (PROVENTIL HFA,VENTOLIN HFA) 90 mcg/act inhaler Inhale 1-2 puffs every 6 (six) hours as needed for wheezing       bictegravir-emtricitab-tenofovir alafenamide (BIKTARVY) -25 MG tablet Take 1 tablet by mouth daily with breakfast 30 tablet 5    Cholecalciferol (VITAMIN D3) 125 MCG (5000 UT) CAPS 1 cap daily (Patient taking differently: 5,000 Units 1 cap daily) 90 capsule 3    fenofibrate (TRICOR) 145 mg tablet Take 1 tablet (145 mg total) by mouth daily 90 tablet 1    FLUoxetine (PROzac) 10 mg capsule Take 10 mg by mouth every morning      levocetirizine (XYZAL) 5 MG tablet Take 1 tablet by mouth daily      lisdexamfetamine (VYVANSE) 50 MG capsule Take 1 capsule by mouth every morning       risperiDONE (RisperDAL) 1 mg tablet 1 tab daily      Semaglutide-Weight Management (WEGOVY) 1 7 MG/0 75ML Inject 0 75 mL (1 7 mg total) under the skin once a week      sildenafil (VIAGRA) 100 mg tablet Take 1 tablet by mouth one (1) hour prior to intercourse on an empty stomach  Limit to 3 encounters per week  90 tablet 3    buPROPion (WELLBUTRIN XL) 150 mg 24 hr tablet Take 300 mg by mouth daily       Insulin Pen Needle (BD Pen Needle Roz U/F) 32G X 4 MM MISC Use one daily 100 each 2     No current facility-administered medications for this visit  Allergies  No Known Allergies      The following portions of the patient's history were reviewed and updated as appropriate: allergies, current medications, past medical history, past social history, past surgical history and problem list       Vitals  Vitals:    08/04/21 1510   BP: 140/80   Pulse: 68   Weight: 122 kg (269 lb)           Physical Exam  Physical Exam  Vitals reviewed  Constitutional:       General: He is not in acute distress  Appearance: Normal appearance  He is normal weight  Pulmonary:      Effort: No respiratory distress  Breath sounds: Normal breath sounds  Skin:     General: Skin is warm and dry  Neurological:      General: No focal deficit present  Mental Status: He is alert and oriented to person, place, and time  Psychiatric:         Mood and Affect: Mood normal          Behavior: Behavior normal        Results  No results found for this or any previous visit (from the past 1 hour(s))  ]  No results found for: PSA  Lab Results   Component Value Date    GLUCOSE 93 11/06/2015    CALCIUM 9 0 02/22/2016     11/06/2015    K 4 9 06/02/2021    CO2 22 06/02/2021     06/02/2021    BUN 17 06/02/2021    CREATININE 1 06 06/02/2021     Lab Results   Component Value Date    WBC 9 3 06/02/2021    HGB 15 0 06/02/2021    HCT 44 2 06/02/2021    MCV 89 06/02/2021     06/02/2021     Orders  No orders of the defined types were placed in this encounter        DEVI Bernstein

## 2021-08-10 DIAGNOSIS — E66.9 OBESITY, CLASS II, BMI 35-39.9: Primary | ICD-10-CM

## 2021-08-10 RX ORDER — BUPROPION HYDROCHLORIDE 300 MG/1
300 TABLET ORAL DAILY
COMMUNITY

## 2021-09-21 NOTE — PROGRESS NOTES
Weight Management Medical Nutrition Assessment  Zack presented for the 3/3 RD Bundle visit  Adrien Bass is 274 9 #  He has had a 4# weight loss in the past 2 months and overall has lost 23 3# in the past 7 months  He stated he has not been food logging and knows his calorie level has been higher related to  increased dining out lately and snacking at night  He states he has been struggling with depression lately with decreased motivation  He has started to see a therapist and his antidepressant medication hasbeen increased -feels that is helping  He stated the earlier part of his day is very controlled and then is not controlled later in the day  Discussed alternate dinner meals and prepared meal service  (Hello Fresh etc)      Wegovy - feeling full on less volume   Recommend:  Resume food logging and pre log dinner meal and night snack       Patient seen by Medical Provider in past 6 months:  yes  Requested to schedule appointment with Medical Provider: No        Anthropometric Measurements  Start Weight (#): 298 2#  (21)  Current Weight (#): 274 9#  TBW % Change from start weight:7 8%  Ideal Body Weight (#):169#  Goal Weight (#):ST# LT#        Weight Loss History  Previous weight loss attempts: Self Created Diets (Portion Control, Healthy Food Choices, etc )  Weight Loss Medications     Food and Nutrition Related History     Food Recall  Calories / Protein/ Carbs   Breakfast:egg/egg white / cheese/ english muffin or banana (Trop 50)  Was skipping   Snack:skip  Lunch:LS Fat free turkey and cheese/ lettuce/ low fat domingo low carb wrap  Snack:[employer] Go Lean (170 calories ) or [employer] protein Bar   Dinner:Hello Fresh meals 3x week (500-600 calories)  Dining out more- take out   [place] / Carbs/ Burgers and [place]  Snack:ice cream-         Beverages: water and coffee/tea, Trop 50 -measured  Volume of beverage intake: 80oz water     Weekends: Same- dining out more   Cravings:take out and sweets   Trouble area of day:dinner and nightime     Frequency of Eating out: every 2 days  Food restrictions:none reported   Cooking: self   Food Shopping: self     Physical Activity Intake  Activity: Eliptical - 45min   Frequency:2-3x  Physical limitations/barriers to exercise: none reported      Estimated Needs  Energy based (295#)  Bear Chandana Energy Needs:  BMR : 2226 calories    1-2# loss weekly sedentary: 3706-7315  calories            1-2# loss weekly lightly active:8552-2131 calories   Maintenance calories for sedentary activity level: 2672 calories         Protein:92-116gm     (1 2-1 5g/kg IBW)  Fluid: 90oz     (35mL/kg IBW)     Nutrition Diagnosis  Yes;    Overweight/obesity  related to Excess energy intake as evidenced by  BMI more than normative standard for age and sex (obesity-grade Evelyn Burkett)     Nutrition Intervention     Nutrition Prescription  Calories:4415-9435 calories on sedentary days and flex to 2000 calories on cardio days  Protein:90-120gm  Fluid:90oz     Meal Plan (Nicola/Pro/Carb)  Breakfast:300/20/30-45  Snack:  Lunch:400/30/30-45  Snack:200  Dinner:500/30-30-45  Snack:200     Nutrition Education:    Healthy Core Manual  Calorie controlled menu  Lean protein food choices  Healthy snack options  Food journaling tips        Nutrition Counseling:  Strategies: meal planning, portion sizes, healthy snack choices, hydration, fiber intake, protein intake, exercise, food journal        Monitoring and Evaluation:  Evaluation criteria:  Energy Intake  Meet protein needs  Maintain adequate hydration  Monitor weekly weight  Meal planning/preparation  Food journal   Decreased portions at mealtimes and snacks  Physical activity      Barriers to learning:none  Readiness to change: action  Comprehension: good  Expected Compliance: good

## 2021-09-22 ENCOUNTER — OFFICE VISIT (OUTPATIENT)
Dept: BARIATRICS | Facility: CLINIC | Age: 31
End: 2021-09-22

## 2021-09-22 VITALS — BODY MASS INDEX: 39.35 KG/M2 | HEIGHT: 70 IN | WEIGHT: 274.9 LBS

## 2021-09-22 DIAGNOSIS — R63.5 ABNORMAL WEIGHT GAIN: Primary | ICD-10-CM

## 2021-09-22 PROCEDURE — RECHECK: Performed by: DIETITIAN, REGISTERED

## 2021-10-05 DIAGNOSIS — E66.01 MORBID OBESITY (HCC): Primary | ICD-10-CM

## 2021-11-17 ENCOUNTER — OFFICE VISIT (OUTPATIENT)
Dept: BARIATRICS | Facility: CLINIC | Age: 31
End: 2021-11-17

## 2021-11-17 VITALS — BODY MASS INDEX: 39.94 KG/M2 | WEIGHT: 285.3 LBS | HEIGHT: 71 IN

## 2021-11-17 DIAGNOSIS — R63.5 ABNORMAL WEIGHT GAIN: Primary | ICD-10-CM

## 2021-11-17 PROCEDURE — RECHECK: Performed by: DIETITIAN, REGISTERED

## 2021-11-29 ENCOUNTER — PATIENT MESSAGE (OUTPATIENT)
Dept: ENDOCRINOLOGY | Facility: CLINIC | Age: 31
End: 2021-11-29

## 2021-12-02 ENCOUNTER — TELEPHONE (OUTPATIENT)
Dept: ENDOCRINOLOGY | Facility: CLINIC | Age: 31
End: 2021-12-02

## 2021-12-11 ENCOUNTER — PATIENT MESSAGE (OUTPATIENT)
Dept: UROLOGY | Facility: AMBULATORY SURGERY CENTER | Age: 31
End: 2021-12-11

## 2021-12-13 ENCOUNTER — TELEPHONE (OUTPATIENT)
Dept: UROLOGY | Facility: AMBULATORY SURGERY CENTER | Age: 31
End: 2021-12-13

## 2021-12-13 DIAGNOSIS — N52.9 ERECTILE DYSFUNCTION, UNSPECIFIED ERECTILE DYSFUNCTION TYPE: ICD-10-CM

## 2021-12-13 DIAGNOSIS — N52.9 ERECTILE DYSFUNCTION, UNSPECIFIED ERECTILE DYSFUNCTION TYPE: Primary | ICD-10-CM

## 2021-12-13 RX ORDER — TADALAFIL 20 MG/1
20 TABLET ORAL DAILY PRN
Qty: 10 TABLET | Refills: 0 | Status: SHIPPED | OUTPATIENT
Start: 2021-12-13 | End: 2022-01-07 | Stop reason: SDUPTHER

## 2021-12-13 RX ORDER — TADALAFIL 20 MG/1
20 TABLET ORAL DAILY PRN
Qty: 10 TABLET | Refills: 0 | Status: SHIPPED | OUTPATIENT
Start: 2021-12-13 | End: 2021-12-13 | Stop reason: SDUPTHER

## 2021-12-18 DIAGNOSIS — B20 HIV DISEASE (HCC): ICD-10-CM

## 2021-12-20 DIAGNOSIS — E78.1 HYPERTRIGLYCERIDEMIA: ICD-10-CM

## 2021-12-21 RX ORDER — FENOFIBRATE 145 MG/1
145 TABLET, COATED ORAL DAILY
Qty: 90 TABLET | Refills: 0 | Status: SHIPPED | OUTPATIENT
Start: 2021-12-21 | End: 2022-02-27 | Stop reason: SDUPTHER

## 2021-12-22 ENCOUNTER — TELEPHONE (OUTPATIENT)
Dept: PODIATRY | Facility: CLINIC | Age: 31
End: 2021-12-22

## 2021-12-23 ENCOUNTER — TELEPHONE (OUTPATIENT)
Dept: ENDOCRINOLOGY | Facility: CLINIC | Age: 31
End: 2021-12-23

## 2022-01-07 DIAGNOSIS — N52.9 ERECTILE DYSFUNCTION, UNSPECIFIED ERECTILE DYSFUNCTION TYPE: ICD-10-CM

## 2022-01-07 LAB
ALBUMIN SERPL-MCNC: 4.8 G/DL (ref 4–5)
ALBUMIN/GLOB SERPL: 1.9 {RATIO} (ref 1.2–2.2)
ALP SERPL-CCNC: 52 IU/L (ref 44–121)
ALT SERPL-CCNC: 32 IU/L (ref 0–44)
AST SERPL-CCNC: 21 IU/L (ref 0–40)
BASOPHILS # BLD AUTO: 0.1 X10E3/UL (ref 0–0.2)
BASOPHILS NFR BLD AUTO: 1 %
BILIRUB SERPL-MCNC: 0.2 MG/DL (ref 0–1.2)
BUN SERPL-MCNC: 18 MG/DL (ref 6–20)
BUN/CREAT SERPL: 16 (ref 9–20)
CALCIUM SERPL-MCNC: 9.5 MG/DL (ref 8.7–10.2)
CD3+CD4+ CELLS # BLD: 1308 /UL (ref 359–1519)
CD3+CD4+ CELLS NFR BLD: 45.1 % (ref 30.8–58.5)
CHLORIDE SERPL-SCNC: 103 MMOL/L (ref 96–106)
CO2 SERPL-SCNC: 21 MMOL/L (ref 20–29)
CREAT SERPL-MCNC: 1.14 MG/DL (ref 0.76–1.27)
EOSINOPHIL # BLD AUTO: 0.2 X10E3/UL (ref 0–0.4)
EOSINOPHIL NFR BLD AUTO: 2 %
ERYTHROCYTE [DISTWIDTH] IN BLOOD BY AUTOMATED COUNT: 12 % (ref 11.6–15.4)
GLOBULIN SER-MCNC: 2.5 G/DL (ref 1.5–4.5)
GLUCOSE SERPL-MCNC: 98 MG/DL (ref 65–99)
HCT VFR BLD AUTO: 41.9 % (ref 37.5–51)
HGB BLD-MCNC: 14.1 G/DL (ref 13–17.7)
HIV1 RNA # SERPL NAA+PROBE: <20 COPIES/ML
HIV1 RNA SERPL NAA+PROBE-LOG#: NORMAL LOG10COPY/ML
IMM GRANULOCYTES # BLD: 0.2 X10E3/UL (ref 0–0.1)
IMM GRANULOCYTES NFR BLD: 2 %
LYMPHOCYTES # BLD AUTO: 2.9 X10E3/UL (ref 0.7–3.1)
LYMPHOCYTES NFR BLD AUTO: 29 %
MCH RBC QN AUTO: 30.3 PG (ref 26.6–33)
MCHC RBC AUTO-ENTMCNC: 33.7 G/DL (ref 31.5–35.7)
MCV RBC AUTO: 90 FL (ref 79–97)
MONOCYTES # BLD AUTO: 0.6 X10E3/UL (ref 0.1–0.9)
MONOCYTES NFR BLD AUTO: 6 %
NEUTROPHILS # BLD AUTO: 6 X10E3/UL (ref 1.4–7)
NEUTROPHILS NFR BLD AUTO: 60 %
PLATELET # BLD AUTO: 348 X10E3/UL (ref 150–450)
POTASSIUM SERPL-SCNC: 4.8 MMOL/L (ref 3.5–5.2)
PROT SERPL-MCNC: 7.3 G/DL (ref 6–8.5)
RBC # BLD AUTO: 4.65 X10E6/UL (ref 4.14–5.8)
SL AMB EGFR AFRICAN AMERICAN: 99 ML/MIN/1.73
SL AMB EGFR NON AFRICAN AMERICAN: 85 ML/MIN/1.73
SODIUM SERPL-SCNC: 138 MMOL/L (ref 134–144)
WBC # BLD AUTO: 10 X10E3/UL (ref 3.4–10.8)

## 2022-01-10 RX ORDER — TADALAFIL 20 MG/1
20 TABLET ORAL DAILY PRN
Qty: 10 TABLET | Refills: 0 | Status: SHIPPED | OUTPATIENT
Start: 2022-01-10 | End: 2022-02-03 | Stop reason: SDUPTHER

## 2022-01-12 ENCOUNTER — OFFICE VISIT (OUTPATIENT)
Dept: INFECTIOUS DISEASES | Facility: CLINIC | Age: 32
End: 2022-01-12
Payer: COMMERCIAL

## 2022-01-12 VITALS
HEIGHT: 71 IN | BODY MASS INDEX: 41.92 KG/M2 | DIASTOLIC BLOOD PRESSURE: 84 MMHG | TEMPERATURE: 97.5 F | HEART RATE: 76 BPM | RESPIRATION RATE: 18 BRPM | WEIGHT: 299.4 LBS | OXYGEN SATURATION: 98 % | SYSTOLIC BLOOD PRESSURE: 128 MMHG

## 2022-01-12 DIAGNOSIS — E78.1 HYPERTRIGLYCERIDEMIA: ICD-10-CM

## 2022-01-12 DIAGNOSIS — R94.5 LIVER FUNCTION ABNORMALITY: ICD-10-CM

## 2022-01-12 DIAGNOSIS — R73.03 PREDIABETES: ICD-10-CM

## 2022-01-12 DIAGNOSIS — E66.01 MORBID OBESITY WITH BMI OF 40.0-44.9, ADULT (HCC): ICD-10-CM

## 2022-01-12 DIAGNOSIS — I10 BENIGN ESSENTIAL HYPERTENSION: Primary | ICD-10-CM

## 2022-01-12 DIAGNOSIS — Z00.00 HEALTHCARE MAINTENANCE: ICD-10-CM

## 2022-01-12 DIAGNOSIS — R31.29 MICROHEMATURIA: ICD-10-CM

## 2022-01-12 DIAGNOSIS — B20 HIV DISEASE (HCC): ICD-10-CM

## 2022-01-12 PROCEDURE — 99215 OFFICE O/P EST HI 40 MIN: CPT | Performed by: INTERNAL MEDICINE

## 2022-01-12 PROCEDURE — 90686 IIV4 VACC NO PRSV 0.5 ML IM: CPT

## 2022-01-12 PROCEDURE — 90471 IMMUNIZATION ADMIN: CPT

## 2022-01-12 NOTE — PATIENT INSTRUCTIONS
Please continue to take your medication as prescribed  Please have your lab work completed in 5 months  Please follow up in 6 months

## 2022-01-12 NOTE — PROGRESS NOTES
Progress Note - Infectious Disease   Natasha Segura 32 y o  male MRN: [id number]  Unit/Bed#:  Encounter: [id number]      Impression/Plan:  1   HIV-doing well on Biktarvy with an undetectable viral load and a CD4 count of greater than 1000   Recheck labs in 5 months and follow up in 6 months       2   Morbid Obesity-patient came back weight once again     He continues to see a dietitian  Stressed the importance of ongoing diet and exercise for weight loss   Patient is to see his dietitian again next week     3   Hypertension-asymptomatic at this time   Blood pressure normal today   Follow-up with the primary      4  Microhematuria-recurrent  Renal ultrasound negative except with moderate postvoid residual   Follow-up with Urology     5  Liver function test abnormalities-mild now normalized with weight loss   Suspect steatohepatitis   Ultrasound consistent with hepatic steatosis   Liver function test have now normalized   Will continue monitor LFTs   Stressed importance of ongoing weight loss     6  Pre diabetes-hemoglobin A1c of only 5 2 on repeat   Follow-up with endocrinology     7  Hypertriglyceridemia- Follow-up with endocrinology   Remains on fenofibrate  Improved with weight loss    8  Healthcare maintenance-influenza vaccine for today      Patient was provided medication, adherence and prevention education    Subjective:  Routine follow-up for HIV  Patient claims 100% adherence with Biktarvy    Patient denies any notable side effects  Overall the feeling well  The patient denies any fever chills or sweats, denies any nausea vomiting or diarrhea, denies any cough or shortness of breath  ROS:  A complete review of systems is negative other than that noted above in the subjective    Followup portions patient history reviewed and updated as:   Allergies, current medications, past medical history, past social history, past surgical history, and the problem list    Objective:  Vitals:  Vitals:    01/12/22 0840   BP: 128/84   BP Location: Left arm   Patient Position: Sitting   Cuff Size: Adult   Pulse: 76   Resp: 18   Temp: 97 5 °F (36 4 °C)   TempSrc: Temporal   SpO2: 98%   Weight: 136 kg (299 lb 6 4 oz)   Height: 5' 10 5" (1 791 m)       Physical Exam:   General Appearance:  Alert, interactive, appearing well,  nontoxic, no acute distress  Neck:   Supple without lymphadenopathy, no thyromegaly or masses   Throat: Oropharynx moist without lesions  Lungs:   Clear to auscultation bilaterally; no wheezes, rhonchi or rales; respirations unlabored   Heart:  RRR; no murmur, rub or gallop   Abdomen:   Soft, non-tender, non-distended, positive bowel sounds  Extremities: No clubbing, cyanosis or edema   Skin: No new rashes or lesions  No draining wounds noted  Labs, Imaging, & Other studies:   All pertinent labs and imaging studies were personally reviewed    Lab Results   Component Value Date     11/06/2015    K 4 8 01/06/2022     01/06/2022    CO2 21 01/06/2022    ANIONGAP 10 11/06/2015    BUN 18 01/06/2022    CREATININE 1 14 01/06/2022    GLUCOSE 93 11/06/2015    CALCIUM 9 0 02/22/2016    AST 21 01/06/2022    ALT 32 01/06/2022    ALKPHOS 60 02/22/2016    PROT 8 3 (H) 11/06/2015    BILITOT 0 65 11/06/2015    EGFR >60 0 02/22/2016     Lab Results   Component Value Date    WBC 10 0 01/06/2022    HGB 14 1 01/06/2022    HCT 41 9 01/06/2022    MCV 90 01/06/2022     01/06/2022     Lab Results   Component Value Date    HEPCAB Negative 09/13/2012     Lab Results   Component Value Date    HEPCAB Negative 09/13/2012     Lab Results   Component Value Date    RPR Non Reactive 12/07/2020     CD4 ABS   Date/Time Value Ref Range Status   01/06/2022 09:18 AM 1,308 359 - 1,519 /uL Final     HIV-1 RNA by PCR, Qn   Date/Time Value Ref Range Status   01/06/2022 09:18 AM <20 copies/mL Final     Comment:     HIV-1 RNA detected  The reportable range for this assay is 20 to 10,000,000  copies HIV-1 RNA/mL  Current Outpatient Medications:     albuterol (PROVENTIL HFA,VENTOLIN HFA) 90 mcg/act inhaler, Inhale 1-2 puffs every 6 (six) hours as needed for wheezing , Disp: , Rfl:     bictegravir-emtricitab-tenofovir alafenamide (BIKTARVY) -25 MG tablet, Take 1 tablet by mouth daily with breakfast, Disp: 30 tablet, Rfl: 2    buPROPion (WELLBUTRIN XL) 300 mg 24 hr tablet, Take 300 mg by mouth daily, Disp: , Rfl:     Cholecalciferol (VITAMIN D3) 125 MCG (5000 UT) CAPS, 1 cap daily (Patient taking differently: 5,000 Units 1 cap daily), Disp: 90 capsule, Rfl: 3    fenofibrate (TRICOR) 145 mg tablet, Take 1 tablet (145 mg total) by mouth daily, Disp: 90 tablet, Rfl: 0    FLUoxetine (PROzac) 10 mg capsule, Take 10 mg by mouth every morning, Disp: , Rfl:     levocetirizine (XYZAL) 5 MG tablet, Take 1 tablet by mouth daily, Disp: , Rfl:     lisdexamfetamine (VYVANSE) 50 MG capsule, Take 1 capsule by mouth every morning , Disp: , Rfl:     risperiDONE (RisperDAL) 1 mg tablet, 1 tab daily, Disp: , Rfl:     Semaglutide-Weight Management (WEGOVY) 2 4 MG/0 75ML, 2 4mg weekly, Disp 4 pens for 28 day supply  , Disp: 3 mL, Rfl: 5    tadalafil (CIALIS) 20 MG tablet, Take 1 tablet (20 mg total) by mouth daily as needed for erectile dysfunction, Disp: 10 tablet, Rfl: 0

## 2022-01-14 ENCOUNTER — TELEPHONE (OUTPATIENT)
Dept: ENDOCRINOLOGY | Facility: CLINIC | Age: 32
End: 2022-01-14

## 2022-01-14 NOTE — TELEPHONE ENCOUNTER
PA request was sent from [address]  PA started but I did deleted because Pt is not having any changes on his was according to OV notes  I did call pt ,left message to call us back, we will wait to submit this PA after his appointment next week with Jordan Schuster       IVAN: [id number]

## 2022-01-20 ENCOUNTER — OFFICE VISIT (OUTPATIENT)
Dept: ENDOCRINOLOGY | Facility: CLINIC | Age: 32
End: 2022-01-20
Payer: COMMERCIAL

## 2022-01-20 VITALS
HEART RATE: 79 BPM | DIASTOLIC BLOOD PRESSURE: 94 MMHG | WEIGHT: 302 LBS | HEIGHT: 71 IN | BODY MASS INDEX: 42.28 KG/M2 | SYSTOLIC BLOOD PRESSURE: 130 MMHG

## 2022-01-20 DIAGNOSIS — E55.9 VITAMIN D DEFICIENCY: ICD-10-CM

## 2022-01-20 DIAGNOSIS — R73.03 PREDIABETES: Primary | ICD-10-CM

## 2022-01-20 DIAGNOSIS — E66.01 MORBID OBESITY WITH BMI OF 40.0-44.9, ADULT (HCC): ICD-10-CM

## 2022-01-20 DIAGNOSIS — I10 BENIGN ESSENTIAL HYPERTENSION: ICD-10-CM

## 2022-01-20 DIAGNOSIS — E78.1 HYPERTRIGLYCERIDEMIA: ICD-10-CM

## 2022-01-20 LAB
ALBUMIN SERPL-MCNC: 4.8 G/DL (ref 4–5)
ALBUMIN/GLOB SERPL: 1.5 {RATIO} (ref 1.2–2.2)
ALP SERPL-CCNC: 45 IU/L (ref 44–121)
ALT SERPL-CCNC: 35 IU/L (ref 0–44)
AST SERPL-CCNC: 24 IU/L (ref 0–40)
BILIRUB SERPL-MCNC: 0.2 MG/DL (ref 0–1.2)
BUN SERPL-MCNC: 13 MG/DL (ref 6–20)
BUN/CREAT SERPL: 14 (ref 9–20)
CALCIUM SERPL-MCNC: 10 MG/DL (ref 8.7–10.2)
CHLORIDE SERPL-SCNC: 101 MMOL/L (ref 96–106)
CO2 SERPL-SCNC: 19 MMOL/L (ref 20–29)
CREAT SERPL-MCNC: 0.9 MG/DL (ref 0.76–1.27)
GLOBULIN SER-MCNC: 3.1 G/DL (ref 1.5–4.5)
GLUCOSE SERPL-MCNC: 110 MG/DL (ref 65–99)
POTASSIUM SERPL-SCNC: 4.8 MMOL/L (ref 3.5–5.2)
PROT SERPL-MCNC: 7.9 G/DL (ref 6–8.5)
SL AMB EGFR AFRICAN AMERICAN: 131 ML/MIN/1.73
SL AMB EGFR NON AFRICAN AMERICAN: 113 ML/MIN/1.73
SODIUM SERPL-SCNC: 138 MMOL/L (ref 134–144)
TSH SERPL DL<=0.005 MIU/L-ACNC: 3.11 UIU/ML (ref 0.45–4.5)

## 2022-01-20 PROCEDURE — 99214 OFFICE O/P EST MOD 30 MIN: CPT | Performed by: PHYSICIAN ASSISTANT

## 2022-01-20 RX ORDER — MAG HYDROX/ALUMINUM HYD/SIMETH 400-400-40
SUSPENSION, ORAL (FINAL DOSE FORM) ORAL
Qty: 90 CAPSULE | Refills: 3
Start: 2022-01-20

## 2022-01-20 RX ORDER — RISPERIDONE 0.25 MG/1
0.25 TABLET, FILM COATED ORAL DAILY
COMMUNITY

## 2022-01-20 RX ORDER — INDOMETHACIN 75 MG/1
75 CAPSULE, EXTENDED RELEASE ORAL AS NEEDED
COMMUNITY
Start: 2021-12-23

## 2022-01-20 NOTE — ASSESSMENT & PLAN NOTE
He has has weight gain since last visit due to worsening depression  He would like to remain off the [facility] due to side effects and also lack of coverage on his current insurance plan  He has upcoming visit with weight management/dietician  Continue to focus on lifestyle modification/weight loss

## 2022-01-20 NOTE — ASSESSMENT & PLAN NOTE
Will continue to monitor as he is motivated to return to dietary improvements/exercise    Discussed option of metformin- not interested at this time

## 2022-01-20 NOTE — PROGRESS NOTES
Established Patient Progress Note       Chief Complaint   Patient presents with    Obesity    Vitamin D Deficiency    Abnormal Endocrine Labs    Hyperlipidemia        History of Present Illness:     Trixie Ramirez is a 32 y o  male with a history of Prediabetes, dyslipidemia, Vitamin D Deficiency, and Obesity  Since last visit he has had about 30 pound weight gain  Was having a lot of difficulty with depression and has not been as motivated with his healthy lifestyle changes  He was having issues with orthostatis/syncope which has improved  He has been off the [facility]- reports he is feeling much better off of the medication  He continues to take fenofibrate for the high triglycerides  For Vitamin D Deficiency, he is taking Vit D 5,000 Mon-Fri, 10,000 units on the weekend           Patient Active Problem List   Diagnosis    HIV disease ([facility] )    Hepatic steatosis    Morbid obesity with BMI of 40 0-44 9, adult ([facility] )    Benign essential hypertension    Microhematuria    Folliculitis    Cigarette nicotine dependence without complication    Prediabetes    Vitamin D deficiency    Hypertriglyceridemia    Constipation    Depression    Bipolar 2 disorder ([facility] )      Past Medical History:   Diagnosis Date    Abnormal liver function tests     ADD (attention deficit disorder)     Asthma     Benign essential hypertension     Depression     Encounter for screening examination for sexually transmitted disease     HIV (human immunodeficiency virus infection) ([facility] )     Hypercholesteremia     Microhematuria     Morbid obesity ([facility] )     Seasonal allergies       Past Surgical History:   Procedure Laterality Date    TONSILLECTOMY        Family History   Problem Relation Age of Onset    Hyperlipidemia Mother     Diabetes Father     Hyperlipidemia Father     Hypertension Father     Heart disease Father     Diabetes Paternal Aunt     Diabetes Paternal Uncle     Cancer Maternal Grandfather prostate    Diabetes Paternal Grandmother     Heart disease Paternal Grandmother      Social History     Tobacco Use    Smoking status: Former Smoker     Packs/day: 0 50     Types: Cigarettes     Quit date: 2019     Years since quittin 4    Smokeless tobacco: Never Used   Substance Use Topics    Alcohol use: Yes     Comment: 1 drink per month     No Known Allergies    Current Outpatient Medications:     albuterol (PROVENTIL HFA,VENTOLIN HFA) 90 mcg/act inhaler, Inhale 1-2 puffs every 6 (six) hours as needed for wheezing , Disp: , Rfl:     bictegravir-emtricitab-tenofovir alafenamide (BIKTARVY) -25 MG tablet, Take 1 tablet by mouth daily with breakfast, Disp: 90 tablet, Rfl: 0    buPROPion (WELLBUTRIN XL) 300 mg 24 hr tablet, Take 300 mg by mouth daily, Disp: , Rfl:     Cholecalciferol (Vitamin D3) 125 MCG (5000 UT) CAPS, 1 cap Mon-Fri, 2 caps sat/sun, Disp: 90 capsule, Rfl: 3    fenofibrate (TRICOR) 145 mg tablet, Take 1 tablet (145 mg total) by mouth daily, Disp: 90 tablet, Rfl: 0    indomethacin (INDOCIN SR) 75 mg CR capsule, Take 75 mg by mouth as needed, Disp: , Rfl:     levocetirizine (XYZAL) 5 MG tablet, Take 1 tablet by mouth daily, Disp: , Rfl:     lisdexamfetamine (VYVANSE) 50 MG capsule, Take 1 capsule by mouth every morning , Disp: , Rfl:     risperiDONE (RisperDAL) 0 25 mg tablet, Take 0 25 mg by mouth daily, Disp: , Rfl:     risperiDONE (RisperDAL) 1 mg tablet, 1 tab daily, Disp: , Rfl:     tadalafil (CIALIS) 20 MG tablet, Take 1 tablet (20 mg total) by mouth daily as needed for erectile dysfunction, Disp: 10 tablet, Rfl: 0    Review of Systems   Constitutional: Negative for activity change, appetite change and fatigue  HENT: Negative for sore throat, trouble swallowing and voice change  Eyes: Negative for visual disturbance  Respiratory: Negative for choking, chest tightness and shortness of breath      Cardiovascular: Negative for chest pain, palpitations and leg swelling  Gastrointestinal: Negative for abdominal pain, constipation and diarrhea  Endocrine: Negative for cold intolerance, heat intolerance, polydipsia, polyphagia and polyuria  Genitourinary: Negative for frequency  Musculoskeletal: Negative for arthralgias and myalgias  Skin: Negative for rash  Neurological: Negative for dizziness and syncope  Hematological: Negative for adenopathy  Psychiatric/Behavioral: Negative for sleep disturbance  All other systems reviewed and are negative  Physical Exam:  Body mass index is 42 72 kg/m²  /94 (BP Location: Left arm, Patient Position: Sitting)   Pulse 79   Ht 5' 10 5" (1 791 m)   Wt (!) 137 kg (302 lb)   BMI 42 72 kg/m²    Wt Readings from Last 3 Encounters:   01/20/22 (!) 137 kg (302 lb)   01/12/22 136 kg (299 lb 6 4 oz)   11/17/21 129 kg (285 lb 4 8 oz)       Physical Exam  Vitals reviewed  Constitutional:       General: He is not in acute distress  Appearance: He is well-developed  HENT:      Head: Normocephalic and atraumatic  Eyes:      Conjunctiva/sclera: Conjunctivae normal       Pupils: Pupils are equal, round, and reactive to light  Neck:      Thyroid: No thyromegaly  Cardiovascular:      Rate and Rhythm: Normal rate and regular rhythm  Heart sounds: Normal heart sounds  No murmur heard  Pulmonary:      Effort: Pulmonary effort is normal  No respiratory distress  Breath sounds: Normal breath sounds  No wheezing or rales  Abdominal:      General: Bowel sounds are normal  There is no distension  Palpations: Abdomen is soft  Tenderness: There is no abdominal tenderness  Musculoskeletal:         General: Normal range of motion  Cervical back: Normal range of motion and neck supple  Lymphadenopathy:      Cervical: No cervical adenopathy  Skin:     General: Skin is warm and dry  Neurological:      Mental Status: He is alert and oriented to person, place, and time           Labs: Lab Results   Component Value Date    CREATININE 0 90 01/19/2022    CREATININE 1 14 01/06/2022    CREATININE 1 06 06/02/2021    BUN 13 01/19/2022     11/06/2015    K 4 8 01/19/2022     01/19/2022    CO2 19 (L) 01/19/2022     eGFR   Date Value Ref Range Status   02/22/2016 >60 0 ml/min/1 73sq m Final       Lab Results   Component Value Date    CHOL 224 03/10/2015    HDL 37 (L) 06/02/2021    TRIG 203 (H) 06/02/2021       Lab Results   Component Value Date    ALT 35 01/19/2022    AST 24 01/19/2022    ALKPHOS 60 02/22/2016    BILITOT 0 65 11/06/2015       Lab Results   Component Value Date    FREET4 1 37 06/02/2021         Impression & Plan:    Problem List Items Addressed This Visit        Cardiovascular and Mediastinum    Benign essential hypertension     Blood pressure elevated today- reduce caffeine intake and focus on weight loss  Other    Morbid obesity with BMI of 40 0-44 9, adult ([address] )     He has has weight gain since last visit due to worsening depression  He would like to remain off the [facility] due to side effects and also lack of coverage on his current insurance plan  He has upcoming visit with weight management/dietician  Continue to focus on lifestyle modification/weight loss  Prediabetes - Primary     Will continue to monitor as he is motivated to return to dietary improvements/exercise    Discussed option of metformin- not interested at this time  Relevant Orders    Hemoglobin A1C    Lipid Panel with Direct LDL reflex    Comprehensive metabolic panel    Vitamin D deficiency     Continue supplement         Relevant Medications    Cholecalciferol (Vitamin D3) 125 MCG (5000 UT) CAPS    Other Relevant Orders    Vitamin D 25 hydroxy    Hypertriglyceridemia     Continue lifestyle modification and fenofibrate  Lipid Panel may have been missed on recent lab testing   Will order repeat testing to be done before next visit            Relevant Orders    Lipid Panel with Direct LDL reflex    Comprehensive metabolic panel          Orders Placed This Encounter   Procedures    Hemoglobin A1C     Standing Status:   Future     Standing Expiration Date:   1/20/2023    Lipid Panel with Direct LDL reflex     This is a patient instruction: This test requires patient fasting for 10-12 hours or longer  Drinking of black coffee or black tea is acceptable  Standing Status:   Future     Standing Expiration Date:   1/20/2023    Comprehensive metabolic panel     This is a patient instruction: Patient fasting for 8 hours or longer recommended  Standing Status:   Future     Standing Expiration Date:   1/20/2023    Vitamin D 25 hydroxy     Standing Status:   Future     Standing Expiration Date:   1/20/2023       There are no Patient Instructions on file for this visit  Discussed with the patient and all questioned fully answered  He will call me if any problems arise  Follow-up appointment in 6 months       Counseled patient on diagnostic results, prognosis, risk and benefit of treatment options, instruction for management, importance of treatment compliance, Risk  factor reduction and impressions      Dorothy García PA-C

## 2022-01-20 NOTE — TELEPHONE ENCOUNTER
Pt had an appointment with Shani Colin today 01/20/2022, Pt not linger taking [facility]

## 2022-01-20 NOTE — ASSESSMENT & PLAN NOTE
Continue lifestyle modification and fenofibrate  Lipid Panel may have been missed on recent lab testing   Will order repeat testing to be done before next visit

## 2022-01-25 ENCOUNTER — TELEPHONE (OUTPATIENT)
Dept: BARIATRICS | Facility: CLINIC | Age: 32
End: 2022-01-25

## 2022-02-03 DIAGNOSIS — N52.9 ERECTILE DYSFUNCTION, UNSPECIFIED ERECTILE DYSFUNCTION TYPE: ICD-10-CM

## 2022-02-04 RX ORDER — TADALAFIL 20 MG/1
20 TABLET ORAL DAILY PRN
Qty: 10 TABLET | Refills: 0 | Status: SHIPPED | OUTPATIENT
Start: 2022-02-04 | End: 2022-02-27 | Stop reason: SDUPTHER

## 2022-02-27 DIAGNOSIS — N52.9 ERECTILE DYSFUNCTION, UNSPECIFIED ERECTILE DYSFUNCTION TYPE: ICD-10-CM

## 2022-02-28 RX ORDER — TADALAFIL 20 MG/1
20 TABLET ORAL DAILY PRN
Qty: 10 TABLET | Refills: 0 | Status: SHIPPED | OUTPATIENT
Start: 2022-02-28 | End: 2022-03-25 | Stop reason: SDUPTHER

## 2022-03-25 DIAGNOSIS — N52.9 ERECTILE DYSFUNCTION, UNSPECIFIED ERECTILE DYSFUNCTION TYPE: ICD-10-CM

## 2022-03-25 RX ORDER — TADALAFIL 20 MG/1
20 TABLET ORAL DAILY PRN
Qty: 10 TABLET | Refills: 0 | Status: SHIPPED | OUTPATIENT
Start: 2022-03-25 | End: 2022-05-02 | Stop reason: SDUPTHER

## 2022-05-02 DIAGNOSIS — N52.9 ERECTILE DYSFUNCTION, UNSPECIFIED ERECTILE DYSFUNCTION TYPE: ICD-10-CM

## 2022-05-02 RX ORDER — TADALAFIL 20 MG/1
20 TABLET ORAL DAILY PRN
Qty: 10 TABLET | Refills: 0 | Status: SHIPPED | OUTPATIENT
Start: 2022-05-02 | End: 2022-07-20 | Stop reason: SDUPTHER

## 2022-05-23 DIAGNOSIS — B20 HIV DISEASE (HCC): ICD-10-CM

## 2022-05-23 RX ORDER — BICTEGRAVIR SODIUM, EMTRICITABINE, AND TENOFOVIR ALAFENAMIDE FUMARATE 50; 200; 25 MG/1; MG/1; MG/1
TABLET ORAL
Qty: 90 TABLET | Refills: 0 | Status: SHIPPED | OUTPATIENT
Start: 2022-05-23

## 2022-06-25 LAB
25(OH)D3+25(OH)D2 SERPL-MCNC: 40.2 NG/ML (ref 30–100)
ALBUMIN SERPL-MCNC: 4.6 G/DL (ref 4–5)
ALBUMIN/GLOB SERPL: 1.6 {RATIO} (ref 1.2–2.2)
ALP SERPL-CCNC: 44 IU/L (ref 44–121)
ALT SERPL-CCNC: 37 IU/L (ref 0–44)
AST SERPL-CCNC: 27 IU/L (ref 0–40)
BILIRUB SERPL-MCNC: 0.4 MG/DL (ref 0–1.2)
BUN SERPL-MCNC: 15 MG/DL (ref 6–20)
BUN/CREAT SERPL: 14 (ref 9–20)
CALCIUM SERPL-MCNC: 9.6 MG/DL (ref 8.7–10.2)
CHLORIDE SERPL-SCNC: 103 MMOL/L (ref 96–106)
CHOLEST SERPL-MCNC: 236 MG/DL (ref 100–199)
CO2 SERPL-SCNC: 21 MMOL/L (ref 20–29)
CREAT SERPL-MCNC: 1.07 MG/DL (ref 0.76–1.27)
EGFR: 95 ML/MIN/1.73
GLOBULIN SER-MCNC: 2.9 G/DL (ref 1.5–4.5)
GLUCOSE SERPL-MCNC: 106 MG/DL (ref 65–99)
HBA1C MFR BLD: 5.4 % (ref 4.8–5.6)
HDLC SERPL-MCNC: 33 MG/DL
LDLC SERPL CALC-MCNC: 138 MG/DL (ref 0–99)
POTASSIUM SERPL-SCNC: 4.7 MMOL/L (ref 3.5–5.2)
PROT SERPL-MCNC: 7.5 G/DL (ref 6–8.5)
SODIUM SERPL-SCNC: 138 MMOL/L (ref 134–144)
TRIGL SERPL-MCNC: 359 MG/DL (ref 0–149)

## 2022-06-28 NOTE — PROGRESS NOTES
Virtual Brief Visit    Patient is located in the following state in which I hold an active license [place]      Assessment/Plan:    Problem List Items Addressed This Visit    None       Progress Note - Infectious Disease   Elliott Tuttle 28 y o  male MRN: [id number]  Unit/Bed#:  Encounter: [id number]      Impression/Plan:  1   HIV-doing well on Biktarvy with an undetectable viral load and a CD4 count of greater than 1000   Recheck labs in 5 months and follow up in 6 months   Would consider changing ART to assist with weight loss if the patient continues to struggle with losing weight     2   Morbid Obesity-continue to monitor and stressed the importance of weight loss      3   Hypertension-asymptomatic at this time  Unable to assess when this virtual visit     4  Microhematuria-recurrent  Renal ultrasound negative except with moderate postvoid residual   Follow-up with Urology     5  Liver function test abnormalities-now normalized with weight loss  Suspect steatohepatitis   Ultrasound consistent with hepatic steatosis  Will continue monitor LFTs   Stressed importance of ongoing weight loss     6  Pre diabetes-hemoglobin A1c of only 5 2 on repeat   Follow-up with endocrinology     7  Hypertriglyceridemia- Follow-up with endocrinology   Remains on fenofibrate   A bit worse since the last visit  Patient was provided medication, adherence and prevention education    Subjective:  Routine follow-up for HIV  Patient claims 100% adherence with     Patient denies any notable side effects  Overall the feeling well  The patient denies any fever chills or sweats, denies any nausea vomiting or diarrhea, denies any cough or shortness of breath  ROS:  A complete review of systems is negative other than that noted above in the subjective    Followup portions patient history reviewed and updated as:   Allergies, current medications, past medical history, past social history, past surgical history, and the problem list    Objective:    Able to communicate without difficulty    Labs, Imaging, & Other studies:   All pertinent labs and imaging studies were personally reviewed     CD4 1178, creat 1 11, LFTs normal, WBC 9 1, plt 358, HIV RNA less than 20      Current Outpatient Medications:     albuterol (PROVENTIL HFA,VENTOLIN HFA) 90 mcg/act inhaler, Inhale 1-2 puffs every 6 (six) hours as needed for wheezing , Disp: , Rfl:     Biktarvy -25 MG tablet, Take 1 tablet by mouth daily with breakfast , Disp: 90 tablet, Rfl: 0    buPROPion (WELLBUTRIN XL) 300 mg 24 hr tablet, Take 300 mg by mouth daily, Disp: , Rfl:     Cholecalciferol (Vitamin D3) 125 MCG (5000 UT) CAPS, 1 cap Mon-Fri, 2 caps sat/sun, Disp: 90 capsule, Rfl: 3    fenofibrate (TRICOR) 145 mg tablet, Take 1 tablet by mouth once daily  , Disp: 90 tablet, Rfl: 3    fenofibrate (TRICOR) 145 mg tablet, Take 1 tablet (145 mg total) by mouth daily, Disp: 90 tablet, Rfl: 3    indomethacin (INDOCIN SR) 75 mg CR capsule, Take 75 mg by mouth as needed, Disp: , Rfl:     levocetirizine (XYZAL) 5 MG tablet, Take 1 tablet by mouth daily, Disp: , Rfl:     lisdexamfetamine (VYVANSE) 50 MG capsule, Take 1 capsule by mouth every morning , Disp: , Rfl:     risperiDONE (RisperDAL) 0 25 mg tablet, Take 0 25 mg by mouth daily, Disp: , Rfl:     risperiDONE (RisperDAL) 1 mg tablet, 1 tab daily, Disp: , Rfl:     tadalafil (CIALIS) 20 MG tablet, Take 1 tablet (20 mg total) by mouth daily as needed for erectile dysfunction, Disp: 10 tablet, Rfl: 0      Recent Visits  No visits were found meeting these conditions  Showing recent visits within past 7 days and meeting all other requirements  Future Appointments  No visits were found meeting these conditions    Showing future appointments within next 150 days and meeting all other requirements         I spent 25 minutes with patient today in which greater than 50% of the time was spent in counseling/coordination of care regarding HIV    It was my intent to perform this visit via video technology but the patient was not able to do a video connection so the visit was completed via audio telephone only

## 2022-06-30 ENCOUNTER — OFFICE VISIT (OUTPATIENT)
Dept: INFECTIOUS DISEASES | Facility: CLINIC | Age: 32
End: 2022-06-30
Payer: COMMERCIAL

## 2022-06-30 DIAGNOSIS — R73.03 PREDIABETES: ICD-10-CM

## 2022-06-30 DIAGNOSIS — B20 HIV DISEASE (HCC): Primary | ICD-10-CM

## 2022-06-30 DIAGNOSIS — R31.29 MICROHEMATURIA: ICD-10-CM

## 2022-06-30 DIAGNOSIS — E66.01 MORBID OBESITY WITH BMI OF 40.0-44.9, ADULT (HCC): ICD-10-CM

## 2022-06-30 DIAGNOSIS — I10 BENIGN ESSENTIAL HYPERTENSION: ICD-10-CM

## 2022-06-30 PROCEDURE — 99214 OFFICE O/P EST MOD 30 MIN: CPT | Performed by: INTERNAL MEDICINE

## 2022-06-30 RX ORDER — FLUOXETINE HYDROCHLORIDE 20 MG/1
CAPSULE ORAL
COMMUNITY
Start: 2022-06-14 | End: 2023-02-13 | Stop reason: HOSPADM

## 2022-06-30 NOTE — PATIENT INSTRUCTIONS
Please continue to take medication as prescribed  Please have labs completed in 5 months  Please follow up in 6 months

## 2022-07-20 DIAGNOSIS — N52.9 ERECTILE DYSFUNCTION, UNSPECIFIED ERECTILE DYSFUNCTION TYPE: ICD-10-CM

## 2022-07-20 RX ORDER — TADALAFIL 20 MG/1
20 TABLET ORAL DAILY PRN
Qty: 10 TABLET | Refills: 0 | Status: SHIPPED | OUTPATIENT
Start: 2022-07-20

## 2022-07-27 ENCOUNTER — TELEPHONE (OUTPATIENT)
Dept: INFECTIOUS DISEASES | Facility: CLINIC | Age: 32
End: 2022-07-27

## 2022-07-27 NOTE — TELEPHONE ENCOUNTER
Patient calls to see if we have intention on getting monkey pox vaccine  He is concerned because he is in the high risk category and he is non monogomous with his partner  I did inform him that we do not have it at this time, and unsure whether there is discussion to purchase it  He states that he thinks [facility] has these and will call them in the interim

## 2022-07-31 LAB
ALBUMIN SERPL-MCNC: 4.8 G/DL (ref 4–5)
ALBUMIN/GLOB SERPL: 1.6 {RATIO} (ref 1.2–2.2)
ALP SERPL-CCNC: 45 IU/L (ref 44–121)
ALT SERPL-CCNC: 38 IU/L (ref 0–44)
AST SERPL-CCNC: 24 IU/L (ref 0–40)
BASOPHILS # BLD AUTO: 0.1 X10E3/UL (ref 0–0.2)
BASOPHILS NFR BLD AUTO: 1 %
BILIRUB SERPL-MCNC: 0.4 MG/DL (ref 0–1.2)
BUN SERPL-MCNC: 15 MG/DL (ref 6–20)
BUN/CREAT SERPL: 14 (ref 9–20)
CALCIUM SERPL-MCNC: 9.9 MG/DL (ref 8.7–10.2)
CD3+CD4+ CELLS # BLD: 1178 /UL (ref 359–1519)
CD3+CD4+ CELLS NFR BLD: 45.3 % (ref 30.8–58.5)
CHLORIDE SERPL-SCNC: 102 MMOL/L (ref 96–106)
CO2 SERPL-SCNC: 22 MMOL/L (ref 20–29)
CREAT SERPL-MCNC: 1.11 MG/DL (ref 0.76–1.27)
EGFR: 90 ML/MIN/1.73
EOSINOPHIL # BLD AUTO: 0.1 X10E3/UL (ref 0–0.4)
EOSINOPHIL NFR BLD AUTO: 1 %
ERYTHROCYTE [DISTWIDTH] IN BLOOD BY AUTOMATED COUNT: 12.7 % (ref 11.6–15.4)
GLOBULIN SER-MCNC: 3 G/DL (ref 1.5–4.5)
GLUCOSE SERPL-MCNC: 110 MG/DL (ref 65–99)
HCT VFR BLD AUTO: 44.5 % (ref 37.5–51)
HGB BLD-MCNC: 14.9 G/DL (ref 13–17.7)
HIV1 PROVIR DNA RT + PR + IN MUT DET SEQ: NORMAL
HIV1 PROVIRAL DNA GENTYP BLD MC NAR: NORMAL
HIV1 RNA # SERPL NAA+PROBE: <20 COPIES/ML
HIV1 RNA SERPL NAA+PROBE-LOG#: NORMAL LOG10COPY/ML
IMM GRANULOCYTES # BLD: 0.2 X10E3/UL (ref 0–0.1)
IMM GRANULOCYTES NFR BLD: 2 %
LYMPHOCYTES # BLD AUTO: 2.6 X10E3/UL (ref 0.7–3.1)
LYMPHOCYTES NFR BLD AUTO: 29 %
Lab: NORMAL
MCH RBC QN AUTO: 30 PG (ref 26.6–33)
MCHC RBC AUTO-ENTMCNC: 33.5 G/DL (ref 31.5–35.7)
MCV RBC AUTO: 90 FL (ref 79–97)
MONOCYTES # BLD AUTO: 0.5 X10E3/UL (ref 0.1–0.9)
MONOCYTES NFR BLD AUTO: 6 %
NEUTROPHILS # BLD AUTO: 5.6 X10E3/UL (ref 1.4–7)
NEUTROPHILS NFR BLD AUTO: 61 %
PLATELET # BLD AUTO: 358 X10E3/UL (ref 150–450)
POTASSIUM SERPL-SCNC: 4.8 MMOL/L (ref 3.5–5.2)
PROT SERPL-MCNC: 7.8 G/DL (ref 6–8.5)
RBC # BLD AUTO: 4.97 X10E6/UL (ref 4.14–5.8)
SODIUM SERPL-SCNC: 138 MMOL/L (ref 134–144)
SPECIMEN CONDITION: NORMAL
WBC # BLD AUTO: 9.1 X10E3/UL (ref 3.4–10.8)

## 2022-08-05 ENCOUNTER — OFFICE VISIT (OUTPATIENT)
Dept: ENDOCRINOLOGY | Facility: CLINIC | Age: 32
End: 2022-08-05
Payer: COMMERCIAL

## 2022-08-05 VITALS
SYSTOLIC BLOOD PRESSURE: 128 MMHG | HEART RATE: 96 BPM | WEIGHT: 315 LBS | BODY MASS INDEX: 44.1 KG/M2 | HEIGHT: 71 IN | DIASTOLIC BLOOD PRESSURE: 82 MMHG

## 2022-08-05 DIAGNOSIS — K76.0 HEPATIC STEATOSIS: ICD-10-CM

## 2022-08-05 DIAGNOSIS — E78.5 HYPERLIPIDEMIA, UNSPECIFIED HYPERLIPIDEMIA TYPE: ICD-10-CM

## 2022-08-05 DIAGNOSIS — E55.9 VITAMIN D DEFICIENCY: ICD-10-CM

## 2022-08-05 DIAGNOSIS — E66.01 MORBID OBESITY WITH BMI OF 40.0-44.9, ADULT (HCC): ICD-10-CM

## 2022-08-05 DIAGNOSIS — R73.01 IMPAIRED FASTING GLUCOSE: Primary | ICD-10-CM

## 2022-08-05 PROCEDURE — 99214 OFFICE O/P EST MOD 30 MIN: CPT | Performed by: INTERNAL MEDICINE

## 2022-08-05 NOTE — PROGRESS NOTES
Asya Martinez 28 y o  male MRN: [id number]    Encounter: [id number]      Assessment/Plan     Problem List Items Addressed This Visit        Digestive    Hepatic steatosis       Endocrine    Impaired fasting glucose - Primary     For now wants to focus on dietary and lifestyle modifications and weight loss rather than pharmacological therapy  Will repeat labs in the next 6 months         Relevant Orders    HEMOGLOBIN A1C W/ EAG ESTIMATION Lab Collect       Other    Hyperlipidemia     Continue fenofibrate-focus on dietary modifications  Had not tolerated statins in the past 2 to myalgias-consider low potency statins in the future         Relevant Orders    Lipid Panel with Direct LDL reflex Lab Collect    TSH, 3rd generation Lab Collect    T4, free Lab Collect    Morbid obesity with BMI of 40 0-44 9, adult ([facility])    Relevant Orders    Comprehensive metabolic panel Lab Collect    Vitamin D deficiency     Continue supplementations             CC: pre Diabetes    History of Present Illness     HPI:  80-year-old male with prediabetes, dyslipidemia, vitamin-D deficiency and obesity seen in follow-up       Was on GLP-1 for about a year - lost about 60 lbs altogether and has gained it back  Stopped due to episodes of syncope and depression      For vitamin-D deficiency he is currently on Vitamin D3 5000 iu mon-Friday and 10,000 on the weekend      Review of Systems   Gastrointestinal: Negative for constipation and diarrhea  Musculoskeletal: Positive for myalgias  Negative for gait problem  Psychiatric/Behavioral: Negative for sleep disturbance         Historical Information   Past Medical History:   Diagnosis Date    Abnormal liver function tests     ADD (attention deficit disorder)     Asthma     Benign essential hypertension     Depression     Encounter for screening examination for sexually transmitted disease     [facility]     Hypercholesteremia     Microhematuria  Morbid obesity ([address] )     Seasonal allergies      Past Surgical History:   Procedure Laterality Date    TONSILLECTOMY       Social History   Social History     Substance and Sexual Activity   Alcohol Use Yes    Comment: 1 drink per month     Social History     Substance and Sexual Activity   Drug Use Yes    Frequency: 7 0 times per week    Types: Marijuana     Social History     Tobacco Use   Smoking Status Former Smoker    Packs/day: 0 50    Years: 10 00    Pack years: 5 00    Types: Cigarettes    Quit date: 2019    Years since quittin 9   Smokeless Tobacco Never Used     Family History:   Family History   Problem Relation Age of Onset    Hyperlipidemia Mother     Diabetes Father     Hyperlipidemia Father     Hypertension Father     Heart disease Father     Diabetes type II Father     Diabetes Paternal Aunt     Diabetes Paternal Uncle     Cancer Maternal Grandfather         prostate    Diabetes Paternal Grandmother     Heart disease Paternal Grandmother        Meds/Allergies   Current Outpatient Medications   Medication Sig Dispense Refill    albuterol (PROVENTIL HFA,VENTOLIN HFA) 90 mcg/act inhaler Inhale 1-2 puffs every 6 (six) hours as needed for wheezing       Biktarvy -25 MG tablet Take 1 tablet by mouth daily with breakfast  90 tablet 0    buPROPion (WELLBUTRIN XL) 300 mg 24 hr tablet Take 300 mg by mouth daily      Cholecalciferol (Vitamin D3) 125 MCG (5000 UT) CAPS 1 cap Mon-Fri, 2 caps sat/sun 90 capsule 3    fenofibrate (TRICOR) 145 mg tablet Take 1 tablet (145 mg total) by mouth daily 90 tablet 3    FLUoxetine (PROzac) 20 mg capsule       indomethacin (INDOCIN SR) 75 mg CR capsule Take 75 mg by mouth as needed      levocetirizine (XYZAL) 5 MG tablet Take 1 tablet by mouth daily      lisdexamfetamine (VYVANSE) 50 MG capsule Take 1 capsule by mouth every morning       risperiDONE (RisperDAL) 0 25 mg tablet Take 0 25 mg by mouth daily      risperiDONE (RisperDAL) 1 mg tablet 1 tab daily      tadalafil (CIALIS) 20 MG tablet Take 1 tablet (20 mg total) by mouth daily as needed for erectile dysfunction 10 tablet 0     No current facility-administered medications for this visit  No Known Allergies    Objective   Vitals: Blood pressure 128/82, pulse 96, height 5' 10 5" (1 791 m), weight (!) 146 kg (321 lb 2 oz)  Physical Exam  Vitals reviewed  Constitutional:       Appearance: Normal appearance  He is obese  He is not ill-appearing or diaphoretic  HENT:      Head: Normocephalic and atraumatic  Eyes:      General: No scleral icterus  Extraocular Movements: Extraocular movements intact  Cardiovascular:      Rate and Rhythm: Normal rate and regular rhythm  Heart sounds: Normal heart sounds  No murmur heard  Pulmonary:      Effort: Pulmonary effort is normal  No respiratory distress  Breath sounds: Normal breath sounds  No wheezing  Abdominal:      General: There is no distension  Palpations: Abdomen is soft  Tenderness: There is no abdominal tenderness  Musculoskeletal:      Cervical back: Neck supple  Right lower leg: No edema  Left lower leg: No edema  Lymphadenopathy:      Cervical: No cervical adenopathy  Skin:     General: Skin is warm and dry  Neurological:      General: No focal deficit present  Mental Status: He is alert and oriented to person, place, and time  Psychiatric:         Mood and Affect: Mood normal          Behavior: Behavior normal          Thought Content: Thought content normal          Judgment: Judgment normal          The history was obtained from the review of the chart, patient      Lab Results:   Lab Results   Component Value Date/Time    Hemoglobin A1C 5 4 06/24/2022 10:11 AM    White Blood Cell Count 9 1 06/24/2022 10:09 AM    White Blood Cell Count 10 0 01/06/2022 09:18 AM    Hemoglobin 14 9 06/24/2022 10:09 AM    Hemoglobin 14 1 01/06/2022 09:18 AM    HCT 44 5 06/24/2022 10:09 AM    HCT 41 9 01/06/2022 09:18 AM    MCV 90 06/24/2022 10:09 AM    MCV 90 01/06/2022 09:18 AM    Platelet Count 343 91/18/9033 10:09 AM    Platelet Count 242 07/00/1107 09:18 AM    BUN 15 06/24/2022 10:11 AM    BUN 15 06/24/2022 10:09 AM    BUN 13 01/19/2022 08:58 AM    Potassium 4 7 06/24/2022 10:11 AM    Potassium 4 8 06/24/2022 10:09 AM    Potassium 4 8 01/19/2022 08:58 AM    Chloride 103 06/24/2022 10:11 AM    Chloride 102 06/24/2022 10:09 AM    Chloride 101 01/19/2022 08:58 AM    CO2 21 06/24/2022 10:11 AM    CO2 22 06/24/2022 10:09 AM    CO2 19 (L) 01/19/2022 08:58 AM    Creatinine 1 07 06/24/2022 10:11 AM    Creatinine 1 11 06/24/2022 10:09 AM    Creatinine 0 90 01/19/2022 08:58 AM    AST 27 06/24/2022 10:11 AM    AST 24 06/24/2022 10:09 AM    AST 24 01/19/2022 08:58 AM    ALT 37 06/24/2022 10:11 AM    ALT 38 06/24/2022 10:09 AM    ALT 35 01/19/2022 08:58 AM    Albumin 4 6 06/24/2022 10:11 AM    Albumin 4 8 06/24/2022 10:09 AM    Albumin 4 8 01/19/2022 08:58 AM    Globulin, Total 2 9 06/24/2022 10:11 AM    Globulin, Total 3 0 06/24/2022 10:09 AM    Globulin, Total 3 1 01/19/2022 08:58 AM    HDL 33 (L) 06/24/2022 10:11 AM    Triglycerides 359 (H) 06/24/2022 10:11 AM         Portions of the record may have been created with voice recognition software  Occasional wrong word or "sound a like" substitutions may have occurred due to the inherent limitations of voice recognition software  Read the chart carefully and recognize, using context, where substitutions have occurred

## 2022-08-08 NOTE — ASSESSMENT & PLAN NOTE
For now wants to focus on dietary and lifestyle modifications and weight loss rather than pharmacological therapy    Will repeat labs in the next 6 months

## 2022-08-08 NOTE — ASSESSMENT & PLAN NOTE
Continue fenofibrate-focus on dietary modifications  Had not tolerated statins in the past 2 to myalgias-consider low potency statins in the future

## 2022-08-21 DIAGNOSIS — B20 HIV DISEASE (HCC): ICD-10-CM

## 2022-08-21 RX ORDER — BICTEGRAVIR SODIUM, EMTRICITABINE, AND TENOFOVIR ALAFENAMIDE FUMARATE 50; 200; 25 MG/1; MG/1; MG/1
TABLET ORAL
Qty: 90 TABLET | Refills: 0 | Status: SHIPPED | OUTPATIENT
Start: 2022-08-21

## 2022-10-31 DIAGNOSIS — B20 HIV DISEASE (HCC): ICD-10-CM

## 2022-10-31 RX ORDER — BICTEGRAVIR SODIUM, EMTRICITABINE, AND TENOFOVIR ALAFENAMIDE FUMARATE 50; 200; 25 MG/1; MG/1; MG/1
1 TABLET ORAL
Qty: 90 TABLET | Refills: 0 | Status: SHIPPED | OUTPATIENT
Start: 2022-10-31

## 2022-11-18 DIAGNOSIS — N52.9 ERECTILE DYSFUNCTION, UNSPECIFIED ERECTILE DYSFUNCTION TYPE: ICD-10-CM

## 2022-11-18 RX ORDER — TADALAFIL 20 MG/1
20 TABLET ORAL DAILY PRN
Qty: 10 TABLET | Refills: 0 | Status: SHIPPED | OUTPATIENT
Start: 2022-11-18

## 2022-12-13 ENCOUNTER — TELEPHONE (OUTPATIENT)
Dept: INFECTIOUS DISEASES | Facility: CLINIC | Age: 32
End: 2022-12-13

## 2022-12-13 LAB
ALBUMIN SERPL-MCNC: 4.7 G/DL (ref 4–5)
ALBUMIN/GLOB SERPL: 1.5 {RATIO} (ref 1.2–2.2)
ALP SERPL-CCNC: 44 IU/L (ref 44–121)
ALT SERPL-CCNC: 30 IU/L (ref 0–44)
AST SERPL-CCNC: 19 IU/L (ref 0–40)
BASOPHILS # BLD AUTO: 0.1 X10E3/UL (ref 0–0.2)
BASOPHILS NFR BLD AUTO: 1 %
BILIRUB SERPL-MCNC: 0.8 MG/DL (ref 0–1.2)
BUN SERPL-MCNC: 14 MG/DL (ref 6–20)
BUN/CREAT SERPL: 14 (ref 9–20)
CALCIUM SERPL-MCNC: 9.7 MG/DL (ref 8.7–10.2)
CD3+CD4+ CELLS # BLD: 1370 /UL (ref 359–1519)
CD3+CD4+ CELLS NFR BLD: 44.2 % (ref 30.8–58.5)
CHLORIDE SERPL-SCNC: 101 MMOL/L (ref 96–106)
CO2 SERPL-SCNC: 23 MMOL/L (ref 20–29)
CREAT SERPL-MCNC: 1.01 MG/DL (ref 0.76–1.27)
EGFR: 101 ML/MIN/1.73
EOSINOPHIL # BLD AUTO: 0.2 X10E3/UL (ref 0–0.4)
EOSINOPHIL NFR BLD AUTO: 2 %
ERYTHROCYTE [DISTWIDTH] IN BLOOD BY AUTOMATED COUNT: 12.8 % (ref 11.6–15.4)
GLOBULIN SER-MCNC: 3.1 G/DL (ref 1.5–4.5)
GLUCOSE SERPL-MCNC: 117 MG/DL (ref 70–99)
HCT VFR BLD AUTO: 42.7 % (ref 37.5–51)
HGB BLD-MCNC: 14.3 G/DL (ref 13–17.7)
HGB FRACT BLD-IMP: ABNORMAL
HIV 1+2 AB+HIV1 P24 AG SERPL QL IA: NORMAL
HIV1 AB SERPL QL IA: REACTIVE
IMM GRANULOCYTES # BLD: 0.2 X10E3/UL (ref 0–0.1)
IMM GRANULOCYTES NFR BLD: 2 %
LYMPHOCYTES # BLD AUTO: 3.1 X10E3/UL (ref 0.7–3.1)
LYMPHOCYTES NFR BLD AUTO: 29 %
MCH RBC QN AUTO: 29.2 PG (ref 26.6–33)
MCHC RBC AUTO-ENTMCNC: 33.5 G/DL (ref 31.5–35.7)
MCV RBC AUTO: 87 FL (ref 79–97)
MONOCYTES # BLD AUTO: 0.6 X10E3/UL (ref 0.1–0.9)
MONOCYTES NFR BLD AUTO: 5 %
NEUTROPHILS # BLD AUTO: 6.7 X10E3/UL (ref 1.4–7)
NEUTROPHILS NFR BLD AUTO: 61 %
PLATELET # BLD AUTO: 384 X10E3/UL (ref 150–450)
POTASSIUM SERPL-SCNC: 4.9 MMOL/L (ref 3.5–5.2)
PROT SERPL-MCNC: 7.8 G/DL (ref 6–8.5)
RBC # BLD AUTO: 4.89 X10E6/UL (ref 4.14–5.8)
SL AMB HIV 2 AB: NON REACTIVE
SODIUM SERPL-SCNC: 138 MMOL/L (ref 134–144)
WBC # BLD AUTO: 10.9 X10E3/UL (ref 3.4–10.8)

## 2022-12-13 NOTE — TELEPHONE ENCOUNTER
Contacted [facility] ([place]) to let them know that HIV RNA was not drawn  They cannot add it as it's a frozen specimen  They will call patient to come back to get patient to come back

## 2022-12-14 NOTE — PROGRESS NOTES
Progress Note - Infectious Disease   An Hernández 28 y o  male MRN: [id number]  Unit/Bed#:  Encounter: [id number]      Impression/Plan:  1   HIV-doing well on Biktarvy with a CD4 count of greater than 1000    Will check viral load now, and recheck labs in 5 months and follow up in 6 months   Would consider changing ART to assist with weight loss if the patient continues to struggle with losing weight     2   Morbid Obesity-continue to monitor and stressed the importance of weight loss      3   Hypertension-asymptomatic at this time  Unable to assess when this virtual visit     4  Microhematuria-recurrent  Renal ultrasound negative except with moderate postvoid residual   Follow-up with Urology     5  Liver function test abnormalities-now normalized with weight loss  Suspect steatohepatitis   Ultrasound consistent with hepatic steatosis  Will continue monitor LFTs   Stressed importance of ongoing weight loss     6  Pre diabetes-hemoglobin A1c of only 5 2 on repeat   Follow-up with endocrinology     7  Hypertriglyceridemia- Follow-up with endocrinology   Remains on fenofibrate   A bit worse since the last visit  6  HCM-influenza vaccination today      Patient was provided medication, adherence and prevention education    Subjective:  Routine follow-up for HIV  Patient claims 100% adherence with Biktarvy  Patient denies any notable side effects  Overall the feeling well  The patient denies any fever chills or sweats, denies any nausea vomiting or diarrhea, denies any cough or shortness of breath  ROS:  A complete review of systems is negative other than that noted above in the subjective    Followup portions patient history reviewed and updated as:   Allergies, current medications, past medical history, past social history, past surgical history, and the problem list    Objective:  Vitals:  Vitals:    12/15/22 1404   BP: 130/82   Pulse: 73   Resp: 18   Temp: (!) 97 3 °F (36 3 °C)   SpO2: 96%   Weight: (!) 149 kg (328 lb)   Height: 5' 10" (1 778 m)       Physical Exam:   General Appearance:  Alert, interactive, appearing well,  nontoxic, no acute distress  Neck:   Supple without lymphadenopathy, no thyromegaly or masses   Throat: Oropharynx moist without lesions  Lungs:   Clear to auscultation bilaterally; no wheezes, rhonchi or rales; respirations unlabored   Heart:  RRR; no murmur, rub or gallop   Abdomen:   Soft, non-tender, non-distended, positive bowel sounds  Extremities: No clubbing, cyanosis or edema   Skin: No new rashes or lesions  No draining wounds noted  Labs, Imaging, & Other studies:   All pertinent labs and imaging studies were personally reviewed    Lab Results   Component Value Date     11/06/2015    K 4 9 12/12/2022     12/12/2022    CO2 23 12/12/2022    ANIONGAP 10 11/06/2015    BUN 14 12/12/2022    CREATININE 1 01 12/12/2022    GLUCOSE 93 11/06/2015    CALCIUM 9 0 02/22/2016    AST 19 12/12/2022    ALT 30 12/12/2022    ALKPHOS 60 02/22/2016    PROT 8 3 (H) 11/06/2015    BILITOT 0 65 11/06/2015    EGFR 101 12/12/2022     Lab Results   Component Value Date    WBC 10 9 (H) 12/12/2022    HGB 14 3 12/12/2022    HCT 42 7 12/12/2022    MCV 87 12/12/2022     12/12/2022     Lab Results   Component Value Date    HEPCAB Negative 09/13/2012     Lab Results   Component Value Date    HEPCAB Negative 09/13/2012     Lab Results   Component Value Date    RPR Non Reactive 12/07/2020     CD4 ABS   Date/Time Value Ref Range Status   12/12/2022 09:54 AM 1,370 359 - 1,519 /uL Final     HIV-1 RNA by PCR, Qn   Date/Time Value Ref Range Status   06/24/2022 10:09 AM <20 copies/mL Final     Comment:     HIV-1 RNA detected  The reportable range for this assay is 20 to 10,000,000  copies HIV-1 RNA/mL               Current Outpatient Medications:   •  albuterol (PROVENTIL HFA,VENTOLIN HFA) 90 mcg/act inhaler, Inhale 1-2 puffs every 6 (six) hours as needed for wheezing , Disp: , Rfl:   • bictegravir-emtricitab-tenofovir alafenamide (Biktarvy) -25 MG tablet, Take 1 tablet by mouth daily with breakfast, Disp: 90 tablet, Rfl: 0  •  buPROPion (WELLBUTRIN XL) 300 mg 24 hr tablet, Take 300 mg by mouth daily, Disp: , Rfl:   •  Cholecalciferol (Vitamin D3) 125 MCG (5000 UT) CAPS, 1 cap Mon-Fri, 2 caps sat/sun, Disp: 90 capsule, Rfl: 3  •  fenofibrate (TRICOR) 145 mg tablet, Take 1 tablet (145 mg total) by mouth daily, Disp: 90 tablet, Rfl: 3  •  FLUoxetine (PROzac) 20 mg capsule, , Disp: , Rfl:   •  indomethacin (INDOCIN SR) 75 mg CR capsule, Take 75 mg by mouth as needed, Disp: , Rfl:   •  levocetirizine (XYZAL) 5 MG tablet, Take 1 tablet by mouth daily, Disp: , Rfl:   •  lisdexamfetamine (VYVANSE) 50 MG capsule, Take 1 capsule by mouth every morning , Disp: , Rfl:   •  risperiDONE (RisperDAL) 0 25 mg tablet, Take 0 25 mg by mouth daily, Disp: , Rfl:   •  risperiDONE (RisperDAL) 1 mg tablet, 1 tab daily, Disp: , Rfl:   •  tadalafil (CIALIS) 20 MG tablet, Take 1 tablet (20 mg total) by mouth daily as needed for erectile dysfunction, Disp: 10 tablet, Rfl: 0

## 2022-12-15 ENCOUNTER — OFFICE VISIT (OUTPATIENT)
Dept: INFECTIOUS DISEASES | Facility: CLINIC | Age: 32
End: 2022-12-15

## 2022-12-15 VITALS
DIASTOLIC BLOOD PRESSURE: 82 MMHG | WEIGHT: 315 LBS | OXYGEN SATURATION: 96 % | HEIGHT: 70 IN | TEMPERATURE: 97.3 F | SYSTOLIC BLOOD PRESSURE: 130 MMHG | RESPIRATION RATE: 18 BRPM | HEART RATE: 73 BPM | BODY MASS INDEX: 45.1 KG/M2

## 2022-12-15 DIAGNOSIS — R73.01 IMPAIRED FASTING GLUCOSE: ICD-10-CM

## 2022-12-15 DIAGNOSIS — B20 HIV DISEASE (HCC): Primary | ICD-10-CM

## 2022-12-15 DIAGNOSIS — E66.01 MORBID OBESITY WITH BMI OF 40.0-44.9, ADULT (HCC): ICD-10-CM

## 2022-12-15 DIAGNOSIS — K76.0 HEPATIC STEATOSIS: ICD-10-CM

## 2022-12-15 DIAGNOSIS — R73.03 PREDIABETES: ICD-10-CM

## 2022-12-15 DIAGNOSIS — Z23 NEEDS FLU SHOT: ICD-10-CM

## 2022-12-15 DIAGNOSIS — I10 BENIGN ESSENTIAL HYPERTENSION: ICD-10-CM

## 2022-12-15 DIAGNOSIS — E78.1 HYPERTRIGLYCERIDEMIA: ICD-10-CM

## 2022-12-15 RX ORDER — ARIPIPRAZOLE 2 MG/1
TABLET ORAL
COMMUNITY
Start: 2022-09-28

## 2022-12-15 RX ORDER — BICTEGRAVIR SODIUM, EMTRICITABINE, AND TENOFOVIR ALAFENAMIDE FUMARATE 50; 200; 25 MG/1; MG/1; MG/1
1 TABLET ORAL
Qty: 90 TABLET | Refills: 0 | Status: SHIPPED | OUTPATIENT
Start: 2022-12-15

## 2022-12-15 RX ORDER — ARIPIPRAZOLE 2 MG/1
TABLET ORAL
COMMUNITY
Start: 2022-11-03

## 2022-12-15 NOTE — PATIENT INSTRUCTIONS
Continue your medication as ordered  Refills have been sent to [facility] pill pack  Labs in 5 months  Follow up with us in 6 months, or prn sooner

## 2022-12-25 LAB
HIV1 RNA # SERPL NAA+PROBE: 60 COPIES/ML
HIV1 RNA SERPL NAA+PROBE-LOG#: 1.78 LOG10COPY/ML

## 2023-01-05 DIAGNOSIS — N52.9 ERECTILE DYSFUNCTION, UNSPECIFIED ERECTILE DYSFUNCTION TYPE: ICD-10-CM

## 2023-01-05 RX ORDER — TADALAFIL 20 MG/1
20 TABLET ORAL DAILY PRN
Qty: 10 TABLET | Refills: 0 | Status: SHIPPED | OUTPATIENT
Start: 2023-01-05

## 2023-01-20 ENCOUNTER — TELEPHONE (OUTPATIENT)
Dept: INFECTIOUS DISEASES | Facility: CLINIC | Age: 33
End: 2023-01-20

## 2023-01-20 NOTE — TELEPHONE ENCOUNTER
Contacted patient per Dr Shanon Nguyen and verified no missed doses  Patient separates his vitamin D by at least four hours  He was taking his abilify prior to this test result in the AM, but has changed this since observing lab result  He is comfortable to continue with plan as is, and f/u in six months

## 2023-01-20 NOTE — TELEPHONE ENCOUNTER
Patients call our office as his RNA came back with 60 copies  He has not had any lapse in his ART  He wants to know if you want him to repeat it sooner than the six months?

## 2023-02-09 LAB
ALBUMIN SERPL-MCNC: 4.5 G/DL (ref 4–5)
ALBUMIN/GLOB SERPL: 1.5 {RATIO} (ref 1.2–2.2)
ALP SERPL-CCNC: 45 IU/L (ref 44–121)
ALT SERPL-CCNC: 44 IU/L (ref 0–44)
AST SERPL-CCNC: 28 IU/L (ref 0–40)
BILIRUB SERPL-MCNC: 0.6 MG/DL (ref 0–1.2)
BUN SERPL-MCNC: 13 MG/DL (ref 6–20)
BUN/CREAT SERPL: 13 (ref 9–20)
CALCIUM SERPL-MCNC: 9.6 MG/DL (ref 8.7–10.2)
CHLORIDE SERPL-SCNC: 106 MMOL/L (ref 96–106)
CHOLEST SERPL-MCNC: 237 MG/DL (ref 100–199)
CO2 SERPL-SCNC: 20 MMOL/L (ref 20–29)
CREAT SERPL-MCNC: 0.98 MG/DL (ref 0.76–1.27)
EGFR: 105 ML/MIN/1.73
EST. AVERAGE GLUCOSE BLD GHB EST-MCNC: 117 MG/DL
GLOBULIN SER-MCNC: 3.1 G/DL (ref 1.5–4.5)
GLUCOSE SERPL-MCNC: 114 MG/DL (ref 70–99)
HBA1C MFR BLD: 5.7 % (ref 4.8–5.6)
HDLC SERPL-MCNC: 34 MG/DL
LDLC SERPL CALC-MCNC: 135 MG/DL (ref 0–99)
POTASSIUM SERPL-SCNC: 4.9 MMOL/L (ref 3.5–5.2)
PROT SERPL-MCNC: 7.6 G/DL (ref 6–8.5)
SODIUM SERPL-SCNC: 144 MMOL/L (ref 134–144)
T4 FREE SERPL-MCNC: 1.43 NG/DL (ref 0.82–1.77)
TRIGL SERPL-MCNC: 376 MG/DL (ref 0–149)
TSH SERPL DL<=0.005 MIU/L-ACNC: 2.88 UIU/ML (ref 0.45–4.5)

## 2023-02-12 DIAGNOSIS — B20 HIV DISEASE (HCC): ICD-10-CM

## 2023-02-12 DIAGNOSIS — E78.1 HYPERTRIGLYCERIDEMIA: ICD-10-CM

## 2023-02-12 RX ORDER — BICTEGRAVIR SODIUM, EMTRICITABINE, AND TENOFOVIR ALAFENAMIDE FUMARATE 50; 200; 25 MG/1; MG/1; MG/1
1 TABLET ORAL
Qty: 90 TABLET | Refills: 0 | Status: SHIPPED | OUTPATIENT
Start: 2023-02-12

## 2023-02-13 ENCOUNTER — OFFICE VISIT (OUTPATIENT)
Dept: ENDOCRINOLOGY | Facility: CLINIC | Age: 33
End: 2023-02-13

## 2023-02-13 VITALS
WEIGHT: 315 LBS | DIASTOLIC BLOOD PRESSURE: 88 MMHG | BODY MASS INDEX: 45.1 KG/M2 | SYSTOLIC BLOOD PRESSURE: 142 MMHG | HEART RATE: 82 BPM | HEIGHT: 70 IN

## 2023-02-13 DIAGNOSIS — E66.01 MORBID OBESITY WITH BMI OF 40.0-44.9, ADULT (HCC): Primary | ICD-10-CM

## 2023-02-13 DIAGNOSIS — E78.1 HYPERTRIGLYCERIDEMIA: ICD-10-CM

## 2023-02-13 DIAGNOSIS — R73.01 IMPAIRED FASTING GLUCOSE: ICD-10-CM

## 2023-02-13 DIAGNOSIS — E55.9 VITAMIN D DEFICIENCY: ICD-10-CM

## 2023-02-13 DIAGNOSIS — I10 BENIGN ESSENTIAL HYPERTENSION: ICD-10-CM

## 2023-02-13 RX ORDER — HYDROXYZINE PAMOATE 50 MG/1
50 CAPSULE ORAL
COMMUNITY
Start: 2023-02-05

## 2023-02-13 RX ORDER — FENOFIBRATE 145 MG/1
TABLET, COATED ORAL
Qty: 90 TABLET | Refills: 2 | Status: SHIPPED | OUTPATIENT
Start: 2023-02-13

## 2023-02-13 RX ORDER — PEN NEEDLE, DIABETIC 32 GX 1/4"
NEEDLE, DISPOSABLE MISCELLANEOUS
Qty: 100 EACH | Refills: 1 | Status: SHIPPED | OUTPATIENT
Start: 2023-02-13

## 2023-02-13 NOTE — PROGRESS NOTES
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  He continues to take fenofibrate for the high triglycerides  Has not tolerated statins or omega3    For Vitamin D Deficiency, he is taking Vit D 5,000 Mon-Fri, 10,000 units on the weekend           Patient Active Problem List   Diagnosis   • HIV disease ([place] [address] )   • Hepatic steatosis   • Morbid obesity with BMI of 40 0-44 9, adult ([place] [address] )   • Benign essential hypertension   • Microhematuria   • Folliculitis   • Cigarette nicotine dependence without complication   • Prediabetes   • Vitamin D deficiency   • Hypertriglyceridemia   • Constipation   • Depression   • Bipolar 2 disorder (HCC)   • Hyperlipidemia   • Impaired fasting glucose      Past Medical History:   Diagnosis Date   • Abnormal liver function tests    • ADD (attention deficit disorder)    • Asthma    • Benign essential hypertension    • Depression    • Encounter for screening examination for sexually transmitted disease    • HIV (human immunodeficiency virus infection) ([place] [address] )    • Hypercholesteremia    • Microhematuria    • Morbid obesity ([facility] )    • Seasonal allergies       Past Surgical History:   Procedure Laterality Date   • TONSILLECTOMY        Family History   Problem Relation Age of Onset   • Hyperlipidemia Mother    • Diabetes Father    • Hyperlipidemia Father    • Hypertension Father    • Heart disease Father    • Diabetes type II Father    • Diabetes Paternal Aunt    • Diabetes Paternal Uncle    • Cancer Maternal Grandfather         prostate   • Diabetes Paternal Grandmother    • Heart disease Paternal Grandmother      Social History     Tobacco Use   • Smoking status: Former     Packs/day: 0 50     Years: 10 00     Pack years: 5 00     Types: Cigarettes     Quit date: 9/1/2019     Years since quitting: 3 4   • Smokeless tobacco: Never   Substance Use Topics   • Alcohol use: Yes     Comment: 1 drink per month     No Known Allergies    Current Outpatient Medications:   •  albuterol (PROVENTIL HFA,VENTOLIN HFA) 90 mcg/act inhaler, Inhale 1-2 puffs every 6 (six) hours as needed for wheezing , Disp: , Rfl:   •  ARIPiprazole (ABILIFY) 2 mg tablet, , Disp: , Rfl:   •  Biktarvy -25 MG tablet, Take 1 tablet by mouth daily with breakfast , Disp: 90 tablet, Rfl: 0  •  buPROPion (WELLBUTRIN XL) 300 mg 24 hr tablet, Take 300 mg by mouth daily, Disp: , Rfl:   •  Cholecalciferol (Vitamin D3) 125 MCG (5000 UT) CAPS, 1 cap Mon-Fri, 2 caps sat/sun, Disp: 90 capsule, Rfl: 3  •  hydrOXYzine pamoate (VISTARIL) 50 mg capsule, Take 50 mg by mouth Daily at 2am, Disp: , Rfl:   •  indomethacin (INDOCIN SR) 75 mg CR capsule, Take 75 mg by mouth as needed, Disp: , Rfl:   •  Insulin Pen Needle (Novofine Pen Needle) 32G X 6 MM MISC, USE 1 DAILY WITH VICTOZA, Disp: 100 each, Rfl: 1  •  lisdexamfetamine (VYVANSE) 50 MG capsule, Take 1 capsule by mouth every morning, Disp: , Rfl:   •  tadalafil (CIALIS) 20 MG tablet, Take 1 tablet (20 mg total) by mouth daily as needed for erectile dysfunction, Disp: 10 tablet, Rfl: 0  •  fenofibrate (TRICOR) 145 mg tablet, Take 1 tablet by mouth once daily  , Disp: 90 tablet, Rfl: 2  •  metFORMIN (GLUCOPHAGE-XR) 500 mg 24 hr tablet, Titrate as directed to 4 tabs daily, Disp: 120 tablet, Rfl: 3    Review of Systems   Constitutional: Negative for activity change, appetite change and fatigue  HENT: Negative for sore throat, trouble swallowing and voice change  Eyes: Negative for visual disturbance     Respiratory: Negative for choking, chest tightness and shortness of breath  Cardiovascular: Negative for chest pain, palpitations and leg swelling  Gastrointestinal: Negative for abdominal pain, constipation and diarrhea  Endocrine: Negative for cold intolerance, heat intolerance, polydipsia, polyphagia and polyuria  Genitourinary: Negative for frequency  Musculoskeletal: Negative for arthralgias and myalgias  Skin: Negative for rash  Neurological: Negative for dizziness and syncope  Hematological: Negative for adenopathy  Psychiatric/Behavioral: Negative for sleep disturbance  All other systems reviewed and are negative  Physical Exam:  Body mass index is 47 51 kg/m²  /88 (BP Location: Left arm, Patient Position: Sitting, Cuff Size: Large)   Pulse 82   Ht 5' 10" (1 778 m)   Wt (!) 150 kg (331 lb 2 oz)   BMI 47 51 kg/m²    Wt Readings from Last 3 Encounters:   02/13/23 (!) 150 kg (331 lb 2 oz)   12/15/22 (!) 149 kg (328 lb)   08/05/22 (!) 146 kg (321 lb 2 oz)       Physical Exam  Vitals reviewed  Constitutional:       General: He is not in acute distress  Appearance: He is well-developed  HENT:      Head: Normocephalic and atraumatic  Eyes:      Conjunctiva/sclera: Conjunctivae normal       Pupils: Pupils are equal, round, and reactive to light  Neck:      Thyroid: No thyromegaly  Cardiovascular:      Rate and Rhythm: Normal rate and regular rhythm  Heart sounds: Normal heart sounds  No murmur heard  Pulmonary:      Effort: Pulmonary effort is normal  No respiratory distress  Breath sounds: Normal breath sounds  No wheezing or rales  Abdominal:      General: Bowel sounds are normal  There is no distension  Palpations: Abdomen is soft  Tenderness: There is no abdominal tenderness  Musculoskeletal:         General: Normal range of motion  Cervical back: Normal range of motion and neck supple  Lymphadenopathy:      Cervical: No cervical adenopathy     Skin:     General: Skin is warm and dry  Neurological:      Mental Status: He is alert and oriented to person, place, and time  Labs:       Lab Results   Component Value Date    CREATININE 0 98 02/08/2023    CREATININE 1 01 12/12/2022    CREATININE 1 07 06/24/2022    BUN 13 02/08/2023     11/06/2015    K 4 9 02/08/2023     02/08/2023    CO2 20 02/08/2023     eGFR   Date Value Ref Range Status   02/08/2023 105 >59 mL/min/1 73 Final   02/22/2016 >60 0 ml/min/1 73sq m Final       Lab Results   Component Value Date    CHOL 224 03/10/2015    HDL 34 (L) 02/08/2023    TRIG 376 (H) 02/08/2023       Lab Results   Component Value Date    ALT 44 02/08/2023    AST 28 02/08/2023    ALKPHOS 60 02/22/2016    BILITOT 0 65 11/06/2015       Lab Results   Component Value Date    FREET4 1 43 02/08/2023         Impression & Plan:    Problem List Items Addressed This Visit        Endocrine    Impaired fasting glucose     Continue to focus on lifestyle modification and weight loss  He is interested in medical therapy  He would like to avoid metformin if possible as his dad had significant GI side effects  He did well with GLP-1 in past but stopped Wegovy due to orthostatic hypotension but was on other meds at that time that may have had this side effect  Will try Victoza for now since this is shorter acting and we can titrate slowly and stop quickly if any side effects  Relevant Medications    Insulin Pen Needle (Novofine Pen Needle) 32G X 6 MM MISC    Other Relevant Orders    Hemoglobin A1C       Cardiovascular and Mediastinum    Benign essential hypertension     Not at goal today but typically well controlled  Continue to focus on weight loss and will monitor               Other    Morbid obesity with BMI of 40 0-44 9, adult ([facility] [address] ) - Primary    Relevant Medications    Insulin Pen Needle (Novofine Pen Needle) 32G X 6 MM MISC    Other Relevant Orders    Comprehensive metabolic panel    Lipid Panel with Direct LDL reflex    Vitamin D deficiency     Continue supplement         Hypertriglyceridemia     Continue fenofibrate and lifestyle modification and will monitor  He has not tolerated [id number]            Orders Placed This Encounter   Procedures   • Hemoglobin A1C     Standing Status:   Future     Standing Expiration Date:   2/13/2024   • Comprehensive metabolic panel     This is a patient instruction: Patient fasting for 8 hours or longer recommended  Standing Status:   Future     Standing Expiration Date:   2/13/2024   • Lipid Panel with Direct LDL reflex     This is a patient instruction: This test requires patient fasting for 10-12 hours or longer  Drinking of black coffee or black tea is acceptable  Standing Status:   Future     Standing Expiration Date:   2/13/2024       There are no Patient Instructions on file for this visit  Discussed with the patient and all questioned fully answered  He will call me if any problems arise  Follow-up appointment in 3 months       Counseled patient on diagnostic results, prognosis, risk and benefit of treatment options, instruction for management, importance of treatment compliance, Risk  factor reduction and impressions      Isaiah Guajardo PA-C

## 2023-02-15 ENCOUNTER — TELEPHONE (OUTPATIENT)
Dept: ENDOCRINOLOGY | Facility: CLINIC | Age: 33
End: 2023-02-15

## 2023-02-15 NOTE — TELEPHONE ENCOUNTER
Birgit Dela Cruz: [id number] - PA Case ID: [id number]  Need help? Call us at [phone]  Status   Sent to  [address] 18MG/3ML pen-injectors   Form    [employer] Electronic Prior Authorization Form 2017

## 2023-02-21 DIAGNOSIS — R73.01 IMPAIRED FASTING GLUCOSE: Primary | ICD-10-CM

## 2023-02-21 RX ORDER — METFORMIN HYDROCHLORIDE 500 MG/1
TABLET, EXTENDED RELEASE ORAL
Qty: 120 TABLET | Refills: 3 | Status: SHIPPED | OUTPATIENT
Start: 2023-02-21

## 2023-02-24 NOTE — ASSESSMENT & PLAN NOTE
Continue to focus on lifestyle modification and weight loss  He is interested in medical therapy  He would like to avoid metformin if possible as his dad had significant GI side effects  He did well with GLP-1 in past but stopped Wegovy due to orthostatic hypotension but was on other meds at that time that may have had this side effect  Will try Victoza for now since this is shorter acting and we can titrate slowly and stop quickly if any side effects

## 2023-03-01 NOTE — TELEPHONE ENCOUNTER
Deniedon February 24  We received a prior authorization request on behalf of [employer] for [address] [place] [address]  According to your Summary Plan Document (SPD), drugs used to manage weight are not covered under your plan   Please refer to page 33 of your SPD for additional informati Yes

## 2023-03-20 DIAGNOSIS — R73.03 PREDIABETES: ICD-10-CM

## 2023-03-20 DIAGNOSIS — K76.0 HEPATIC STEATOSIS: ICD-10-CM

## 2023-03-20 DIAGNOSIS — R73.01 IMPAIRED FASTING GLUCOSE: Primary | ICD-10-CM

## 2023-04-03 DIAGNOSIS — N52.9 ERECTILE DYSFUNCTION, UNSPECIFIED ERECTILE DYSFUNCTION TYPE: ICD-10-CM

## 2023-04-04 RX ORDER — TADALAFIL 20 MG/1
20 TABLET ORAL DAILY PRN
Qty: 10 TABLET | Refills: 0 | Status: SHIPPED | OUTPATIENT
Start: 2023-04-04

## 2023-04-04 NOTE — TELEPHONE ENCOUNTER
Patient has not been seen in almost 2 years    He needs to schedule a follow-up appointment if he would like us to continue to order his medications

## 2023-05-08 DIAGNOSIS — R73.01 IMPAIRED FASTING GLUCOSE: Primary | ICD-10-CM

## 2023-05-08 DIAGNOSIS — K76.0 HEPATIC STEATOSIS: ICD-10-CM

## 2023-05-08 DIAGNOSIS — R73.03 PREDIABETES: ICD-10-CM

## 2023-05-17 DIAGNOSIS — N52.9 ERECTILE DYSFUNCTION, UNSPECIFIED ERECTILE DYSFUNCTION TYPE: ICD-10-CM

## 2023-05-18 DIAGNOSIS — B20 HIV DISEASE (HCC): ICD-10-CM

## 2023-05-18 RX ORDER — TADALAFIL 20 MG/1
20 TABLET ORAL DAILY PRN
Qty: 10 TABLET | Refills: 0 | Status: CANCELLED | OUTPATIENT
Start: 2023-05-18

## 2023-05-18 RX ORDER — BICTEGRAVIR SODIUM, EMTRICITABINE, AND TENOFOVIR ALAFENAMIDE FUMARATE 50; 200; 25 MG/1; MG/1; MG/1
1 TABLET ORAL
Qty: 90 TABLET | Refills: 1 | Status: SHIPPED | OUTPATIENT
Start: 2023-05-18

## 2023-05-18 RX ORDER — BICTEGRAVIR SODIUM, EMTRICITABINE, AND TENOFOVIR ALAFENAMIDE FUMARATE 50; 200; 25 MG/1; MG/1; MG/1
1 TABLET ORAL
Qty: 90 TABLET | Refills: 0 | Status: SHIPPED | OUTPATIENT
Start: 2023-05-18 | End: 2023-05-18 | Stop reason: SDUPTHER

## 2023-05-22 ENCOUNTER — OFFICE VISIT (OUTPATIENT)
Dept: UROLOGY | Facility: AMBULATORY SURGERY CENTER | Age: 33
End: 2023-05-22

## 2023-05-22 VITALS
HEIGHT: 70 IN | OXYGEN SATURATION: 92 % | HEART RATE: 86 BPM | SYSTOLIC BLOOD PRESSURE: 150 MMHG | WEIGHT: 315 LBS | BODY MASS INDEX: 45.1 KG/M2 | DIASTOLIC BLOOD PRESSURE: 82 MMHG

## 2023-05-22 DIAGNOSIS — R35.0 URINARY FREQUENCY: Primary | ICD-10-CM

## 2023-05-22 DIAGNOSIS — N52.9 ERECTILE DYSFUNCTION, UNSPECIFIED ERECTILE DYSFUNCTION TYPE: ICD-10-CM

## 2023-05-22 LAB
POST-VOID RESIDUAL VOLUME, ML POC: 56 ML
SL AMB  POCT GLUCOSE, UA: ABNORMAL
SL AMB LEUKOCYTE ESTERASE,UA: ABNORMAL
SL AMB POCT BILIRUBIN,UA: ABNORMAL
SL AMB POCT BLOOD,UA: ABNORMAL
SL AMB POCT CLARITY,UA: CLEAR
SL AMB POCT COLOR,UA: YELLOW
SL AMB POCT KETONES,UA: ABNORMAL
SL AMB POCT NITRITE,UA: ABNORMAL
SL AMB POCT PH,UA: 8.5
SL AMB POCT SPECIFIC GRAVITY,UA: 1.01
SL AMB POCT URINE PROTEIN: ABNORMAL
SL AMB POCT UROBILINOGEN: 8

## 2023-05-22 RX ORDER — TADALAFIL 5 MG/1
5 TABLET ORAL DAILY PRN
Qty: 90 TABLET | Refills: 3 | Status: SHIPPED | OUTPATIENT
Start: 2023-05-22

## 2023-05-22 RX ORDER — TADALAFIL 20 MG/1
20 TABLET ORAL DAILY PRN
Qty: 20 TABLET | Refills: 3 | Status: SHIPPED | OUTPATIENT
Start: 2023-05-22

## 2023-05-22 NOTE — PATIENT INSTRUCTIONS
Tadalafil 5 mg daily, except on days you wish to perform sexually; you would then take a 20 mg tablet   Continue with Kegel/pelvic floor exercises   Maintain adequate hydration while avoiding bladder irritating foods/beverages   Call the office for concerns or questions

## 2023-05-22 NOTE — PROGRESS NOTES
"5/22/2023      Chief Complaint   Patient presents with   • Erectile Dysfunction     Assessment and Plan    35 y o  male managed by our office    1  Microscopic hematuria  ? asymptomatic  ?  most recent urinalysis with microscopy performed 06/02/2021 unremarkable -negative for microscopic blood     2  Overactive bladder symptoms  ?  maintain adequate hydration upwards to 40 oz of water intake per day  ?  we discussed the need to avoid bladder irritating foods and beverages  ?  if symptoms worsen or continue to be bothersome consider the use of anticholinergic agents-oxybutynin  ? Follow up in the office in 1 year     3  Erectile dysfunction  ?  continue Tadalafil- prescription refill provided     4  Family history prostate cancer  ? Maternal grandfather    History of Present Illness  Cindy Armas is a 35 y o  male here for follow up evaluation of  microscopic hematuria, overactive bladder symptoms and erectile dysfunction   Patient reports microscopic hematuria noted on a urine dip in his PCPs office and is referred to the office for further evaluation   Patient also complains of overactive bladder symptoms; he reports urinary frequency and urgency and occasional weak urinary stream   Patient does report occasional erectile dysfunction   Patient reports difficulty maintaining an erection suitable for sexual activity   Patient denies smoking and the use/abuse of illicit substances and alcohol   Patient reports a significant family history of benign prostatic hyperplasia in his father and prostate cancer in his grandfather      Past medical history includes HIV, hypertension, \"prediabetes\", asthma, ADD, depression      At previous office visit urinalysis with microscopy was ordered which resulted in negative findings including microscopic hematuria   Ultrasound of kidney and bladder performed which showed no hydronephrosis in otherwise an unremarkable evaluation          Review of Systems   Constitutional: " Negative for chills and fever  Respiratory: Negative for cough and shortness of breath  Cardiovascular: Negative for chest pain  Gastrointestinal: Negative for abdominal distention, abdominal pain, blood in stool, nausea and vomiting  Genitourinary: Positive for frequency  Negative for difficulty urinating, dysuria, enuresis, flank pain, hematuria and urgency  Skin: Negative for rash  Urinary Incontinence Screening    Flowsheet Row Most Recent Value   Urinary Incontinence    Urinary Incontinence? No   Incomplete emptying? Yes   Urinary frequency? Yes   Urinary urgency? Yes   Urinary hesitancy? No   Dysuria (painful difficult urination)? Yes   Nocturia (waking up to use the bathroom)? Yes   Straining (having to push to go)? No   Weak stream? No   Intermittent stream? Yes   Post void dribbling?  Yes          AUA SYMPTOM SCORE    Flowsheet Row Most Recent Value   AUA SYMPTOM SCORE    How often have you had a sensation of not emptying your bladder completely after you finished urinating? 3 (P)     How often have you had to urinate again less than two hours after you finished urinating? 4 (P)     How often have you found you stopped and started again several times when you urinate? 3 (P)     How often have you found it difficult to postpone urination? 2 (P)     How often have you had a weak urinary stream? 2 (P)     How often have you had to push or strain to begin urination? 2 (P)     How many times did you most typically get up to urinate from the time you went to bed at night until the time you got up in the morning? 1 (P)     Quality of Life: If you were to spend the rest of your life with your urinary condition just the way it is now, how would you feel about that? 4 (P)     AUA SYMPTOM SCORE 17 (P)              Past Medical History  Past Medical History:   Diagnosis Date   • Abnormal liver function tests    • ADD (attention deficit disorder)    • Asthma    • Benign essential hypertension    • Depression    • Encounter for screening examination for sexually transmitted disease    • HIV (human immunodeficiency virus infection) ([facility] )    • Hypercholesteremia    • Microhematuria    • Morbid obesity ([facility] )    • Seasonal allergies        Past Social History  Past Surgical History:   Procedure Laterality Date   • TONSILLECTOMY       Social History     Tobacco Use   Smoking Status Former   • Packs/day: 0 50   • Years: 10 00   • Pack years: 5 00   • Types: Cigarettes   • Quit date: 9/1/2019   • Years since quitting: 3 7   Smokeless Tobacco Never       Past Family History  Family History   Problem Relation Age of Onset   • Hyperlipidemia Mother    • Diabetes Father    • Hyperlipidemia Father    • Hypertension Father    • Heart disease Father    • Diabetes type II Father    • Diabetes Paternal Aunt    • Diabetes Paternal Uncle    • Cancer Maternal Grandfather         prostate   • Diabetes Paternal Grandmother    • Heart disease Paternal Grandmother        Past Social history  Social History     Socioeconomic History   • Marital status: /Civil Union     Spouse name: Not on file   • Number of children: Not on file   • Years of education: Not on file   • Highest education level: Not on file   Occupational History   • Not on file   Tobacco Use   • Smoking status: Former     Packs/day: 0 50     Years: 10 00     Pack years: 5 00     Types: Cigarettes     Quit date: 9/1/2019     Years since quitting: 3 7   • Smokeless tobacco: Never   Vaping Use   • Vaping Use: Former   • Substances: Nicotine, Flavoring   Substance and Sexual Activity   • Alcohol use: Yes     Comment: 1 drink per month   • Drug use: Yes     Frequency: 7 0 times per week     Types: Marijuana   • Sexual activity: Yes     Partners: Male     Birth control/protection: None   Other Topics Concern   • Not on file   Social History Narrative   • Not on file     Social Determinants of Health     Financial Resource Strain: Not on file   Food Insecurity: Not on "file   Transportation Needs: Not on file   Physical Activity: Not on file   Stress: Not on file   Social Connections: Not on file   Intimate Partner Violence: Not on file   Housing Stability: Not on file       Current Medications  Current Outpatient Medications   Medication Sig Dispense Refill   • albuterol (PROVENTIL HFA,VENTOLIN HFA) 90 mcg/act inhaler Inhale 1-2 puffs every 6 (six) hours as needed for wheezing      • ARIPiprazole (ABILIFY) 2 mg tablet      • bictegravir-emtricitab-tenofovir alafenamide (Biktarvy) -25 MG tablet Take 1 tablet by mouth daily with breakfast 90 tablet 1   • buPROPion (WELLBUTRIN XL) 300 mg 24 hr tablet Take 300 mg by mouth daily     • Cholecalciferol (Vitamin D3) 125 MCG (5000 UT) CAPS 1 cap Mon-Fri, 2 caps sat/sun 90 capsule 3   • fenofibrate (TRICOR) 145 mg tablet Take 1 tablet by mouth once daily  90 tablet 2   • indomethacin (INDOCIN SR) 75 mg CR capsule Take 75 mg by mouth as needed     • lisdexamfetamine (VYVANSE) 50 MG capsule Take 1 capsule by mouth every morning     • semaglutide, 1 mg/dose, (Ozempic) 4 mg/3 mL injection pen Inject 0 75 mL (1 mg total) under the skin every 7 days 3 mL 3   • tadalafil (CIALIS) 20 MG tablet Take 1 tablet (20 mg total) by mouth daily as needed for erectile dysfunction 20 tablet 3   • tadalafil (CIALIS) 5 MG tablet Take 1 tablet (5 mg total) by mouth daily as needed for erectile dysfunction 90 tablet 3     No current facility-administered medications for this visit         Allergies  No Known Allergies      The following portions of the patient's history were reviewed and updated as appropriate: allergies, current medications, past medical history, past social history, past surgical history and problem list       Vitals  Vitals:    05/22/23 1305   BP: 150/82   BP Location: Left arm   Patient Position: Sitting   Cuff Size: Extra-Large   Pulse: 86   SpO2: 92%   Weight: (!) 143 kg (315 lb)   Height: 5' 10\" (1 778 m)           Physical " Exam  Physical Exam  Vitals reviewed  Constitutional:       General: He is not in acute distress  Appearance: Normal appearance  He is normal weight  HENT:      Head: Normocephalic  Eyes:      Pupils: Pupils are equal, round, and reactive to light  Cardiovascular:      Rate and Rhythm: Normal rate  Pulmonary:      Effort: No respiratory distress  Breath sounds: Normal breath sounds  Skin:     General: Skin is warm and dry  Neurological:      General: No focal deficit present  Mental Status: He is alert and oriented to person, place, and time     Psychiatric:         Mood and Affect: Mood normal          Behavior: Behavior normal            Results  Recent Results (from the past 1 hour(s))   POCT urine dip    Collection Time: 05/22/23  1:15 PM   Result Value Ref Range    LEUKOCYTE ESTERASE,UA -     NITRITE,UA -     SL AMB POCT UROBILINOGEN 8     POCT URINE PROTEIN trace      PH,UA 8 5     BLOOD,UA trace     SPECIFIC GRAVITY,UA 1 010     KETONES,UA small     BILIRUBIN,UA -     GLUCOSE, UA -      COLOR,UA yellow     CLARITY,UA clear    POCT Measure PVR    Collection Time: 05/22/23  1:19 PM   Result Value Ref Range    POST-VOID RESIDUAL VOLUME, ML POC 56 mL   ]  No results found for: PSA  Lab Results   Component Value Date    GLUCOSE 93 11/06/2015    CALCIUM 9 0 02/22/2016     11/06/2015    K 4 9 02/08/2023    CO2 20 02/08/2023     02/08/2023    BUN 13 02/08/2023    CREATININE 0 98 02/08/2023     Lab Results   Component Value Date    WBC 10 9 (H) 12/12/2022    HGB 14 3 12/12/2022    HCT 42 7 12/12/2022    MCV 87 12/12/2022     12/12/2022           Orders  Orders Placed This Encounter   Procedures   • POCT urine dip   • POCT Measure PVR       DEVI Marshall

## 2023-06-12 DIAGNOSIS — R73.03 PREDIABETES: Primary | ICD-10-CM

## 2023-06-14 LAB
ALBUMIN SERPL-MCNC: 4.8 G/DL (ref 4–5)
ALBUMIN/GLOB SERPL: 1.7 {RATIO} (ref 1.2–2.2)
ALP SERPL-CCNC: 55 IU/L (ref 44–121)
ALT SERPL-CCNC: 28 IU/L (ref 0–44)
APPEARANCE UR: CLEAR
AST SERPL-CCNC: 17 IU/L (ref 0–40)
BACTERIA URNS QL MICRO: NORMAL
BASOPHILS # BLD AUTO: 0.1 X10E3/UL (ref 0–0.2)
BASOPHILS NFR BLD AUTO: 1 %
BILIRUB SERPL-MCNC: 0.2 MG/DL (ref 0–1.2)
BILIRUB UR QL STRIP: NEGATIVE
BUN SERPL-MCNC: 12 MG/DL (ref 6–20)
BUN/CREAT SERPL: 11 (ref 9–20)
CALCIUM SERPL-MCNC: 10.2 MG/DL (ref 8.7–10.2)
CASTS URNS QL MICRO: NORMAL /LPF
CD3+CD4+ CELLS # BLD: 1249 /UL (ref 359–1519)
CD3+CD4+ CELLS NFR BLD: 44.6 % (ref 30.8–58.5)
CHLORIDE SERPL-SCNC: 103 MMOL/L (ref 96–106)
CO2 SERPL-SCNC: 22 MMOL/L (ref 20–29)
COLOR UR: YELLOW
CREAT SERPL-MCNC: 1.11 MG/DL (ref 0.76–1.27)
EGFR: 90 ML/MIN/1.73
EOSINOPHIL # BLD AUTO: 0.1 X10E3/UL (ref 0–0.4)
EOSINOPHIL NFR BLD AUTO: 1 %
EPI CELLS #/AREA URNS HPF: NORMAL /HPF (ref 0–10)
ERYTHROCYTE [DISTWIDTH] IN BLOOD BY AUTOMATED COUNT: 13.7 % (ref 11.6–15.4)
GAMMA INTERFERON BACKGROUND BLD IA-ACNC: 0.07 IU/ML
GLOBULIN SER-MCNC: 2.9 G/DL (ref 1.5–4.5)
GLUCOSE SERPL-MCNC: 105 MG/DL (ref 70–99)
GLUCOSE UR QL: NEGATIVE
HCT VFR BLD AUTO: 44.5 % (ref 37.5–51)
HGB BLD-MCNC: 15 G/DL (ref 13–17.7)
HGB UR QL STRIP: NEGATIVE
HIV1 RNA # SERPL NAA+PROBE: 60 COPIES/ML
HIV1 RNA SERPL NAA+PROBE-LOG#: 1.78 LOG10COPY/ML
IMM GRANULOCYTES # BLD: 0.1 X10E3/UL (ref 0–0.1)
IMM GRANULOCYTES NFR BLD: 1 %
KETONES UR QL STRIP: NEGATIVE
LEUKOCYTE ESTERASE UR QL STRIP: NEGATIVE
LYMPHOCYTES # BLD AUTO: 2.8 X10E3/UL (ref 0.7–3.1)
LYMPHOCYTES NFR BLD AUTO: 27 %
M TB IFN-G CD4+ T-CELLS BLD-ACNC: 0.16 IU/ML
M TB IFN-G CD4+ T-CELLS BLD-ACNC: 0.17 IU/ML
MCH RBC QN AUTO: 29.9 PG (ref 26.6–33)
MCHC RBC AUTO-ENTMCNC: 33.7 G/DL (ref 31.5–35.7)
MCV RBC AUTO: 89 FL (ref 79–97)
MICRO URNS: NORMAL
MICRO URNS: NORMAL
MITOGEN IGNF BLD-ACNC: >10 IU/ML
MONOCYTES # BLD AUTO: 0.4 X10E3/UL (ref 0.1–0.9)
MONOCYTES NFR BLD AUTO: 4 %
NEUTROPHILS # BLD AUTO: 6.8 X10E3/UL (ref 1.4–7)
NEUTROPHILS NFR BLD AUTO: 66 %
NITRITE UR QL STRIP: NEGATIVE
PH UR STRIP: 7.5 [PH] (ref 5–7.5)
PLATELET # BLD AUTO: 391 X10E3/UL (ref 150–450)
POTASSIUM SERPL-SCNC: 4.9 MMOL/L (ref 3.5–5.2)
PROT SERPL-MCNC: 7.7 G/DL (ref 6–8.5)
PROT UR QL STRIP: NEGATIVE
QUANTIFERON INCUBATION COMMENT: NORMAL
QUANTIFERON-TB GOLD PLUS: NEGATIVE
RBC # BLD AUTO: 5.02 X10E6/UL (ref 4.14–5.8)
RBC #/AREA URNS HPF: NORMAL /HPF (ref 0–2)
SERVICE CMNT-IMP: NORMAL
SODIUM SERPL-SCNC: 140 MMOL/L (ref 134–144)
SP GR UR: 1.01 (ref 1–1.03)
UROBILINOGEN UR STRIP-ACNC: 0.2 MG/DL (ref 0.2–1)
WBC # BLD AUTO: 10.3 X10E3/UL (ref 3.4–10.8)
WBC #/AREA URNS HPF: NORMAL /HPF (ref 0–5)

## 2023-06-14 NOTE — PROGRESS NOTES
Virtual Regular Visit    Verification of patient location:    Patient is located at Home in the following state in which I hold an active license [place]      Assessment/Plan:    Problem List Items Addressed This Visit        Other    HIV disease ([facility] ) - Primary    Relevant Orders    HIV-1 RNA, quantitative, PCR    T-helper cells (CD4) count    CBC and differential    Comprehensive metabolic panel     Progress Note - Infectious Disease   Ramy Regalado 35 y o  male MRN: [id number]  Unit/Bed#:  Encounter: [id number]    Impression/Plan:  1   HIV-doing reasonably well on Biktarvy with a near undetectable viral load and a CD4 count of greater than 1000   His viral load does remain stable at 60 copies but he does recall having a viral URI at the time his labs were obtained  Continue ART, recheck labs in 5 months and follow-up in 6 months  Stressed adherence     2   Morbid Obesity-continue to monitor and stressed the importance of weight loss  Patient is on Ozempic     3   Hypertension-asymptomatic at this time   Unable to assess when this virtual visit     4   Microhematuria-recurrent  Renal ultrasound negative except with moderate postvoid residual   Follow-up with Urology     5   Liver function test abnormalities-now normalized with weight loss  Suspect steatohepatitis   Ultrasound consistent with hepatic steatosis  Will continue monitor LFTs   Stressed importance of ongoing weight loss     6  Pre diabetes-hemoglobin A1c of only 5 2 on repeat   Follow-up with endocrinology     7  Hypertriglyceridemia- Follow-up with endocrinology   Remains on fenofibrate   A bit worse since the last visit  Patient was provided medication, adherence and prevention education    Subjective:  Routine follow-up for HIV  Patient claims 100% adherence with Biktarvy  Patient denies any notable side effects  Overall the feeling well    The patient denies any fever chills or sweats, denies any nausea vomiting or diarrhea, denies any "cough or shortness of breath  Patient had viral URI at the time as his labs were drawn       ROS:  A complete review of systems is negative other than that noted above in the subjective    Followup portions patient history reviewed and updated as: Allergies, current medications, past medical history, past social history, past surgical history, and the problem list    Objective:    Physical exam   Lexi Quinn well in no acute distress  No tachypnea  Able to answer all questions appropriately  Labs, Imaging, & Other studies:   All pertinent labs and imaging studies were personally reviewed    Lab Results   Component Value Date    ALKPHOS 60 02/22/2016    ALT 28 06/12/2023    ANIONGAP 10 11/06/2015    AST 17 06/12/2023    BILITOT 0 65 11/06/2015    BUN 12 06/12/2023    CALCIUM 9 0 02/22/2016     06/12/2023    CO2 22 06/12/2023    CREATININE 1 11 06/12/2023    EGFR 90 06/12/2023    GLUCOSE 93 11/06/2015    K 4 9 06/12/2023     11/06/2015    PROT 8 3 (H) 11/06/2015     Lab Results   Component Value Date    HCT 44 5 06/12/2023    HGB 15 0 06/12/2023    MCV 89 06/12/2023     06/12/2023    WBC 10 3 06/12/2023     Lab Results   Component Value Date    HEPCAB Negative 09/13/2012     Lab Results   Component Value Date    HEPCAB Negative 09/13/2012     Lab Results   Component Value Date    RPR Non Reactive 12/07/2020     CD4 ABS   Date/Time Value Ref Range Status   06/12/2023 10:01 AM 1,249 359 - 1,519 /uL Final     HIV-1 RNA by PCR, Qn   Date/Time Value Ref Range Status   06/12/2023 10:01 AM 60 copies/mL Final     Comment:     The reportable range for this assay is 20 to 10,000,000  copies HIV-1 RNA/mL         No results found for: \"[id number]\"        Current Outpatient Medications:   •  albuterol (PROVENTIL HFA,VENTOLIN HFA) 90 mcg/act inhaler, Inhale 1-2 puffs every 6 (six) hours as needed for wheezing , Disp: , Rfl:   •  ARIPiprazole (ABILIFY) 2 mg tablet, , Disp: , Rfl:   •  " bictegravir-emtricitab-tenofovir alafenamide (Biktarvy) -25 MG tablet, Take 1 tablet by mouth daily with breakfast, Disp: 90 tablet, Rfl: 1  •  buPROPion (WELLBUTRIN XL) 300 mg 24 hr tablet, Take 300 mg by mouth daily, Disp: , Rfl:   •  Cholecalciferol (Vitamin D3) 125 MCG (5000 UT) CAPS, 1 cap Mon-Fri, 2 caps sat/sun, Disp: 90 capsule, Rfl: 3  •  fenofibrate (TRICOR) 145 mg tablet, Take 1 tablet by mouth once daily  , Disp: 90 tablet, Rfl: 2  •  indomethacin (INDOCIN SR) 75 mg CR capsule, Take 75 mg by mouth as needed, Disp: , Rfl:   •  lisdexamfetamine (VYVANSE) 50 MG capsule, Take 1 capsule by mouth every morning, Disp: , Rfl:   •  semaglutide, 2 mg/dose, (Ozempic, 2 MG/DOSE,) 8 mg/ mL injection pen, Inject 0 75 mL (2 mg total) under the skin every 7 days, Disp: 3 mL, Rfl: 3  •  tadalafil (CIALIS) 20 MG tablet, Take 1 tablet (20 mg total) by mouth daily as needed for erectile dysfunction, Disp: 20 tablet, Rfl: 3  •  tadalafil (CIALIS) 5 MG tablet, Take 1 tablet (5 mg total) by mouth daily as needed for erectile dysfunction, Disp: 90 tablet, Rfl: 3             Reason for visit is   Chief Complaint   Patient presents with   • Virtual Regular Visit        Encounter provider Kiarra Spann MD    Provider located at [address] [place] [address]  [phone]      Recent Visits  No visits were found meeting these conditions  Showing recent visits within past 7 days and meeting all other requirements  Future Appointments  No visits were found meeting these conditions  Showing future appointments within next 150 days and meeting all other requirements       The patient was identified by name and date of birth  Fabian Wild was informed that this is a telemedicine visit and that the visit is being conducted through the [facility]  He agrees to proceed     My office door was closed   No one else was in the room  He acknowledged consent and understanding of privacy and security of the video platform  The patient has agreed to participate and understands they can discontinue the visit at any time  Patient is aware this is a billable service  Subjective  Bam Sethi is a 35 y o  male HIV  HPI     Past Medical History:   Diagnosis Date   • Abnormal liver function tests    • ADD (attention deficit disorder)    • Asthma    • Benign essential hypertension    • Depression    • Encounter for screening examination for sexually transmitted disease    • HIV (human immunodeficiency virus infection) ([facility] )    • Hypercholesteremia    • Microhematuria    • Morbid obesity ([facility] )    • Seasonal allergies        Past Surgical History:   Procedure Laterality Date   • TONSILLECTOMY         Current Outpatient Medications   Medication Sig Dispense Refill   • albuterol (PROVENTIL HFA,VENTOLIN HFA) 90 mcg/act inhaler Inhale 1-2 puffs every 6 (six) hours as needed for wheezing      • ARIPiprazole (ABILIFY) 2 mg tablet      • bictegravir-emtricitab-tenofovir alafenamide (Biktarvy) -25 MG tablet Take 1 tablet by mouth daily with breakfast 90 tablet 1   • buPROPion (WELLBUTRIN XL) 300 mg 24 hr tablet Take 300 mg by mouth daily     • Cholecalciferol (Vitamin D3) 125 MCG (5000 UT) CAPS 1 cap Mon-Fri, 2 caps sat/sun 90 capsule 3   • fenofibrate (TRICOR) 145 mg tablet Take 1 tablet by mouth once daily   90 tablet 2   • indomethacin (INDOCIN SR) 75 mg CR capsule Take 75 mg by mouth as needed     • lisdexamfetamine (VYVANSE) 50 MG capsule Take 1 capsule by mouth every morning     • semaglutide, 2 mg/dose, (Ozempic, 2 MG/DOSE,) 8 mg/ mL injection pen Inject 0 75 mL (2 mg total) under the skin every 7 days 3 mL 3   • tadalafil (CIALIS) 20 MG tablet Take 1 tablet (20 mg total) by mouth daily as needed for erectile dysfunction 20 tablet 3   • tadalafil (CIALIS) 5 MG tablet Take 1 tablet (5 mg total) by mouth daily as needed for erectile dysfunction 90 tablet 3     No current facility-administered medications for this visit  No Known Allergies    Review of Systems    Video Exam    There were no vitals filed for this visit      Physical Exam     Visit Time  Total Visit Duration: 30 minutes

## 2023-06-15 ENCOUNTER — TELEMEDICINE (OUTPATIENT)
Dept: INFECTIOUS DISEASES | Facility: CLINIC | Age: 33
End: 2023-06-15
Payer: COMMERCIAL

## 2023-06-15 DIAGNOSIS — R31.29 MICROHEMATURIA: ICD-10-CM

## 2023-06-15 DIAGNOSIS — R73.03 PREDIABETES: ICD-10-CM

## 2023-06-15 DIAGNOSIS — B20 HIV DISEASE (HCC): Primary | ICD-10-CM

## 2023-06-15 DIAGNOSIS — R73.01 IMPAIRED FASTING GLUCOSE: ICD-10-CM

## 2023-06-15 DIAGNOSIS — E66.01 MORBID OBESITY WITH BMI OF 40.0-44.9, ADULT (HCC): ICD-10-CM

## 2023-06-15 DIAGNOSIS — E78.5 HYPERLIPIDEMIA: ICD-10-CM

## 2023-06-15 DIAGNOSIS — K76.0 HEPATIC STEATOSIS: ICD-10-CM

## 2023-06-15 DIAGNOSIS — I10 BENIGN ESSENTIAL HYPERTENSION: ICD-10-CM

## 2023-06-15 PROCEDURE — 99214 OFFICE O/P EST MOD 30 MIN: CPT | Performed by: INTERNAL MEDICINE

## 2023-06-15 RX ORDER — BICTEGRAVIR SODIUM, EMTRICITABINE, AND TENOFOVIR ALAFENAMIDE FUMARATE 50; 200; 25 MG/1; MG/1; MG/1
1 TABLET ORAL
Qty: 90 TABLET | Refills: 1 | Status: SHIPPED | OUTPATIENT
Start: 2023-06-15

## 2023-08-03 ENCOUNTER — OFFICE VISIT (OUTPATIENT)
Dept: INTERNAL MEDICINE CLINIC | Facility: CLINIC | Age: 33
End: 2023-08-03
Payer: COMMERCIAL

## 2023-08-03 VITALS
RESPIRATION RATE: 16 BRPM | SYSTOLIC BLOOD PRESSURE: 130 MMHG | HEIGHT: 70 IN | DIASTOLIC BLOOD PRESSURE: 82 MMHG | HEART RATE: 74 BPM | BODY MASS INDEX: 41.52 KG/M2 | WEIGHT: 290 LBS | OXYGEN SATURATION: 98 %

## 2023-08-03 DIAGNOSIS — J45.20 MILD INTERMITTENT ASTHMA WITHOUT COMPLICATION: Primary | ICD-10-CM

## 2023-08-03 DIAGNOSIS — R73.03 PREDIABETES: ICD-10-CM

## 2023-08-03 DIAGNOSIS — J06.9 UPPER RESPIRATORY TRACT INFECTION, UNSPECIFIED TYPE: ICD-10-CM

## 2023-08-03 DIAGNOSIS — M53.3 COCCYDYNIA: ICD-10-CM

## 2023-08-03 DIAGNOSIS — B20 HIV DISEASE (HCC): ICD-10-CM

## 2023-08-03 DIAGNOSIS — M53.3 SACRAL PAIN: ICD-10-CM

## 2023-08-03 DIAGNOSIS — F31.81 BIPOLAR 2 DISORDER (HCC): ICD-10-CM

## 2023-08-03 PROBLEM — R31.29 MICROHEMATURIA: Status: RESOLVED | Noted: 2019-02-21 | Resolved: 2023-08-03

## 2023-08-03 PROBLEM — F17.210 CIGARETTE NICOTINE DEPENDENCE WITHOUT COMPLICATION: Status: RESOLVED | Noted: 2019-08-15 | Resolved: 2023-08-03

## 2023-08-03 PROBLEM — K76.0 HEPATIC STEATOSIS: Status: RESOLVED | Noted: 2017-06-15 | Resolved: 2023-08-03

## 2023-08-03 PROBLEM — R25.2 MUSCLE CRAMPS: Status: ACTIVE | Noted: 2017-10-05

## 2023-08-03 PROCEDURE — 99203 OFFICE O/P NEW LOW 30 MIN: CPT | Performed by: INTERNAL MEDICINE

## 2023-08-03 RX ORDER — AZITHROMYCIN 250 MG/1
TABLET, FILM COATED ORAL
Qty: 6 TABLET | Refills: 0 | Status: SHIPPED | OUTPATIENT
Start: 2023-08-03 | End: 2023-08-08

## 2023-08-03 RX ORDER — ALBUTEROL SULFATE 90 UG/1
1-2 AEROSOL, METERED RESPIRATORY (INHALATION) EVERY 6 HOURS PRN
Qty: 6.7 G | Refills: 3 | Status: SHIPPED | OUTPATIENT
Start: 2023-08-03

## 2023-08-03 NOTE — PROGRESS NOTES
Assessment/Plan:    #Sacral Pain  -fell off a horse 18 years ago and landed on buttock  -felt fine for a while when weight was elevated but now with weight loss is feeling pain in sacral area with tenderness touching or sitting  -will obtain CT sacral area  -refer to PT    #URI  -runny nose with yellow mucus, has sinus pressure  -denies fever  -did not do a home covid test  -is feeling better slowly  -remains on mucinex  -send in for azithromycin if symptoms worsen    #HIV  -seeing Dr La Loya  -remains on biktarvy    #Asthma  -has intermittent flares  -refilled albuterol    #Prediabetes  -seeing endocrine  -is on ozempic for weight loss    #Bipolar  -seeing Dr Yuniel Tomlinson  -is on abilify, vyvanse, wellbutrin    #Hepatic Steatosis  -is loosing weight with ozempic and will continue  -awaiting lipids, a1c and CMP  -was on fenofibrate in the past    #Hematuria  -seeing urology  -current UA without issue    #Overactive Bladder  -urology suggested using oxybytynin if symptoms persist but now stable    #Erectile Dysfunction  -remains on tadalafil    #Health Maintenance  -routine labs and followup 6 months  -is an     No problem-specific Assessment & Plan notes found for this encounter. Diagnoses and all orders for this visit:    Mild intermittent asthma without complication  -     albuterol (PROVENTIL HFA,VENTOLIN HFA) 90 mcg/act inhaler; Inhale 1-2 puffs every 6 (six) hours as needed for wheezing    HIV disease ([address])    Prediabetes    Bipolar 2 disorder ([address])    Upper respiratory tract infection, unspecified type  -     azithromycin (Zithromax) 250 mg tablet; Take 2 tablets (500 mg total) by mouth daily for 1 day, THEN 1 tablet (250 mg total) daily for 4 days. Sacral pain  -     Ambulatory referral to Physical Therapy;  Future  -     CT spine lumbar wo contrast; Future            Current Outpatient Medications:   •  albuterol (PROVENTIL HFA,VENTOLIN HFA) 90 mcg/act inhaler, Inhale 1-2 puffs every 6 (six) hours as needed for wheezing, Disp: 6.7 g, Rfl: 3  •  azithromycin (Zithromax) 250 mg tablet, Take 2 tablets (500 mg total) by mouth daily for 1 day, THEN 1 tablet (250 mg total) daily for 4 days. , Disp: 6 tablet, Rfl: 0  •  ARIPiprazole (ABILIFY) 2 mg tablet, , Disp: , Rfl:   •  bictegravir-emtricitab-tenofovir alafenamide (Biktarvy) -25 MG tablet, Take 1 tablet by mouth daily with breakfast, Disp: 90 tablet, Rfl: 1  •  buPROPion (WELLBUTRIN XL) 300 mg 24 hr tablet, Take 300 mg by mouth daily, Disp: , Rfl:   •  Cholecalciferol (Vitamin D3) 125 MCG (5000 UT) CAPS, 1 cap Mon-Fri, 2 caps sat/sun, Disp: 90 capsule, Rfl: 3  •  fenofibrate (TRICOR) 145 mg tablet, Take 1 tablet by mouth once daily. , Disp: 90 tablet, Rfl: 2  •  indomethacin (INDOCIN SR) 75 mg CR capsule, Take 75 mg by mouth as needed, Disp: , Rfl:   •  lisdexamfetamine (VYVANSE) 50 MG capsule, Take 1 capsule by mouth every morning, Disp: , Rfl:   •  semaglutide, 2 mg/dose, (Ozempic, 2 MG/DOSE,) 8 mg/ mL injection pen, Inject 0.75 mL (2 mg total) under the skin every 7 days, Disp: 3 mL, Rfl: 3  •  tadalafil (CIALIS) 20 MG tablet, Take 1 tablet (20 mg total) by mouth daily as needed for erectile dysfunction, Disp: 20 tablet, Rfl: 3  •  tadalafil (CIALIS) 5 MG tablet, Take 1 tablet (5 mg total) by mouth daily as needed for erectile dysfunction, Disp: 90 tablet, Rfl: 3    Subjective:      Patient ID: Joann Salcedo is a 35 y.o. male. HPI patient presents for a new patient visit. He has a history of HIV and bipolar disease. He follows up with psychiatry. He is also Biktarvy and is compliant with the medication. He follows up with infectious disease. He has a history of prediabetes and is on Ozempic for weight loss. He has been dieting and exercising and trying to stay active. She reports that for the past few days he has been having yellow phlegm production and bringing up mucus. He denies any fevers. He is starting to get better.   We did give him a prescription for a Z-Osmin in case his symptoms worsen or he starts to develop any fevers. Years ago patient reports that he injured his tailbone when he fell off a horse. He landed on it flat on the ground. States that it did not give him much issue when he weighed more however with the weight loss from Ozempic he is now starting to feel the discomfort again. We will obtain a CAT scan of his sacral area. We will refer him to physical therapy as well. He has a history of hematuria and follows up with urology. Most recent UA was unremarkable. He has asthma and is on albuterol he will continue with that. He has overactive bladder and is avoiding caffeine. He denies any smoking or alcohol or drug use. Family history is positive for skin cancers in mother and his grandfather. [address] mother and father both had a CABG. Grandmother and father both have diabetes. Mother and brother have asthma. Both parents have hypertension and hyperlipidemia. Patient will return to care in 6 months. The following portions of the patient's history were reviewed and updated as appropriate: allergies, current medications, past family history, past medical history, past social history, past surgical history and problem list.    Review of Systems   Constitutional: Negative for activity change, appetite change, fatigue and fever. HENT: Positive for congestion, rhinorrhea, sinus pressure and sinus pain. Negative for ear pain, hearing loss, sore throat and tinnitus. Eyes: Negative for photophobia, pain and visual disturbance. Respiratory: Positive for cough. Negative for shortness of breath and wheezing. Cardiovascular: Negative for chest pain and leg swelling. Gastrointestinal: Negative for abdominal distention, abdominal pain, nausea and vomiting. Genitourinary: Negative for difficulty urinating, frequency and hematuria. Musculoskeletal: Positive for back pain (sacral).  Negative for arthralgias, joint swelling, neck pain and neck stiffness. Skin: Negative for color change, pallor, rash and wound. Neurological: Negative for dizziness, tremors, numbness and headaches. Hematological: Does not bruise/bleed easily. Objective:      /82   Pulse 74   Resp 16   Ht 5' 10" (1.778 m)   Wt 132 kg (290 lb)   SpO2 98%   BMI 41.61 kg/m²          Physical Exam  Vitals reviewed. Constitutional:       Appearance: Normal appearance. He is well-developed. He is obese. HENT:      Head: Normocephalic and atraumatic. Right Ear: Tympanic membrane, ear canal and external ear normal. There is no impacted cerumen. Left Ear: Tympanic membrane, ear canal and external ear normal. There is no impacted cerumen. Nose: Congestion and rhinorrhea present. Mouth/Throat:      Pharynx: Oropharynx is clear. No oropharyngeal exudate or posterior oropharyngeal erythema. Eyes:      Conjunctiva/sclera: Conjunctivae normal.      Pupils: Pupils are equal, round, and reactive to light. Neck:      Thyroid: No thyromegaly. Vascular: No JVD. Cardiovascular:      Rate and Rhythm: Normal rate and regular rhythm. Pulses: Normal pulses. Heart sounds: Normal heart sounds. No murmur heard. Pulmonary:      Effort: Pulmonary effort is normal. No respiratory distress. Breath sounds: Normal breath sounds. No stridor. No wheezing, rhonchi or rales. Abdominal:      General: Bowel sounds are normal. There is no distension. Palpations: Abdomen is soft. There is no mass. Tenderness: There is no abdominal tenderness. There is no guarding or rebound. Musculoskeletal:         General: Tenderness (sacral pain) present. No deformity. Normal range of motion. Cervical back: Normal range of motion and neck supple. No rigidity or tenderness. Right lower leg: No edema. Left lower leg: No edema. Lymphadenopathy:      Cervical: No cervical adenopathy.    Skin:     General: Skin is warm and dry. Findings: No erythema or rash. Neurological:      Mental Status: He is alert and oriented to person, place, and time. Mental status is at baseline. Sensory: No sensory deficit. Motor: No weakness. Gait: Gait normal.      Deep Tendon Reflexes: Reflexes are normal and symmetric. Psychiatric:         Mood and Affect: Mood normal.         Behavior: Behavior normal.         Thought Content: Thought content normal.         Judgment: Judgment normal.           This note was completed in part utilizing [employer] direct voice recognition software. Grammatical errors, random word insertion, spelling mistakes, and incomplete sentences may be an occasional consequence of the system secondary to software limitations, ambient noise and hardware issues. At the time of dictation, efforts were made to edit, clarify and /or correct errors. Please read the chart carefully and recognize, using context, where substitutions have occurred. If you have any questions or concerns about the context, text or information contained within the body of this dictation, please contact myself, the provider, for further clarification. 06-Jan-2022

## 2023-08-08 PROBLEM — M53.3 COCCYDYNIA: Status: ACTIVE | Noted: 2023-08-08

## 2023-08-18 DIAGNOSIS — N52.9 ERECTILE DYSFUNCTION, UNSPECIFIED ERECTILE DYSFUNCTION TYPE: ICD-10-CM

## 2023-08-18 RX ORDER — TADALAFIL 5 MG/1
5 TABLET ORAL DAILY PRN
Qty: 90 TABLET | Refills: 0 | Status: SHIPPED | OUTPATIENT
Start: 2023-08-18

## 2023-08-24 ENCOUNTER — HOSPITAL ENCOUNTER (OUTPATIENT)
Dept: RADIOLOGY | Age: 33
Discharge: HOME | End: 2023-08-24
Payer: COMMERCIAL

## 2023-08-24 DIAGNOSIS — M53.3 SACRAL PAIN: ICD-10-CM

## 2023-08-24 DIAGNOSIS — M53.3 COCCYDYNIA: ICD-10-CM

## 2023-08-24 PROCEDURE — 72148 MRI LUMBAR SPINE W/O DYE: CPT

## 2023-08-24 PROCEDURE — G1004 CDSM NDSC: HCPCS

## 2023-08-29 DIAGNOSIS — R73.03 PREDIABETES: ICD-10-CM

## 2023-09-28 ENCOUNTER — TELEPHONE (OUTPATIENT)
Dept: INFECTIOUS DISEASES | Facility: CLINIC | Age: 33
End: 2023-09-28

## 2023-09-28 NOTE — TELEPHONE ENCOUNTER
Pt calls office and states he wanted to make an appt for follow up in December. Informed him that  does not his schedule out that far as of right now. Explained he is on our recall list and we would each out soon to get him in for f/u. Pt verbalizes understanding     Pt also states he wants to start taking a protein shake in morning and it has a lot of different vitamins and mineral. He would like to know if this is ok.  He states he will be uploading a picture of the label to his mychart if  would like to look at

## 2023-09-29 NOTE — TELEPHONE ENCOUNTER
Contacted to infrom him that  Per  he should space out protein shake from HIV medication maybe take medication in morning and have shake in afternoon or evening.     Pt verbalizes understanding and gives thanks

## 2023-10-01 DIAGNOSIS — E78.1 HYPERTRIGLYCERIDEMIA: ICD-10-CM

## 2023-10-01 DIAGNOSIS — B20 HIV DISEASE (HCC): ICD-10-CM

## 2023-10-02 RX ORDER — FENOFIBRATE 145 MG/1
145 TABLET, COATED ORAL DAILY
Qty: 90 TABLET | Refills: 0 | Status: SHIPPED | OUTPATIENT
Start: 2023-10-02

## 2023-10-02 RX ORDER — BICTEGRAVIR SODIUM, EMTRICITABINE, AND TENOFOVIR ALAFENAMIDE FUMARATE 50; 200; 25 MG/1; MG/1; MG/1
1 TABLET ORAL
Qty: 90 TABLET | Refills: 0 | Status: SHIPPED | OUTPATIENT
Start: 2023-10-02

## 2023-10-13 LAB
ALBUMIN SERPL-MCNC: 4.5 G/DL (ref 4.1–5.1)
ALBUMIN/GLOB SERPL: 1.7 {RATIO} (ref 1.2–2.2)
ALP SERPL-CCNC: 50 IU/L (ref 44–121)
ALT SERPL-CCNC: 17 IU/L (ref 0–44)
AST SERPL-CCNC: 16 IU/L (ref 0–40)
BILIRUB SERPL-MCNC: 0.3 MG/DL (ref 0–1.2)
BUN SERPL-MCNC: 11 MG/DL (ref 6–20)
BUN/CREAT SERPL: 10 (ref 9–20)
CALCIUM SERPL-MCNC: 10 MG/DL (ref 8.7–10.2)
CHLORIDE SERPL-SCNC: 102 MMOL/L (ref 96–106)
CHOLEST SERPL-MCNC: 172 MG/DL (ref 100–199)
CO2 SERPL-SCNC: 22 MMOL/L (ref 20–29)
CREAT SERPL-MCNC: 1.11 MG/DL (ref 0.76–1.27)
EGFR: 90 ML/MIN/1.73
GLOBULIN SER-MCNC: 2.7 G/DL (ref 1.5–4.5)
GLUCOSE SERPL-MCNC: 90 MG/DL (ref 70–99)
HBA1C MFR BLD: 4.9 % (ref 4.8–5.6)
HDLC SERPL-MCNC: 30 MG/DL
LDLC SERPL CALC-MCNC: 101 MG/DL (ref 0–99)
POTASSIUM SERPL-SCNC: 5 MMOL/L (ref 3.5–5.2)
PROT SERPL-MCNC: 7.2 G/DL (ref 6–8.5)
SODIUM SERPL-SCNC: 138 MMOL/L (ref 134–144)
TRIGL SERPL-MCNC: 239 MG/DL (ref 0–149)

## 2023-10-20 ENCOUNTER — OFFICE VISIT (OUTPATIENT)
Dept: ENDOCRINOLOGY | Facility: CLINIC | Age: 33
End: 2023-10-20

## 2023-10-20 VITALS
WEIGHT: 258.2 LBS | SYSTOLIC BLOOD PRESSURE: 136 MMHG | DIASTOLIC BLOOD PRESSURE: 98 MMHG | HEART RATE: 83 BPM | HEIGHT: 70 IN | BODY MASS INDEX: 36.97 KG/M2

## 2023-10-20 DIAGNOSIS — E66.01 CLASS 2 SEVERE OBESITY DUE TO EXCESS CALORIES WITH SERIOUS COMORBIDITY AND BODY MASS INDEX (BMI) OF 37.0 TO 37.9 IN ADULT: ICD-10-CM

## 2023-10-20 DIAGNOSIS — E55.9 VITAMIN D DEFICIENCY: ICD-10-CM

## 2023-10-20 DIAGNOSIS — E78.1 HYPERTRIGLYCERIDEMIA: ICD-10-CM

## 2023-10-20 DIAGNOSIS — R73.01 IMPAIRED FASTING GLUCOSE: Primary | ICD-10-CM

## 2023-10-20 DIAGNOSIS — R73.03 PREDIABETES: ICD-10-CM

## 2023-10-20 NOTE — ASSESSMENT & PLAN NOTE
Has lost 70 pounds since his last visit-encouraged to continue dietary modifications and continue Ozempic

## 2023-10-20 NOTE — PROGRESS NOTES
Deisi Reid 35 y.o. male MRN: [id number]    Encounter: [id number]      Assessment/Plan   Problem List Items Addressed This Visit          Endocrine    Impaired fasting glucose - Primary     Fasting glucose and A1c has normalized-continue Ozempic and dietary modifications         Relevant Orders    Comprehensive metabolic panel Lab Collect    HEMOGLOBIN A1C W/ EAG ESTIMATION Lab Collect       Other    Class 2 severe obesity due to excess calories with serious comorbidity and body mass index (BMI) of 37.0 to 37.9 in adult      Has lost 70 pounds since his last visit-encouraged to continue dietary modifications and continue Ozempic         Hypertriglyceridemia    Relevant Orders    T4, free Lab Collect    TSH, 3rd generation Lab Collect    Prediabetes    Relevant Medications    semaglutide, 2 mg/dose, (Ozempic, 2 MG/DOSE,) 8 mg/ mL injection pen    semaglutide, 2 mg/dose, (Ozempic, 2 MG/DOSE,) 8 mg/ mL injection pen    Vitamin D deficiency     Decrease vitamin D3 to 5000 international units daily         Relevant Medications    Cholecalciferol (Vitamin D3) 125 MCG (5000 UT) CAPS    Other Relevant Orders    Vitamin D 25 hydroxy Lab Collect        CC:     History of Present Illness     HPI:  35 y.o. male with a history of Prediabetes, dyslipidemia, Vitamin D Deficiency, and Obesity seen in follow-up  Since his last visit he has been on Ozempic and tolerating without any GI side effects.   Currently on 2 mg weekly  Lost 70 lbs since last visit  Review of Systems    Historical Information   Past Medical History:   Diagnosis Date    Abnormal liver function tests     ADD (attention deficit disorder)     Allergic     Asthma     Benign essential hypertension     Depression     Encounter for screening examination for sexually transmitted disease     GERD (gastroesophageal reflux disease)     HIV (human immunodeficiency virus infection) ([address] [facility])     Hypercholesteremia     Hypertension     Microhematuria     Morbid obesity ([address] [facility]) Obesity     Seasonal allergies      Past Surgical History:   Procedure Laterality Date    TONSILLECTOMY       Social History   Social History     Substance and Sexual Activity   Alcohol Use Yes    Comment: 1 drink per month     Social History     Substance and Sexual Activity   Drug Use Yes    Frequency: 7.0 times per week    Types: Marijuana     Social History     Tobacco Use   Smoking Status Former    Packs/day: 0.50    Years: 10.00    Total pack years: 5.00    Types: Cigarettes    Quit date: 2019    Years since quittin.1   Smokeless Tobacco Never     Family History:   Family History   Problem Relation Age of Onset    Hyperlipidemia Mother     Mental illness Mother     Depression Mother     Asthma Mother     Arthritis Mother     Cancer Mother         skin    Psychiatric Illness Mother     Diabetes Father     Hyperlipidemia Father     Hypertension Father     Heart disease Father     Diabetes type II Father     Alcohol abuse Father     Mental illness Father     Depression Father     Bipolar disorder Father     Diabetes Paternal Aunt     Diabetes Paternal Uncle     Cancer Maternal Grandfather         prostate    Prostate cancer Maternal Grandfather     Diabetes Paternal Grandmother     Heart disease Paternal Grandmother     ADD / ADHD Brother        Meds/Allergies   Current Outpatient Medications   Medication Sig Dispense Refill    albuterol (PROVENTIL HFA,VENTOLIN HFA) 90 mcg/act inhaler Inhale 1-2 puffs every 6 (six) hours as needed for wheezing 6.7 g 3    ARIPiprazole (ABILIFY) 2 mg tablet       bictegravir-emtricitab-tenofovir alafenamide (Biktarvy) -25 MG tablet Take 1 tablet by mouth daily with breakfast 90 tablet 0    buPROPion (WELLBUTRIN XL) 300 mg 24 hr tablet Take 300 mg by mouth daily      Cholecalciferol (Vitamin D3) 125 MCG (5000 UT) CAPS 1 cap daily 90 capsule 3    fenofibrate (TRICOR) 145 mg tablet Take 1 tablet (145 mg total) by mouth daily 90 tablet 0    indomethacin (INDOCIN SR) 75 mg CR capsule Take 75 mg by mouth as needed      lisdexamfetamine (VYVANSE) 50 MG capsule Take 1 capsule by mouth every morning      semaglutide, 2 mg/dose, (Ozempic, 2 MG/DOSE,) 8 mg/ mL injection pen Inject 0.75 mL (2 mg total) under the skin every 7 days 3 mL 3    semaglutide, 2 mg/dose, (Ozempic, 2 MG/DOSE,) 8 mg/ mL injection pen Inject 0.75 mL (2 mg total) under the skin every 7 days 3 mL 0    tadalafil (CIALIS) 20 MG tablet Take 1 tablet (20 mg total) by mouth daily as needed for erectile dysfunction 20 tablet 3    tadalafil (CIALIS) 5 MG tablet Take 1 tablet (5 mg total) by mouth daily as needed for erectile dysfunction 90 tablet 0     No current facility-administered medications for this visit. No Known Allergies    Objective   Vitals: Blood pressure 136/98, pulse 83, height 5' 10" (1.778 m), weight 117 kg (258 lb 3.2 oz). Physical Exam  Vitals reviewed. Constitutional:       General: He is not in acute distress. Appearance: Normal appearance. He is obese. He is not ill-appearing, toxic-appearing or diaphoretic. HENT:      Head: Normocephalic and atraumatic. Eyes:      General: No scleral icterus. Extraocular Movements: Extraocular movements intact. Cardiovascular:      Rate and Rhythm: Normal rate and regular rhythm. Heart sounds: Normal heart sounds. No murmur heard. Pulmonary:      Effort: Pulmonary effort is normal. No respiratory distress. Breath sounds: Normal breath sounds. No wheezing or rales. Abdominal:      General: There is no distension. Palpations: Abdomen is soft. Tenderness: There is no abdominal tenderness. Musculoskeletal:      Cervical back: Neck supple. Right lower leg: No edema. Left lower leg: No edema. Lymphadenopathy:      Cervical: No cervical adenopathy. Skin:     General: Skin is warm and dry. Neurological:      General: No focal deficit present.       Mental Status: He is alert and oriented to person, place, and time.      Gait: Gait normal.   Psychiatric:         Mood and Affect: Mood normal.         Behavior: Behavior normal.         Thought Content: Thought content normal.         Judgment: Judgment normal.         The history was obtained from the review of the chart, patient.     Lab Results:   Lab Results   Component Value Date/Time    Hemoglobin A1C 4.9 10/12/2023 10:29 AM    Hemoglobin A1C 5.7 (H) 02/08/2023 10:15 AM    White Blood Cell Count 10.3 06/12/2023 10:01 AM    White Blood Cell Count 10.9 (H) 12/12/2022 09:54 AM    Hemoglobin 15.0 06/12/2023 10:01 AM    Hemoglobin 14.3 12/12/2022 09:54 AM    HCT 44.5 06/12/2023 10:01 AM    HCT 42.7 12/12/2022 09:54 AM    MCV 89 06/12/2023 10:01 AM    MCV 87 12/12/2022 09:54 AM    Platelet Count 306 30/95/3855 10:01 AM    Platelet Count 783 05/07/7133 09:54 AM    BUN 11 10/12/2023 10:29 AM    BUN 12 06/12/2023 10:01 AM    BUN 13 02/08/2023 10:15 AM    Potassium 5.0 10/12/2023 10:29 AM    Potassium 4.9 06/12/2023 10:01 AM    Potassium 4.9 02/08/2023 10:15 AM    Chloride 102 10/12/2023 10:29 AM    Chloride 103 06/12/2023 10:01 AM    Chloride 106 02/08/2023 10:15 AM    CO2 22 10/12/2023 10:29 AM    CO2 22 06/12/2023 10:01 AM    CO2 20 02/08/2023 10:15 AM    Creatinine 1.11 10/12/2023 10:29 AM    Creatinine 1.11 06/12/2023 10:01 AM    Creatinine 0.98 02/08/2023 10:15 AM    AST 16 10/12/2023 10:29 AM    AST 17 06/12/2023 10:01 AM    AST 28 02/08/2023 10:15 AM    ALT 17 10/12/2023 10:29 AM    ALT 28 06/12/2023 10:01 AM    ALT 44 02/08/2023 10:15 AM    Protein, Total 7.2 10/12/2023 10:29 AM    Protein, Total 7.7 06/12/2023 10:01 AM    Protein, Total 7.6 02/08/2023 10:15 AM    Albumin 4.5 10/12/2023 10:29 AM    Albumin 4.8 06/12/2023 10:01 AM    Albumin 4.5 02/08/2023 10:15 AM    Globulin, Total 2.7 10/12/2023 10:29 AM    Globulin, Total 2.9 06/12/2023 10:01 AM    Globulin, Total 3.1 02/08/2023 10:15 AM    HDL 30 (L) 10/12/2023 10:29 AM    HDL 34 (L) 02/08/2023 10:15 AM Triglycerides 239 (H) 10/12/2023 10:29 AM    Triglycerides 376 (H) 02/08/2023 10:15 AM           Imaging Studies: I have personally reviewed pertinent reports. Portions of the record may have been created with voice recognition software. Occasional wrong word or "sound a like" substitutions may have occurred due to the inherent limitations of voice recognition software. Read the chart carefully and recognize, using context, where substitutions have occurred.

## 2023-11-06 DIAGNOSIS — N52.9 ERECTILE DYSFUNCTION, UNSPECIFIED ERECTILE DYSFUNCTION TYPE: ICD-10-CM

## 2023-11-06 RX ORDER — TADALAFIL 20 MG/1
20 TABLET ORAL DAILY PRN
Qty: 20 TABLET | Refills: 0 | Status: SHIPPED | OUTPATIENT
Start: 2023-11-06

## 2023-11-06 RX ORDER — TADALAFIL 5 MG/1
5 TABLET ORAL DAILY PRN
Qty: 90 TABLET | Refills: 0 | Status: SHIPPED | OUTPATIENT
Start: 2023-11-06

## 2023-11-27 ENCOUNTER — TELEPHONE (OUTPATIENT)
Dept: INFECTIOUS DISEASES | Facility: CLINIC | Age: 33
End: 2023-11-27

## 2023-11-27 NOTE — TELEPHONE ENCOUNTER
Called and spoke with patient today. Informed patient I was calling to get him scheduled for 6 month follow up. Patient is scheduled 12/7/23 at 3:15PM with Dr. Chen Peng at the [facility] office. Also reminded patient to go have labs done prior to appointment. Patient states he will go tomorrow.

## 2023-11-30 LAB
ALBUMIN SERPL-MCNC: 4.6 G/DL (ref 4.1–5.1)
ALBUMIN/GLOB SERPL: 1.6 {RATIO} (ref 1.2–2.2)
ALP SERPL-CCNC: 56 IU/L (ref 44–121)
ALT SERPL-CCNC: 25 IU/L (ref 0–44)
AST SERPL-CCNC: 18 IU/L (ref 0–40)
BASOPHILS # BLD AUTO: 0.1 X10E3/UL (ref 0–0.2)
BASOPHILS NFR BLD AUTO: 1 %
BILIRUB SERPL-MCNC: 0.3 MG/DL (ref 0–1.2)
BUN SERPL-MCNC: 24 MG/DL (ref 6–20)
BUN/CREAT SERPL: 22 (ref 9–20)
CALCIUM SERPL-MCNC: 10.5 MG/DL (ref 8.7–10.2)
CD3+CD4+ CELLS # BLD: 1296 /UL (ref 359–1519)
CD3+CD4+ CELLS NFR BLD: 48 % (ref 30.8–58.5)
CHLORIDE SERPL-SCNC: 105 MMOL/L (ref 96–106)
CO2 SERPL-SCNC: 20 MMOL/L (ref 20–29)
CREAT SERPL-MCNC: 1.1 MG/DL (ref 0.76–1.27)
EGFR: 91 ML/MIN/1.73
EOSINOPHIL # BLD AUTO: 0.1 X10E3/UL (ref 0–0.4)
EOSINOPHIL NFR BLD AUTO: 1 %
ERYTHROCYTE [DISTWIDTH] IN BLOOD BY AUTOMATED COUNT: 12.8 % (ref 11.6–15.4)
GLOBULIN SER-MCNC: 2.9 G/DL (ref 1.5–4.5)
GLUCOSE SERPL-MCNC: 83 MG/DL (ref 70–99)
HCT VFR BLD AUTO: 44.4 % (ref 37.5–51)
HGB BLD-MCNC: 14.5 G/DL (ref 13–17.7)
HIV1 RNA # SERPL NAA+PROBE: <20 COPIES/ML
HIV1 RNA SERPL NAA+PROBE-LOG#: NORMAL LOG10COPY/ML
IMM GRANULOCYTES # BLD: 0.2 X10E3/UL (ref 0–0.1)
IMM GRANULOCYTES NFR BLD: 2 %
LYMPHOCYTES # BLD AUTO: 2.7 X10E3/UL (ref 0.7–3.1)
LYMPHOCYTES NFR BLD AUTO: 23 %
MCH RBC QN AUTO: 30.3 PG (ref 26.6–33)
MCHC RBC AUTO-ENTMCNC: 32.7 G/DL (ref 31.5–35.7)
MCV RBC AUTO: 93 FL (ref 79–97)
MONOCYTES # BLD AUTO: 0.5 X10E3/UL (ref 0.1–0.9)
MONOCYTES NFR BLD AUTO: 4 %
NEUTROPHILS # BLD AUTO: 8.3 X10E3/UL (ref 1.4–7)
NEUTROPHILS NFR BLD AUTO: 69 %
PLATELET # BLD AUTO: 432 X10E3/UL (ref 150–450)
POTASSIUM SERPL-SCNC: 5.2 MMOL/L (ref 3.5–5.2)
PROT SERPL-MCNC: 7.5 G/DL (ref 6–8.5)
RBC # BLD AUTO: 4.79 X10E6/UL (ref 4.14–5.8)
SODIUM SERPL-SCNC: 141 MMOL/L (ref 134–144)
WBC # BLD AUTO: 11.8 X10E3/UL (ref 3.4–10.8)

## 2023-12-06 ENCOUNTER — PATIENT MESSAGE (OUTPATIENT)
Dept: ENDOCRINOLOGY | Facility: CLINIC | Age: 33
End: 2023-12-06

## 2023-12-06 NOTE — PROGRESS NOTES
Progress Note - Infectious Disease   Kat Irizarry 35 y.o. male MRN: [id number]  Unit/Bed#:  Encounter: [id number]      Impression/Plan:  1. HIV-doing well on Biktarvy with a undetectable viral load and a CD4 count of greater than 1000. Continue ART, recheck labs in 5 months and follow-up in 6 months. Stressed adherence     2. Morbid Obesity-has lost a substantial amount of weight, approximately 70 pound. Patient is on Ozempic. Continue to encourage ongoing weight loss with diet and exercise     3. Hypertension-asymptomatic at this time. Now well-controlled. 4.  Microhematuria-recurrent. Renal ultrasound negative except with moderate postvoid residual.  Follow-up with Urology     5. Liver function test abnormalities-now normalized with weight loss. Suspect steatohepatitis. Ultrasound consistent with hepatic steatosis. Will continue monitor LFTs. Stressed importance of ongoing weight loss     6. Pre diabetes-hemoglobin A1c of only 5.2 on repeat. Follow-up with endocrinology     7. Hypertriglyceridemia- Follow-up with endocrinology. Remains on fenofibrate. A bit worse since the last visit. 8.  Healthcare maintenance. Patient already received the COVID-vaccine. Will give influenza vaccine high-dose today. Patient was provided medication, adherence and prevention education    Subjective:  Routine follow-up for HIV. Patient claims 100% adherence with Biktarvy. Patient denies any notable side effects. Overall the feeling well. The patient denies any fever chills or sweats, denies any nausea vomiting or diarrhea, denies any cough or shortness of breath. ROS:  A complete review of systems is negative other than that noted above in the subjective    Followup portions patient history reviewed and updated as:   Allergies, current medications, past medical history, past social history, past surgical history, and the problem list    Objective:  Vitals:  Vitals:    12/07/23 1515   BP: 112/72 Pulse: 75   Resp: 18   Temp: (!) 97 °F (36.1 °C)   SpO2: 96%   Weight: 117 kg (257 lb)   Height: 5' 10" (1.778 m)       Physical Exam:   General Appearance:  Alert, interactive, appearing well,  nontoxic, no acute distress. Neck:   Supple without lymphadenopathy, no thyromegaly or masses   Throat: Oropharynx moist without lesions. Lungs:   Clear to auscultation bilaterally; no wheezes, rhonchi or rales; respirations unlabored   Heart:  RRR; no murmur, rub or gallop   Abdomen:   Soft, non-tender, non-distended, positive bowel sounds. Extremities: No clubbing, cyanosis or edema   Skin: No new rashes or lesions. No draining wounds noted. Labs, Imaging, & Other studies:   All pertinent labs and imaging studies were personally reviewed    Lab Results   Component Value Date     11/06/2015    K 5.2 11/29/2023     11/29/2023    CO2 20 11/29/2023    ANIONGAP 10 11/06/2015    BUN 24 (H) 11/29/2023    CREATININE 1.10 11/29/2023    GLUCOSE 93 11/06/2015    CALCIUM 9.0 02/22/2016    AST 18 11/29/2023    ALT 25 11/29/2023    ALKPHOS 60 02/22/2016    PROT 8.3 (H) 11/06/2015    BILITOT 0.65 11/06/2015    EGFR 91 11/29/2023     Lab Results   Component Value Date    WBC 11.8 (H) 11/29/2023    HGB 14.5 11/29/2023    HCT 44.4 11/29/2023    MCV 93 11/29/2023     11/29/2023     Lab Results   Component Value Date    HEPCAB Negative 09/13/2012     Lab Results   Component Value Date    HEPCAB Negative 09/13/2012     Lab Results   Component Value Date    RPR Non Reactive 12/07/2020     CD4 ABS   Date/Time Value Ref Range Status   11/29/2023 08:56 AM 1,296 359 - 1,519 /uL Final     HIV-1 RNA by PCR, Qn   Date/Time Value Ref Range Status   11/29/2023 08:56 AM <20 copies/mL Final     Comment:     HIV-1 RNA detected  The reportable range for this assay is 20 to 10,000,000  copies HIV-1 RNA/mL.                Current Outpatient Medications:     albuterol (PROVENTIL HFA,VENTOLIN HFA) 90 mcg/act inhaler, Inhale 1-2 puffs every 6 (six) hours as needed for wheezing, Disp: 6.7 g, Rfl: 3    ARIPiprazole (ABILIFY) 2 mg tablet, , Disp: , Rfl:     bictegravir-emtricitab-tenofovir alafenamide (Biktarvy) -25 MG tablet, Take 1 tablet by mouth daily with breakfast, Disp: 90 tablet, Rfl: 0    buPROPion (WELLBUTRIN XL) 300 mg 24 hr tablet, Take 300 mg by mouth daily, Disp: , Rfl:     Cholecalciferol (Vitamin D3) 125 MCG (5000 UT) CAPS, 1 cap daily, Disp: 90 capsule, Rfl: 3    fenofibrate (TRICOR) 145 mg tablet, Take 1 tablet (145 mg total) by mouth daily, Disp: 90 tablet, Rfl: 0    indomethacin (INDOCIN SR) 75 mg CR capsule, Take 75 mg by mouth as needed, Disp: , Rfl:     lisdexamfetamine (VYVANSE) 50 MG capsule, Take 1 capsule by mouth every morning, Disp: , Rfl:     semaglutide, 2 mg/dose, (Ozempic, 2 MG/DOSE,) 8 mg/ mL injection pen, Inject 0.75 mL (2 mg total) under the skin every 7 days, Disp: 3 mL, Rfl: 3    semaglutide, 2 mg/dose, (Ozempic, 2 MG/DOSE,) 8 mg/ mL injection pen, Inject 0.75 mL (2 mg total) under the skin every 7 days, Disp: 3 mL, Rfl: 0    tadalafil (CIALIS) 20 MG tablet, Take 1 tablet (20 mg total) by mouth daily as needed for erectile dysfunction, Disp: 20 tablet, Rfl: 0    tadalafil (CIALIS) 5 MG tablet, Take 1 tablet (5 mg total) by mouth daily as needed for erectile dysfunction, Disp: 90 tablet, Rfl: 0

## 2023-12-07 ENCOUNTER — OFFICE VISIT (OUTPATIENT)
Dept: INFECTIOUS DISEASES | Facility: CLINIC | Age: 33
End: 2023-12-07
Payer: COMMERCIAL

## 2023-12-07 VITALS
HEIGHT: 70 IN | BODY MASS INDEX: 36.79 KG/M2 | TEMPERATURE: 97 F | OXYGEN SATURATION: 96 % | DIASTOLIC BLOOD PRESSURE: 72 MMHG | RESPIRATION RATE: 18 BRPM | SYSTOLIC BLOOD PRESSURE: 112 MMHG | WEIGHT: 257 LBS | HEART RATE: 75 BPM

## 2023-12-07 DIAGNOSIS — I10 BENIGN ESSENTIAL HYPERTENSION: ICD-10-CM

## 2023-12-07 DIAGNOSIS — E66.01 CLASS 2 SEVERE OBESITY DUE TO EXCESS CALORIES WITH SERIOUS COMORBIDITY AND BODY MASS INDEX (BMI) OF 37.0 TO 37.9 IN ADULT: Primary | ICD-10-CM

## 2023-12-07 DIAGNOSIS — Z23 NEEDS FLU SHOT: ICD-10-CM

## 2023-12-07 DIAGNOSIS — R74.01 TRANSAMINITIS: ICD-10-CM

## 2023-12-07 DIAGNOSIS — B20 HIV DISEASE (HCC): Primary | ICD-10-CM

## 2023-12-07 DIAGNOSIS — Z00.00 HEALTHCARE MAINTENANCE: ICD-10-CM

## 2023-12-07 DIAGNOSIS — R31.29 MICROHEMATURIA: ICD-10-CM

## 2023-12-07 DIAGNOSIS — E78.1 HYPERTRIGLYCERIDEMIA: ICD-10-CM

## 2023-12-07 DIAGNOSIS — E66.01 CLASS 2 SEVERE OBESITY DUE TO EXCESS CALORIES WITH SERIOUS COMORBIDITY AND BODY MASS INDEX (BMI) OF 37.0 TO 37.9 IN ADULT: ICD-10-CM

## 2023-12-07 DIAGNOSIS — R73.03 PREDIABETES: ICD-10-CM

## 2023-12-07 PROCEDURE — 90662 IIV NO PRSV INCREASED AG IM: CPT

## 2023-12-07 PROCEDURE — 99215 OFFICE O/P EST HI 40 MIN: CPT | Performed by: INTERNAL MEDICINE

## 2023-12-07 PROCEDURE — 90471 IMMUNIZATION ADMIN: CPT

## 2023-12-07 RX ORDER — LISDEXAMFETAMINE DIMESYLATE CAPSULES 60 MG/1
60 CAPSULE ORAL EVERY MORNING
COMMUNITY
Start: 2023-12-02

## 2023-12-11 ENCOUNTER — TELEPHONE (OUTPATIENT)
Dept: ENDOCRINOLOGY | Facility: CLINIC | Age: 33
End: 2023-12-11

## 2023-12-11 DIAGNOSIS — R73.01 IMPAIRED FASTING GLUCOSE: Primary | ICD-10-CM

## 2023-12-11 DIAGNOSIS — E55.9 VITAMIN D DEFICIENCY: ICD-10-CM

## 2023-12-11 NOTE — TELEPHONE ENCOUNTER
? Your request has been denied  The information we received from the prescriber's office supports the use of Wegovy to manage weight. According to the Plan's Summary Program Description (SPD), drugs used to manage weight are excluded.  Please refer to page 33 of the SPD for addition

## 2023-12-11 NOTE — TELEPHONE ENCOUNTER
Silvia Blake (Key: [id number])  Rx #: [id number]  KCKPPA 2.4MG/0.75ML auto-injectors     Form  [facility] Electronic Prior Authorization Form

## 2024-01-06 DIAGNOSIS — E78.1 HYPERTRIGLYCERIDEMIA: ICD-10-CM

## 2024-01-06 DIAGNOSIS — B20 HIV DISEASE (HCC): ICD-10-CM

## 2024-01-06 RX ORDER — BICTEGRAVIR SODIUM, EMTRICITABINE, AND TENOFOVIR ALAFENAMIDE FUMARATE 50; 200; 25 MG/1; MG/1; MG/1
1 TABLET ORAL
Qty: 90 TABLET | Refills: 0 | Status: SHIPPED | OUTPATIENT
Start: 2024-01-06

## 2024-01-08 DIAGNOSIS — B20 HIV DISEASE (HCC): ICD-10-CM

## 2024-01-08 DIAGNOSIS — N52.9 ERECTILE DYSFUNCTION, UNSPECIFIED ERECTILE DYSFUNCTION TYPE: ICD-10-CM

## 2024-01-08 RX ORDER — TADALAFIL 5 MG/1
5 TABLET ORAL DAILY PRN
Qty: 90 TABLET | Refills: 0 | Status: SHIPPED | OUTPATIENT
Start: 2024-01-08 | End: 2024-01-09

## 2024-01-08 RX ORDER — FENOFIBRATE 145 MG/1
145 TABLET, COATED ORAL DAILY
Qty: 90 TABLET | Refills: 0 | Status: SHIPPED | OUTPATIENT
Start: 2024-01-08

## 2024-01-08 RX ORDER — BICTEGRAVIR SODIUM, EMTRICITABINE, AND TENOFOVIR ALAFENAMIDE FUMARATE 50; 200; 25 MG/1; MG/1; MG/1
1 TABLET ORAL
Qty: 90 TABLET | Refills: 0 | Status: SHIPPED | OUTPATIENT
Start: 2024-01-08 | End: 2024-01-09 | Stop reason: SDUPTHER

## 2024-01-08 RX ORDER — TADALAFIL 20 MG/1
20 TABLET ORAL DAILY PRN
Qty: 20 TABLET | Refills: 0 | Status: SHIPPED | OUTPATIENT
Start: 2024-01-08

## 2024-01-09 ENCOUNTER — TELEPHONE (OUTPATIENT)
Age: 34
End: 2024-01-09

## 2024-01-09 DIAGNOSIS — B20 HIV DISEASE (HCC): ICD-10-CM

## 2024-01-09 RX ORDER — TADALAFIL 5 MG/1
5 TABLET ORAL DAILY
Qty: 90 TABLET | Refills: 3 | Status: SHIPPED | OUTPATIENT
Start: 2024-01-09

## 2024-01-09 NOTE — TELEPHONE ENCOUNTER
From: Zack Harper  Sent: 1/9/2024   2:07 PM EST  To: Infectious Disease Pod Clinical  Subject: Biktarvy                                         Good Afternoon Dr. Decker,    I saw that you had sent in an Rx for Gabino Rodriguez to [facility], which he doesn't use anymore due to a change in his insurance. I did submit a refill request through [facility] to have it sent to [email or web address], which is Dan's new mail order pharmacy for the year, and that did go through to them. But then I discovered that they do not accept copay savings cards, which would mean a 90-day of Biktarvy would cost $225, and that's just not necessary.     After doing some research, I found that [email or web address] and [facility] (different from [facility]) partnered at some point. So he should be able to fill it with [facility] instead. I know that they will accept copay savings cards. I'm just not sure if I can fill 90 days or not, but [facility] will modify it to 30 days if the insurance won't allow it. I'll submit a refill request again after this message. Sorry for the fiasco, and thanks for your time and attention.    Consuelo Scruggs

## 2024-01-10 RX ORDER — BICTEGRAVIR SODIUM, EMTRICITABINE, AND TENOFOVIR ALAFENAMIDE FUMARATE 50; 200; 25 MG/1; MG/1; MG/1
1 TABLET ORAL
Qty: 90 TABLET | Refills: 0 | Status: SHIPPED | OUTPATIENT
Start: 2024-01-10

## 2024-03-18 DIAGNOSIS — N52.9 ERECTILE DYSFUNCTION, UNSPECIFIED ERECTILE DYSFUNCTION TYPE: ICD-10-CM

## 2024-03-19 RX ORDER — TADALAFIL 20 MG/1
TABLET ORAL
Qty: 20 TABLET | Refills: 0 | Status: SHIPPED | OUTPATIENT
Start: 2024-03-19

## 2024-03-19 NOTE — TELEPHONE ENCOUNTER
Pharmacy comment: Provider Q; Potential duplicate therapy; Does this replace the 5mg?  If no please send note if patient is taking both or the other.

## 2024-03-27 NOTE — TELEPHONE ENCOUNTER
[facility] pharmacy called to clarify tadalafil Rx. Advised 5 mg daily and 20 mg 1 per day as needed for sexual encounters.

## 2024-04-25 DIAGNOSIS — N52.9 ERECTILE DYSFUNCTION, UNSPECIFIED ERECTILE DYSFUNCTION TYPE: ICD-10-CM

## 2024-04-26 RX ORDER — TADALAFIL 20 MG/1
TABLET ORAL
Qty: 20 TABLET | Refills: 0 | Status: SHIPPED | OUTPATIENT
Start: 2024-04-26

## 2024-05-03 ENCOUNTER — TELEPHONE (OUTPATIENT)
Dept: INFECTIOUS DISEASES | Facility: CLINIC | Age: 34
End: 2024-05-03

## 2024-05-13 DIAGNOSIS — B20 HIV DISEASE (HCC): ICD-10-CM

## 2024-05-13 RX ORDER — BICTEGRAVIR SODIUM, EMTRICITABINE, AND TENOFOVIR ALAFENAMIDE FUMARATE 50; 200; 25 MG/1; MG/1; MG/1
1 TABLET ORAL
Qty: 90 TABLET | Refills: 0 | Status: SHIPPED | OUTPATIENT
Start: 2024-05-13

## 2024-05-28 DIAGNOSIS — E66.01 CLASS 2 SEVERE OBESITY DUE TO EXCESS CALORIES WITH SERIOUS COMORBIDITY AND BODY MASS INDEX (BMI) OF 37.0 TO 37.9 IN ADULT (HCC): ICD-10-CM

## 2024-06-04 ENCOUNTER — OFFICE VISIT (OUTPATIENT)
Dept: SURGERY | Facility: CLINIC | Age: 34
End: 2024-06-04
Payer: COMMERCIAL

## 2024-06-04 DIAGNOSIS — K40.20 NON-RECURRENT BILATERAL INGUINAL HERNIA WITHOUT OBSTRUCTION OR GANGRENE: Primary | ICD-10-CM

## 2024-06-04 PROCEDURE — 99203 OFFICE O/P NEW LOW 30 MIN: CPT | Performed by: SURGERY

## 2024-06-04 NOTE — PROGRESS NOTES
Ambulatory Visit  Name: Zack Harper      : 1990      MRN: [id number]  Encounter Provider: Kristian Murcia MD  Encounter Date: 2024   Encounter department: [facility] [place]    Assessment & Plan   1. Non-recurrent bilateral inguinal hernia without obstruction or gangrene  Assessment & Plan:  Talked to him about what a hernia is and how it would be repaired.  Told him with bilateral's I usually do this laparoscopically or robotically.  I discussed the procedure in detail including risks, benefits, alternatives, and what to expect postoperatively.  He would like to wait till September so he was given instructions on signs and symptoms of incarceration and strangulation.  Will see him back in September and set things up.      History of Present Illness     Zack Harper is a 34 y.o. male who presents for hernia evaluation.  He has noted a lump right side more than left.  Occasional discomfort when he is doing things such as coughing or sneezing.  No change in bowel or bladder habits.    Review of Systems   Constitutional:  Negative for chills, fatigue and fever.   HENT:  Negative for congestion, ear pain, sneezing, trouble swallowing and voice change.    Eyes:  Negative for pain and discharge.   Respiratory:  Negative for cough, shortness of breath and wheezing.    Cardiovascular:  Negative for palpitations and leg swelling.   Gastrointestinal:  Negative for abdominal pain, constipation, diarrhea, nausea and vomiting.   Endocrine: Negative for cold intolerance, heat intolerance and polyuria.   Genitourinary:  Negative for decreased urine volume, difficulty urinating and dysuria.   Musculoskeletal:  Negative for arthralgias and back pain.   Skin:  Negative for rash and wound.   Allergic/Immunologic: Negative for environmental allergies and food allergies.   Neurological:  Negative for dizziness and weakness.   Hematological:  Negative for adenopathy. Does not bruise/bleed easily.    Psychiatric/Behavioral:  Negative for confusion and sleep disturbance. The patient is not nervous/anxious.    All other systems reviewed and are negative.      Objective     There were no vitals taken for this visit.    Physical Exam  Constitutional:       General: He is not in acute distress.     Appearance: He is well-developed. He is not diaphoretic.   HENT:      Head: Normocephalic and atraumatic.      Right Ear: External ear normal.      Left Ear: External ear normal.   Eyes:      General: No scleral icterus.     Conjunctiva/sclera: Conjunctivae normal.   Neck:      Thyroid: No thyromegaly.      Trachea: No tracheal deviation.   Cardiovascular:      Rate and Rhythm: Normal rate and regular rhythm.      Heart sounds: Normal heart sounds. No murmur heard.  Pulmonary:      Effort: Pulmonary effort is normal. No respiratory distress.      Breath sounds: Normal breath sounds. No wheezing or rales.   Abdominal:      General: There is no distension.      Palpations: Abdomen is soft. There is no mass.      Tenderness: There is no abdominal tenderness. There is no guarding or rebound.      Comments: Diastases recti upper abdomen  Right inguinal hernia easily reducible  Left inguinal hernia easily reducible   Musculoskeletal:         General: Normal range of motion.      Cervical back: Normal range of motion and neck supple.   Lymphadenopathy:      Cervical: No cervical adenopathy.   Skin:     General: Skin is warm and dry.   Neurological:      Mental Status: He is alert and oriented to person, place, and time.   Psychiatric:         Behavior: Behavior normal.         Thought Content: Thought content normal.         Judgment: Judgment normal.       Administrative Statements

## 2024-06-04 NOTE — ASSESSMENT & PLAN NOTE
Talked to him about what a hernia is and how it would be repaired.  Told him with bilateral's I usually do this laparoscopically or robotically.  I discussed the procedure in detail including risks, benefits, alternatives, and what to expect postoperatively.  He would like to wait till September so he was given instructions on signs and symptoms of incarceration and strangulation.  Will see him back in September and set things up.

## 2024-06-06 ENCOUNTER — OFFICE VISIT (OUTPATIENT)
Dept: INTERNAL MEDICINE CLINIC | Facility: CLINIC | Age: 34
End: 2024-06-06
Payer: COMMERCIAL

## 2024-06-06 VITALS
TEMPERATURE: 98 F | BODY MASS INDEX: 36.45 KG/M2 | WEIGHT: 254 LBS | OXYGEN SATURATION: 98 % | HEART RATE: 89 BPM | DIASTOLIC BLOOD PRESSURE: 86 MMHG | SYSTOLIC BLOOD PRESSURE: 122 MMHG

## 2024-06-06 DIAGNOSIS — J01.41 ACUTE RECURRENT PANSINUSITIS: Primary | ICD-10-CM

## 2024-06-06 DIAGNOSIS — B20 HIV DISEASE (HCC): ICD-10-CM

## 2024-06-06 DIAGNOSIS — F31.81 BIPOLAR 2 DISORDER (HCC): ICD-10-CM

## 2024-06-06 PROBLEM — R73.01 IMPAIRED FASTING GLUCOSE: Status: RESOLVED | Noted: 2022-08-05 | Resolved: 2024-06-06

## 2024-06-06 PROBLEM — E78.1 HYPERTRIGLYCERIDEMIA: Status: RESOLVED | Noted: 2020-03-12 | Resolved: 2024-06-06

## 2024-06-06 PROBLEM — I10 BENIGN ESSENTIAL HYPERTENSION: Status: RESOLVED | Noted: 2017-02-02 | Resolved: 2024-06-06

## 2024-06-06 PROBLEM — F32.A DEPRESSION: Status: RESOLVED | Noted: 2021-02-03 | Resolved: 2024-06-06

## 2024-06-06 PROCEDURE — 99214 OFFICE O/P EST MOD 30 MIN: CPT | Performed by: INTERNAL MEDICINE

## 2024-06-06 RX ORDER — AMOXICILLIN 875 MG/1
875 TABLET, COATED ORAL 2 TIMES DAILY
Qty: 20 TABLET | Refills: 0 | Status: SHIPPED | OUTPATIENT
Start: 2024-06-06 | End: 2024-06-16

## 2024-06-06 NOTE — PATIENT INSTRUCTIONS
Amoxicillin for 7-10 more days  Continue xyzal, flonase, plenty of fluids  Nasal saline  ENT appointment

## 2024-06-06 NOTE — PROGRESS NOTES
Ambulatory Visit  Name: Zack Harper      : 1990      MRN: [id number]  Encounter Provider: Landon Santos DO  Encounter Date: 2024   Encounter department: [facility] INTERNAL MEDICINE    Assessment & Plan   1. Acute recurrent pansinusitis  Comments:  suspect symptoms ongoing from sinus infection.  will treat with 7-10 days of amoxicillin.  f/u ENT  Orders:  -     amoxicillin (AMOXIL) 875 mg tablet; Take 1 tablet (875 mg total) by mouth 2 (two) times a day for 10 days  -     Ambulatory Referral to Otolaryngology; Future  2. Bipolar 2 disorder (HCC)  Comments:  seeing psychiatry and stable, c/w rx  3. HIV disease (HCC)  Comments:  taking ART and seeing ID for ongoing care         History of Present Illness     HPI    New to me, here to establish care.  Used to see Dr Singh as PCP in past.  Reviewed meds and briefly reviewed past med hx with patient.  Zack is here with c/o sinus symptoms for the last few weeks.  He has HIV on ART and CD4 is over 1000.  He hit his head while in a hotel room with a small bed.  He did not lose consciousness or black out but his scalp got a bit swollen and bruised.  After this, he developed sinus congestion, runny nose, sinus pressure and headaches.  He did a virtual visit with the  under his insurance and he was prescribed augmentin for 7 days for a suspected sinus infection.  He felt a bit better but still had headaches and went to  and was prescribed a medrol dose pack.  This has helped but he is still having occ headaches, especially when the steroid dose is wearing off.  He is using flonase and xyzal.  He is taking other OTC remedies which help temporarily.  He has no other questions or concerns today and ROS is otherwise negative.    Review of Systems   Constitutional:  Negative for fever.   HENT:  Positive for congestion, postnasal drip, rhinorrhea, sinus pressure and sinus pain.    Eyes:  Negative for visual disturbance.   Respiratory:  Negative for  shortness of breath.    Cardiovascular:  Negative for chest pain.   Gastrointestinal:  Negative for abdominal pain.   Endocrine: Negative for polyuria.   Genitourinary:  Negative for difficulty urinating.   Musculoskeletal:  Negative for gait problem.   Skin:  Negative for rash.   Allergic/Immunologic: Positive for environmental allergies.   Neurological:  Positive for headaches (sinus headache). Negative for dizziness.   Psychiatric/Behavioral:  Negative for dysphoric mood.      Past Medical History:   Diagnosis Date    Abnormal liver function tests     ADD (attention deficit disorder)     Allergic     Asthma     Benign essential hypertension     Depression     Encounter for screening examination for sexually transmitted disease     GERD (gastroesophageal reflux disease)     HIV (human immunodeficiency virus infection) (HCC)     Hypercholesteremia     Hypertension     Microhematuria     Morbid obesity (HCC)     Obesity     Seasonal allergies      Past Surgical History:   Procedure Laterality Date    TONSILLECTOMY       Family History   Problem Relation Age of Onset    Hyperlipidemia Mother     Mental illness Mother     Depression Mother     Asthma Mother     Arthritis Mother     Cancer Mother         skin    Psychiatric Illness Mother     Diabetes Father     Hyperlipidemia Father     Hypertension Father     Heart disease Father     Diabetes type II Father     Alcohol abuse Father     Mental illness Father     Depression Father     Bipolar disorder Father     Diabetes Paternal Aunt     Diabetes Paternal Uncle     Cancer Maternal Grandfather         prostate    Prostate cancer Maternal Grandfather     Diabetes Paternal Grandmother     Heart disease Paternal Grandmother     ADD / ADHD Brother      Social History     Tobacco Use    Smoking status: Former     Current packs/day: 0.00     Average packs/day: 0.5 packs/day for 10.0 years (5.0 ttl pk-yrs)     Types: Cigarettes     Start date: 9/1/2009     Quit date: 9/1/2019      Years since quittin.7    Smokeless tobacco: Never   Vaping Use    Vaping status: Former    Substances: Nicotine, Flavoring   Substance and Sexual Activity    Alcohol use: Yes     Comment: 1 drink per month    Drug use: Yes     Frequency: 7.0 times per week     Types: Marijuana    Sexual activity: Yes     Partners: Male     Birth control/protection: None     Current Outpatient Medications on File Prior to Visit   Medication Sig    albuterol (PROVENTIL HFA,VENTOLIN HFA) 90 mcg/act inhaler Inhale 1-2 puffs every 6 (six) hours as needed for wheezing    ARIPiprazole (ABILIFY) 2 mg tablet     bictegravir-emtricitab-tenofovir alafenamide (Biktarvy) -25 MG tablet Take 1 tablet by mouth daily with breakfast    buPROPion (WELLBUTRIN XL) 300 mg 24 hr tablet Take 300 mg by mouth daily    Cholecalciferol (Vitamin D3) 125 MCG (5000 UT) CAPS 1 cap daily    indomethacin (INDOCIN SR) 75 mg CR capsule Take 75 mg by mouth as needed    Semaglutide-Weight Management (WEGOVY) 2.4 MG/0.75ML 2.4 mg weekly    tadalafil (CIALIS) 20 MG tablet Take 1 tablet by mouth daily as needed for erectile dysfunction.    tadalafil (CIALIS) 5 MG tablet Take 1 tablet (5 mg total) by mouth in the morning    fenofibrate (TRICOR) 145 mg tablet Take 1 tablet by mouth daily.    [DISCONTINUED] lisdexamfetamine (VYVANSE) 60 MG capsule Take 60 mg by mouth every morning     No Known Allergies  Immunization History   Administered Date(s) Administered    COVID-19 PFIZER VACCINE 0.3 ML IM 2021, 2021, 2021    COVID-19 Pfizer Vac BIVALENT Anton-sucrose 12 Yr+ IM 2023, 11/10/2023    DT (pediatric) 06/15/2004    DTP 1990, 1990, 1990, 1991, 1995    Hep B, Adolescent or Pediatric 1992, 1992, 1993    HiB 1991    INFLUENZA 10/28/2013, 2020, 12/15/2022    Influenza, high dose seasonal 0.7 mL 2023    Influenza, injectable, quadrivalent, preservative free 0.5 mL 2022     Influenza, recombinant, quadrivalent,injectable, preservative free 11/14/2019, 11/20/2020, 12/15/2022    MMR 07/01/1991, 03/08/1994    Meningococcal MCV4P 08/22/2006, 08/15/2019    OPV 1990, 1990, 09/30/1991, 03/14/1995    Pneumococcal Conjugate 13-Valent 02/13/2020, 06/11/2020    Pneumococcal Polysaccharide PPV23 10/11/2012    Smallpox Monkeypox Vaccine, Live Attenuated, Preservative) 08/16/2022    Tdap 07/21/2020, 07/22/2020    Tuberculin Skin Test-PPD Intradermal 03/13/1991     Objective     /86 (BP Location: Left arm, Patient Position: Sitting, Cuff Size: Standard)   Pulse 89   Temp 98 °F (36.7 °C)   Wt 115 kg (254 lb)   SpO2 98%   BMI 36.45 kg/m²     Physical Exam  Vitals reviewed.   Constitutional:       General: He is not in acute distress.     Appearance: Normal appearance.   HENT:      Head: Normocephalic and atraumatic.      Right Ear: Tympanic membrane normal.      Left Ear: Tympanic membrane normal.      Nose:      Right Turbinates: Swollen.      Left Turbinates: Swollen.      Right Sinus: Maxillary sinus tenderness and frontal sinus tenderness present.      Left Sinus: Maxillary sinus tenderness and frontal sinus tenderness present.      Mouth/Throat:      Mouth: Mucous membranes are moist.   Eyes:      Conjunctiva/sclera: Conjunctivae normal.   Cardiovascular:      Rate and Rhythm: Normal rate and regular rhythm.      Heart sounds: No murmur heard.  Pulmonary:      Effort: Pulmonary effort is normal.      Breath sounds: No wheezing or rales.   Abdominal:      General: Abdomen is flat. Bowel sounds are normal.      Palpations: Abdomen is soft.      Tenderness: There is no abdominal tenderness.   Musculoskeletal:      Right lower leg: No edema.      Left lower leg: No edema.   Lymphadenopathy:      Cervical: No cervical adenopathy.   Neurological:      Mental Status: He is alert. Mental status is at baseline.   Psychiatric:         Mood and Affect: Mood normal.         Behavior:  Behavior normal.       Administrative Statements

## 2024-06-22 LAB
ALBUMIN SERPL-MCNC: 4.5 G/DL (ref 4.1–5.1)
ALP SERPL-CCNC: 73 IU/L (ref 44–121)
ALT SERPL-CCNC: 28 IU/L (ref 0–44)
AST SERPL-CCNC: 18 IU/L (ref 0–40)
BASOPHILS # BLD AUTO: 0 X10E3/UL (ref 0–0.2)
BASOPHILS NFR BLD AUTO: 1 %
BILIRUB SERPL-MCNC: 0.7 MG/DL (ref 0–1.2)
BUN SERPL-MCNC: 20 MG/DL (ref 6–20)
BUN/CREAT SERPL: 22 (ref 9–20)
CALCIUM SERPL-MCNC: 9.7 MG/DL (ref 8.7–10.2)
CD3+CD4+ CELLS # BLD: 1167 /UL (ref 359–1519)
CD3+CD4+ CELLS NFR BLD: 44.9 % (ref 30.8–58.5)
CHLORIDE SERPL-SCNC: 101 MMOL/L (ref 96–106)
CO2 SERPL-SCNC: 23 MMOL/L (ref 20–29)
CREAT SERPL-MCNC: 0.89 MG/DL (ref 0.76–1.27)
EGFR: 115 ML/MIN/1.73
EOSINOPHIL # BLD AUTO: 0.1 X10E3/UL (ref 0–0.4)
EOSINOPHIL NFR BLD AUTO: 2 %
ERYTHROCYTE [DISTWIDTH] IN BLOOD BY AUTOMATED COUNT: 16 % (ref 11.6–15.4)
GLOBULIN SER-MCNC: 3.4 G/DL (ref 1.5–4.5)
GLUCOSE SERPL-MCNC: 86 MG/DL (ref 70–99)
HCT VFR BLD AUTO: 42.5 % (ref 37.5–51)
HGB BLD-MCNC: 13.4 G/DL (ref 13–17.7)
HIV1 RNA # SERPL NAA+PROBE: <20 COPIES/ML
HIV1 RNA SERPL NAA+PROBE-LOG#: NORMAL LOG10COPY/ML
IMM GRANULOCYTES # BLD: 0 X10E3/UL (ref 0–0.1)
IMM GRANULOCYTES NFR BLD: 0 %
LYMPHOCYTES # BLD AUTO: 2.6 X10E3/UL (ref 0.7–3.1)
LYMPHOCYTES NFR BLD AUTO: 34 %
MCH RBC QN AUTO: 27.1 PG (ref 26.6–33)
MCHC RBC AUTO-ENTMCNC: 31.5 G/DL (ref 31.5–35.7)
MCV RBC AUTO: 86 FL (ref 79–97)
MONOCYTES # BLD AUTO: 0.5 X10E3/UL (ref 0.1–0.9)
MONOCYTES NFR BLD AUTO: 6 %
NEUTROPHILS # BLD AUTO: 4.4 X10E3/UL (ref 1.4–7)
NEUTROPHILS NFR BLD AUTO: 57 %
PLATELET # BLD AUTO: 404 X10E3/UL (ref 150–450)
POTASSIUM SERPL-SCNC: 4.7 MMOL/L (ref 3.5–5.2)
PROT SERPL-MCNC: 7.9 G/DL (ref 6–8.5)
RBC # BLD AUTO: 4.95 X10E6/UL (ref 4.14–5.8)
SODIUM SERPL-SCNC: 137 MMOL/L (ref 134–144)
WBC # BLD AUTO: 7.7 X10E3/UL (ref 3.4–10.8)

## 2024-06-24 NOTE — ASSESSMENT & PLAN NOTE
Doing very well after substantial weight loss on Ozempic.  He is lost over 100 pounds.  Continue to encourage lifestyle modification for additional weight loss

## 2024-06-24 NOTE — ASSESSMENT & PLAN NOTE
Doing well on Biktarvy with an undetectable viral load and a CD4 count of greater than 1000.  Continue ART, recheck CBC with differential, CMP, HIV RNA, and CD4 in 5 months to make sure no developing toxicities or treatment failure and follow-up in 6 months.  Stressed adherence

## 2024-06-24 NOTE — PROGRESS NOTES
"Progress Note - Infectious Disease   Zack Harper 34 y.o. male MRN: [id number]  Unit/Bed#:  Encounter: [id number]      Impression/Plan:  HIV disease (HCC)  Doing well on Biktarvy with an undetectable viral load and a CD4 count of greater than 1000.  Continue ART, recheck CBC with differential, CMP, HIV RNA, and CD4 in 5 months to make sure no developing toxicities or treatment failure and follow-up in 6 months.  Stressed adherence    Class 2 severe obesity due to excess calories with serious comorbidity and body mass index (BMI) of 37.0 to 37.9 in adult   Doing very well after substantial weight loss on Ozempic.  He is lost over 100 pounds.  Continue to encourage lifestyle modification for additional weight loss    Hyperlipidemia  Remains on fenofibrate.  No concerning drug interactions with the ART.    Prediabetes  Hemoglobin A1c has decreased in the setting of weight loss.  Await repeat hemoglobin A1c.    Bipolar 2 disorder (HCC)  On Abilify and Wellbutrin without concerning drug interactions.    Patient was provided medication, adherence and prevention education    Subjective:  Routine follow-up for HIV.  Patient claims 100% adherence with Biktarvy. Patient denies any notable side effects.  Overall the feeling well.  The patient denies any fever chills or sweats, denies any nausea vomiting or diarrhea, denies any cough or shortness of breath.    ROS:  A complete review of systems is negative other than that noted above in the subjective    Followup portions patient history reviewed and updated as:  Allergies, current medications, past medical history, past social history, past surgical history, and the problem list    Objective:  Vitals:  Vitals:    06/27/24 1359   BP: 124/84   Pulse: 100   Resp: 16   Temp: 97.8 °F (36.6 °C)   SpO2: 97%   Weight: 103 kg (227 lb 6.4 oz)   Height: 5' 10\" (1.778 m)       Physical Exam:   General Appearance:  Alert, interactive, appearing well,  nontoxic, no acute distress. "   Neck:   Supple without lymphadenopathy, no thyromegaly or masses   Throat: Oropharynx moist without lesions.    Lungs:   Clear to auscultation bilaterally; no wheezes, rhonchi or rales; respirations unlabored   Heart:  RRR; no murmur, rub or gallop   Abdomen:   Soft, non-tender, non-distended, positive bowel sounds.     Extremities: No clubbing, cyanosis or edema   Skin: No new rashes or lesions. No draining wounds noted.       Labs, Imaging, & Other studies:   All pertinent labs and imaging studies were personally reviewed    Lab Results   Component Value Date     11/06/2015    K 4.7 06/21/2024     06/21/2024    CO2 23 06/21/2024    ANIONGAP 10 11/06/2015    BUN 20 06/21/2024    CREATININE 0.89 06/21/2024    GLUCOSE 93 11/06/2015    CALCIUM 9.0 02/22/2016    AST 18 06/21/2024    ALT 28 06/21/2024    ALKPHOS 60 02/22/2016    PROT 8.3 (H) 11/06/2015    BILITOT 0.65 11/06/2015    EGFR 115 06/21/2024     Lab Results   Component Value Date    WBC 7.7 06/21/2024    HGB 13.4 06/21/2024    HCT 42.5 06/21/2024    MCV 86 06/21/2024     06/21/2024     Lab Results   Component Value Date    HEPCAB Negative 09/13/2012     Lab Results   Component Value Date    HEPCAB Negative 09/13/2012     Lab Results   Component Value Date    RPR Non Reactive 12/07/2020     CD4 ABS   Date/Time Value Ref Range Status   06/21/2024 09:48 AM 1,167 359 - 1,519 /uL Final     HIV-1 RNA by PCR, Qn   Date/Time Value Ref Range Status   06/21/2024 09:48 AM <20 copies/mL Final     Comment:     HIV-1 RNA not detected  The reportable range for this assay is 20 to 10,000,000  copies HIV-1 RNA/mL.             Current Outpatient Medications:     albuterol (PROVENTIL HFA,VENTOLIN HFA) 90 mcg/act inhaler, Inhale 1-2 puffs every 6 (six) hours as needed for wheezing, Disp: 6.7 g, Rfl: 3    ARIPiprazole (ABILIFY) 2 mg tablet, , Disp: , Rfl:     bictegravir-emtricitab-tenofovir alafenamide (Biktarvy) -25 MG tablet, Take 1 tablet by mouth  daily with breakfast, Disp: 90 tablet, Rfl: 0    buPROPion (WELLBUTRIN XL) 300 mg 24 hr tablet, Take 300 mg by mouth daily, Disp: , Rfl:     Cholecalciferol (Vitamin D3) 125 MCG (5000 UT) CAPS, 1 cap daily, Disp: 90 capsule, Rfl: 3    fenofibrate (TRICOR) 145 mg tablet, Take 1 tablet by mouth daily., Disp: 90 tablet, Rfl: 0    indomethacin (INDOCIN SR) 75 mg CR capsule, Take 75 mg by mouth as needed, Disp: , Rfl:     Semaglutide-Weight Management (WEGOVY) 2.4 MG/0.75ML, 2.4 mg weekly, Disp: 9 mL, Rfl: 0    tadalafil (CIALIS) 20 MG tablet, Take 1 tablet by mouth daily as needed for erectile dysfunction., Disp: 20 tablet, Rfl: 0    tadalafil (CIALIS) 5 MG tablet, Take 1 tablet (5 mg total) by mouth in the morning, Disp: 90 tablet, Rfl: 3

## 2024-06-27 ENCOUNTER — OFFICE VISIT (OUTPATIENT)
Dept: INFECTIOUS DISEASES | Facility: CLINIC | Age: 34
End: 2024-06-27
Payer: COMMERCIAL

## 2024-06-27 VITALS
OXYGEN SATURATION: 97 % | WEIGHT: 227.4 LBS | BODY MASS INDEX: 32.56 KG/M2 | SYSTOLIC BLOOD PRESSURE: 124 MMHG | DIASTOLIC BLOOD PRESSURE: 84 MMHG | RESPIRATION RATE: 16 BRPM | HEART RATE: 100 BPM | TEMPERATURE: 97.8 F | HEIGHT: 70 IN

## 2024-06-27 DIAGNOSIS — E66.01 CLASS 2 SEVERE OBESITY DUE TO EXCESS CALORIES WITH SERIOUS COMORBIDITY AND BODY MASS INDEX (BMI) OF 37.0 TO 37.9 IN ADULT (HCC): ICD-10-CM

## 2024-06-27 DIAGNOSIS — F31.81 BIPOLAR 2 DISORDER (HCC): ICD-10-CM

## 2024-06-27 DIAGNOSIS — E78.5 HYPERLIPIDEMIA, UNSPECIFIED HYPERLIPIDEMIA TYPE: ICD-10-CM

## 2024-06-27 DIAGNOSIS — B20 HIV DISEASE (HCC): Primary | ICD-10-CM

## 2024-06-27 DIAGNOSIS — R73.03 PREDIABETES: ICD-10-CM

## 2024-06-27 PROCEDURE — 99214 OFFICE O/P EST MOD 30 MIN: CPT | Performed by: INTERNAL MEDICINE

## 2024-06-27 RX ORDER — BICTEGRAVIR SODIUM, EMTRICITABINE, AND TENOFOVIR ALAFENAMIDE FUMARATE 50; 200; 25 MG/1; MG/1; MG/1
1 TABLET ORAL
Qty: 90 TABLET | Refills: 0 | Status: SHIPPED | OUTPATIENT
Start: 2024-06-27

## 2024-07-06 DIAGNOSIS — N52.9 ERECTILE DYSFUNCTION, UNSPECIFIED ERECTILE DYSFUNCTION TYPE: ICD-10-CM

## 2024-07-08 RX ORDER — TADALAFIL 5 MG/1
5 TABLET ORAL EVERY MORNING
Qty: 90 TABLET | Refills: 2 | Status: SHIPPED | OUTPATIENT
Start: 2024-07-08

## 2024-07-16 ENCOUNTER — OFFICE VISIT (OUTPATIENT)
Dept: ENDOCRINOLOGY | Facility: CLINIC | Age: 34
End: 2024-07-16
Payer: COMMERCIAL

## 2024-07-16 VITALS
HEIGHT: 70 IN | SYSTOLIC BLOOD PRESSURE: 122 MMHG | BODY MASS INDEX: 32.87 KG/M2 | WEIGHT: 229.6 LBS | DIASTOLIC BLOOD PRESSURE: 82 MMHG

## 2024-07-16 DIAGNOSIS — E78.5 HYPERLIPIDEMIA, UNSPECIFIED HYPERLIPIDEMIA TYPE: ICD-10-CM

## 2024-07-16 DIAGNOSIS — E66.01 CLASS 2 SEVERE OBESITY DUE TO EXCESS CALORIES WITH SERIOUS COMORBIDITY AND BODY MASS INDEX (BMI) OF 37.0 TO 37.9 IN ADULT (HCC): ICD-10-CM

## 2024-07-16 DIAGNOSIS — R73.03 PREDIABETES: Primary | ICD-10-CM

## 2024-07-16 DIAGNOSIS — E55.9 VITAMIN D DEFICIENCY: ICD-10-CM

## 2024-07-16 PROCEDURE — 99214 OFFICE O/P EST MOD 30 MIN: CPT | Performed by: PHYSICIAN ASSISTANT

## 2024-07-16 NOTE — ASSESSMENT & PLAN NOTE
HE has been off the fenofibrate for a few months due to ED and feels that ED has improved.  Will repeat lipid panel and hopefully there will be some improvement due to significant weight loss.   He does not drink alcohol.

## 2024-07-16 NOTE — PROGRESS NOTES
Established Patient Progress Note     CC: Endocrinology follow up visit    Impression & Plan:    Problem List Items Addressed This Visit        Other    Prediabetes - Primary     This has improved with lifestyle modification, weight loss, and treatment with Wegovy.          Relevant Orders    Hemoglobin A1C    Vitamin D deficiency     Continue supplement, check Vitamin D level.          Relevant Orders    Vitamin D 25 hydroxy    Hyperlipidemia     HE has been off the fenofibrate for a few months due to ED and feels that ED has improved.  Will repeat lipid panel and hopefully there will be some improvement due to significant weight loss.   He does not drink alcohol.          Relevant Orders    Comprehensive metabolic panel    Lipid Panel with Direct LDL reflex    TSH, 3rd generation    T4, free    Class 2 severe obesity due to excess calories with serious comorbidity and body mass index (BMI) of 37.0 to 37.9 in adult (HCC)    Relevant Medications    Semaglutide-Weight Management (WEGOVY) 2.4 MG/0.75ML         History of Present Illness:     Zack Harper is a 34 y.o. male with a history of Prediabetes, dyslipidemia, Vitamin D Deficiency, and Obesity.     For the Obesity, he is taking Wegovy 2.4mg weekly.  Overall, he is tolerating medication with no major side effects.  He does notice mild orthostatic symptoms, but this is rare.  He has lost about 100 pounds over the past year.  He is following dietary plan (Saw Dietician in 2021) and exercising regularly 3-4x per dweek including weight training.  He has had body composition testing at the gym and has had gains in muscle mass.  Weight loss has platued over the past few weeks has he has been going to gym less frequently after recent sinus infection.     For Vitamin D Deficiency, he is taking Vit D 5,000 units daily.     For the hyperlipidemia, he has been off fenofibrate over the past few months as he read it can cause ED-- ED has improved off fenofibrate.        Patient Active Problem List   Diagnosis   • HIV disease ([facility])   • Folliculitis   • Prediabetes   • Vitamin D deficiency   • Constipation   • Bipolar 2 disorder ([facility])   • Hyperlipidemia   • ADHD (attention deficit hyperactivity disorder), inattentive type   • Asthma   • Class 2 severe obesity due to excess calories with serious comorbidity and body mass index (BMI) of 37.0 to 37.9 in adult ([facility])   • Muscle cramps   • Coccydynia   • Non-recurrent bilateral inguinal hernia without obstruction or gangrene      Past Medical History:   Diagnosis Date   • Abnormal liver function tests    • ADD (attention deficit disorder)    • Allergic    • Asthma    • Benign essential hypertension    • Depression    • Encounter for screening examination for sexually transmitted disease    • GERD (gastroesophageal reflux disease)    • HIV (human immunodeficiency virus infection) ([facility])    • Hypercholesteremia    • Hypertension    • Microhematuria    • Morbid obesity ([facility])    • Obesity    • Seasonal allergies       Past Surgical History:   Procedure Laterality Date   • TONSILLECTOMY        Family History   Problem Relation Age of Onset   • Hyperlipidemia Mother    • Mental illness Mother    • Depression Mother    • Asthma Mother    • Arthritis Mother    • Cancer Mother         skin   • Psychiatric Illness Mother    • Diabetes Father    • Hyperlipidemia Father    • Hypertension Father    • Heart disease Father    • Diabetes type II Father    • Alcohol abuse Father    • Mental illness Father    • Depression Father    • Bipolar disorder Father    • Diabetes Paternal Aunt    • Diabetes Paternal Uncle    • Cancer Maternal Grandfather         prostate   • Prostate cancer Maternal Grandfather    • Diabetes Paternal Grandmother    • Heart disease Paternal Grandmother    • ADD / ADHD Brother      Social History     Tobacco Use   • Smoking status: Former     Current packs/day: 0.00     Average packs/day: 0.5 packs/day for 10.0 years (5.0 ttl  pk-yrs)     Types: Cigarettes     Start date: 2009     Quit date: 2019     Years since quittin.8   • Smokeless tobacco: Never   Substance Use Topics   • Alcohol use: Yes     Comment: 1 drink per month     No Known Allergies    Current Outpatient Medications:   •  albuterol (PROVENTIL HFA,VENTOLIN HFA) 90 mcg/act inhaler, Inhale 1-2 puffs every 6 (six) hours as needed for wheezing, Disp: 6.7 g, Rfl: 3  •  ARIPiprazole (ABILIFY) 2 mg tablet, , Disp: , Rfl:   •  bictegravir-emtricitab-tenofovir alafenamide (Biktarvy) -25 MG tablet, Take 1 tablet by mouth daily with breakfast, Disp: 90 tablet, Rfl: 0  •  buPROPion (WELLBUTRIN XL) 300 mg 24 hr tablet, Take 300 mg by mouth daily, Disp: , Rfl:   •  Cholecalciferol (Vitamin D3) 125 MCG (5000 UT) CAPS, 1 cap daily, Disp: 90 capsule, Rfl: 3  •  indomethacin (INDOCIN SR) 75 mg CR capsule, Take 75 mg by mouth as needed, Disp: , Rfl:   •  Semaglutide-Weight Management (WEGOVY) 2.4 MG/0.75ML, 2.4 mg weekly, Disp: 9 mL, Rfl: 6  •  tadalafil (CIALIS) 20 MG tablet, Take 1 tablet by mouth daily as needed for erectile dysfunction., Disp: 20 tablet, Rfl: 0  •  tadalafil (CIALIS) 5 MG tablet, Take 1 tablet by mouth in the morning, Disp: 90 tablet, Rfl: 2    Review of Systems   Constitutional:  Negative for activity change, appetite change and fatigue.   HENT:  Negative for sore throat, trouble swallowing and voice change.    Eyes:  Negative for visual disturbance.   Respiratory:  Negative for choking, chest tightness and shortness of breath.    Cardiovascular:  Negative for chest pain, palpitations and leg swelling.   Gastrointestinal:  Negative for abdominal pain, constipation and diarrhea.   Endocrine: Negative for cold intolerance, heat intolerance, polydipsia, polyphagia and polyuria.   Genitourinary:  Negative for frequency.   Musculoskeletal:  Negative for arthralgias and myalgias.   Skin:  Negative for rash.   Neurological:  Positive for light-headedness  "(occasional lightheadness on standing, mild.). Negative for syncope.   Hematological:  Negative for adenopathy.   Psychiatric/Behavioral:  Negative for sleep disturbance.    All other systems reviewed and are negative.      Physical Exam:  Body mass index is 32.94 kg/m².  /82 (BP Location: Left arm, Patient Position: Sitting, Cuff Size: Adult)   Ht 5' 10\" (1.778 m)   Wt 104 kg (229 lb 9.6 oz)   BMI 32.94 kg/m²    Wt Readings from Last 3 Encounters:   07/16/24 104 kg (229 lb 9.6 oz)   06/27/24 103 kg (227 lb 6.4 oz)   06/06/24 115 kg (254 lb)       Physical Exam    Labs:       Discussed with the patient and all questioned fully answered. He will call me if any problems arise.    Follow-up appointment in 6 months.     Counseled patient on diagnostic results, prognosis, risk and benefit of treatment options, instruction for management, importance of treatment compliance, Risk  factor reduction and impressions    Magaly Sierra PA-C  "

## 2024-08-12 ENCOUNTER — TELEPHONE (OUTPATIENT)
Dept: ENDOCRINOLOGY | Facility: CLINIC | Age: 34
End: 2024-08-12

## 2024-08-12 NOTE — TELEPHONE ENCOUNTER
PA for Semaglutide-Weight Management (WEGOVY) 2.4 MG/0.75ML  Approved     Date(s) approved July 13, 2024 to August 12, 2025     Case #:[id number]     Patient advised by          [x] MyChart Message  [] Phone call   []LMOM  []L/M to call office as no active Communication consent on file  []Unable to leave detailed message as VM not approved on Communication consent       Pharmacy advised by    [x]Fax  []Phone call    Approval letter scanned into [employer] Yes

## 2024-08-12 NOTE — TELEPHONE ENCOUNTER
PA for Semaglutide-Weight Management (WEGOVY) 2.4 MG/0.75ML SUBMITTED     via    []CMM-KEY   [x][place]-Case ID # [id number]  []Faxed to plan   []Other website    []Phone call Case ID #      Office notes sent, clinical questions answered. Awaiting determination    Turnaround time for your insurance to make a decision on your Prior Authorization can take 7-21 business days.

## 2024-09-09 DIAGNOSIS — B20 HIV DISEASE (HCC): ICD-10-CM

## 2024-09-09 RX ORDER — BICTEGRAVIR SODIUM, EMTRICITABINE, AND TENOFOVIR ALAFENAMIDE FUMARATE 50; 200; 25 MG/1; MG/1; MG/1
1 TABLET ORAL
Qty: 90 TABLET | Refills: 0 | Status: SHIPPED | OUTPATIENT
Start: 2024-09-09

## 2024-09-10 ENCOUNTER — TELEPHONE (OUTPATIENT)
Age: 34
End: 2024-09-10

## 2024-09-10 NOTE — TELEPHONE ENCOUNTER
Patient called to reschedule today's follow up to schedule surgery. He was looking to push it back an hour, but per the office, Dr. Parish has appointments today and cannot accommodate the schedule change. Patient asked if it was possible to do a virtual, or schedule surgery without meeting in person. Told patient I would ask , but otherwise, we have appointment scheduled for him for 9/18 at 9:15 a.m.

## 2024-09-17 ENCOUNTER — TELEPHONE (OUTPATIENT)
Dept: SURGERY | Facility: CLINIC | Age: 34
End: 2024-09-17

## 2024-09-19 NOTE — TELEPHONE ENCOUNTER
Patient requested to speak with Olga Lidia. She was unavailable at this moment. Patient requests a call back today but after 2:00 p.m. Thank you.

## 2024-09-23 NOTE — TELEPHONE ENCOUNTER
Pt wants to schedule surgery and has been playing phone tag with [employer].  I tried the office but [employer] was not available.  I told pt I would have her call him back.

## 2024-09-25 NOTE — TELEPHONE ENCOUNTER
"Patient called again for [employer] but she was unavailable.  He's been playing \"phone tag\" for a week, he is requesting a call back today so he can get his hernia surgery scheduled.  Thanks.  "

## 2024-10-10 RX ORDER — DEXTROAMPHETAMINE SACCHARATE, AMPHETAMINE ASPARTATE MONOHYDRATE, DEXTROAMPHETAMINE SULFATE, AMPHETAMINE SULFATE 9.375; 9.375; 9.375; 9.375 MG/1; MG/1; MG/1; MG/1
CAPSULE, EXTENDED RELEASE ORAL
COMMUNITY

## 2024-10-10 NOTE — PRE-PROCEDURE INSTRUCTIONS
Pre-Surgery Instructions:   Medication Instructions    albuterol (PROVENTIL HFA,VENTOLIN HFA) 90 mcg/act inhaler Uses PRN- OK to take day of surgery    Amphet-Dextroamphet 3-Bead ER (Mydayis) 37.5 MG CP24 Hold day of surgery.    ARIPiprazole (ABILIFY) 2 mg tablet Take night before surgery    Biktarvy -25 MG tablet Take day of surgery.    buPROPion (WELLBUTRIN XL) 300 mg 24 hr tablet Take day of surgery.    Cholecalciferol (Vitamin D3) 125 MCG (5000 UT) CAPS Stop taking 7 days prior to surgery.    Semaglutide-Weight Management (WEGOVY) 2.4 MG/0.75ML Stop taking 7 days prior to surgery.    tadalafil (CIALIS) 20 MG tablet Hold day of surgery.    tadalafil (CIALIS) 5 MG tablet Hold day of surgery.      Medication instructions for day surgery reviewed. Please use only a sip of water to take your instructed medications. Avoid all over the counter vitamins, supplements and NSAIDS for one week prior to surgery per anesthesia guidelines. Tylenol is ok to take as needed.     You will receive a call one business day prior to surgery with an arrival time and hospital directions. If your surgery is scheduled on a Monday, the hospital will be calling you on the Friday prior to your surgery. If you have not heard from anyone by 8pm, please call the hospital supervisor through the hospital  at [phone]. ([place] [phone] or [place] [phone]).    Do not eat or drink anything after midnight the night before your surgery, including candy, mints, lifesavers, or chewing gum. Do not drink alcohol 24hrs before your surgery. Try not to smoke at least 24hrs before your surgery.       Follow the pre surgery showering instructions as listed in the “My Surgical Experience Booklet” or otherwise provided by your surgeon's office. Do not use a blade to shave the surgical area 1 week before surgery. It is okay to use a clean electric clippers up to 24 hours before surgery. Do not apply any lotions, creams, including  makeup, cologne, deodorant, or perfumes after showering on the day of your surgery. Do not use dry shampoo, hair spray, hair gel, or any type of hair products.     No contact lenses, eye make-up, or artificial eyelashes. Remove nail polish, including gel polish, and any artificial, gel, or acrylic nails if possible. Remove all jewelry including rings and body piercing jewelry.     Wear causal clothing that is easy to take on and off. Consider your type of surgery.    Keep any valuables, jewelry, piercings at home. Please bring any specially ordered equipment (sling, braces) if indicated.    Arrange for a responsible person to drive you to and from the hospital on the day of your surgery. Please confirm the visitor policy for the day of your procedure when you receive your phone call with an arrival time.     Call the surgeon's office with any new illnesses, exposures, or additional questions prior to surgery.    Please reference your “My Surgical Experience Booklet” for additional information to prepare for your upcoming surgery.

## 2024-10-15 ENCOUNTER — TELEPHONE (OUTPATIENT)
Age: 34
End: 2024-10-15

## 2024-10-15 NOTE — TELEPHONE ENCOUNTER
Patient called because he has surgery scheduled with Dr. Parish for Thursday, but says he wants to check in with office about surgery as he has had a GI infection/is not 100% yet. [place] [facility] [place]

## 2024-10-16 ENCOUNTER — ANESTHESIA EVENT (OUTPATIENT)
Dept: PERIOP | Facility: HOSPITAL | Age: 34
End: 2024-10-16
Payer: COMMERCIAL

## 2024-10-16 NOTE — ANESTHESIA PREPROCEDURE EVALUATION
Procedure:  REPAIR HERNIA INGUINAL, LAPAROSCOPIC (Bilateral: Groin)    Relevant Problems   CARDIO   (+) Hyperlipidemia      MUSCULOSKELETAL   (+) Coccydynia      PULMONARY   (+) Asthma      Behavioral Health   (+) ADHD (attention deficit hyperactivity disorder), inattentive type   (+) Bipolar 2 disorder (HCC)      Other   (+) HIV disease (HCC)   (+) Prediabetes      GERD  Hx [place]  Current smoker  HIV, on antiviral medication, undetectable viral load         Anesthesia Plan  ASA Score- 2     Anesthesia Type- general with ASA Monitors.         Additional Monitors:     Airway Plan: ETT.    Comment: GA with ETT, IV, antiemetics.       Plan Factors-    Chart reviewed.    Patient summary reviewed.    Patient is a current smoker.              Induction- intravenous.    Postoperative Plan- . Planned trial extubation        Informed Consent-

## 2024-10-17 ENCOUNTER — HOSPITAL ENCOUNTER (OUTPATIENT)
Facility: HOSPITAL | Age: 34
Setting detail: OUTPATIENT SURGERY
Discharge: HOME | End: 2024-10-17
Attending: SURGERY | Admitting: SURGERY
Payer: COMMERCIAL

## 2024-10-17 ENCOUNTER — ANESTHESIA (OUTPATIENT)
Dept: PERIOP | Facility: HOSPITAL | Age: 34
End: 2024-10-17
Payer: COMMERCIAL

## 2024-10-17 VITALS
DIASTOLIC BLOOD PRESSURE: 64 MMHG | WEIGHT: 229 LBS | HEART RATE: 87 BPM | RESPIRATION RATE: 18 BRPM | OXYGEN SATURATION: 96 % | HEIGHT: 70 IN | TEMPERATURE: 98.1 F | BODY MASS INDEX: 32.78 KG/M2 | SYSTOLIC BLOOD PRESSURE: 116 MMHG

## 2024-10-17 DIAGNOSIS — K40.20 NON-RECURRENT BILATERAL INGUINAL HERNIA WITHOUT OBSTRUCTION OR GANGRENE: Primary | ICD-10-CM

## 2024-10-17 PROCEDURE — C1781 MESH (IMPLANTABLE): HCPCS | Performed by: SURGERY

## 2024-10-17 PROCEDURE — 49650 LAP ING HERNIA REPAIR INIT: CPT | Performed by: SURGERY

## 2024-10-17 PROCEDURE — NC001 PR NO CHARGE: Performed by: SURGERY

## 2024-10-17 DEVICE — 3DMAX MESH, 10.8 CM X 16.0 CM (4.3" X 6.3"), LEFT, LARGE
Type: IMPLANTABLE DEVICE | Site: INGUINAL | Status: FUNCTIONAL
Brand: 3DMAX

## 2024-10-17 DEVICE — 3DMAX MESH, 10.8 CM X 16.0 CM (4.3" X 6.3"), RIGHT, LARGE
Type: IMPLANTABLE DEVICE | Site: INGUINAL | Status: FUNCTIONAL
Brand: 3DMAX

## 2024-10-17 RX ORDER — BUPIVACAINE HYDROCHLORIDE AND EPINEPHRINE 5; 5 MG/ML; UG/ML
INJECTION, SOLUTION PERINEURAL AS NEEDED
Status: DISCONTINUED | OUTPATIENT
Start: 2024-10-17 | End: 2024-10-17 | Stop reason: HOSPADM

## 2024-10-17 RX ORDER — ACETAMINOPHEN 10 MG/ML
INJECTION, SOLUTION INTRAVENOUS AS NEEDED
Status: DISCONTINUED | OUTPATIENT
Start: 2024-10-17 | End: 2024-10-17

## 2024-10-17 RX ORDER — PROPOFOL 10 MG/ML
INJECTION, EMULSION INTRAVENOUS AS NEEDED
Status: DISCONTINUED | OUTPATIENT
Start: 2024-10-17 | End: 2024-10-17

## 2024-10-17 RX ORDER — FENTANYL CITRATE/PF 50 MCG/ML
25 SYRINGE (ML) INJECTION
Status: DISCONTINUED | OUTPATIENT
Start: 2024-10-17 | End: 2024-10-17 | Stop reason: HOSPADM

## 2024-10-17 RX ORDER — KETOROLAC TROMETHAMINE 30 MG/ML
INJECTION, SOLUTION INTRAMUSCULAR; INTRAVENOUS AS NEEDED
Status: DISCONTINUED | OUTPATIENT
Start: 2024-10-17 | End: 2024-10-17

## 2024-10-17 RX ORDER — LIDOCAINE HYDROCHLORIDE 10 MG/ML
INJECTION, SOLUTION EPIDURAL; INFILTRATION; INTRACAUDAL; PERINEURAL AS NEEDED
Status: DISCONTINUED | OUTPATIENT
Start: 2024-10-17 | End: 2024-10-17

## 2024-10-17 RX ORDER — MIDAZOLAM HYDROCHLORIDE 2 MG/2ML
INJECTION, SOLUTION INTRAMUSCULAR; INTRAVENOUS AS NEEDED
Status: DISCONTINUED | OUTPATIENT
Start: 2024-10-17 | End: 2024-10-17

## 2024-10-17 RX ORDER — ONDANSETRON 2 MG/ML
INJECTION INTRAMUSCULAR; INTRAVENOUS AS NEEDED
Status: DISCONTINUED | OUTPATIENT
Start: 2024-10-17 | End: 2024-10-17

## 2024-10-17 RX ORDER — HYDROMORPHONE HCL/PF 1 MG/ML
0.5 SYRINGE (ML) INJECTION
Status: DISCONTINUED | OUTPATIENT
Start: 2024-10-17 | End: 2024-10-17 | Stop reason: HOSPADM

## 2024-10-17 RX ORDER — HYDROMORPHONE HCL/PF 1 MG/ML
SYRINGE (ML) INJECTION AS NEEDED
Status: DISCONTINUED | OUTPATIENT
Start: 2024-10-17 | End: 2024-10-17

## 2024-10-17 RX ORDER — HYDROMORPHONE HCL/PF 1 MG/ML
0.2 SYRINGE (ML) INJECTION EVERY 4 HOURS PRN
Status: DISCONTINUED | OUTPATIENT
Start: 2024-10-17 | End: 2024-10-17

## 2024-10-17 RX ORDER — HYDROCODONE BITARTRATE AND ACETAMINOPHEN 5; 325 MG/1; MG/1
1 TABLET ORAL EVERY 4 HOURS PRN
Status: DISCONTINUED | OUTPATIENT
Start: 2024-10-17 | End: 2024-10-17 | Stop reason: HOSPADM

## 2024-10-17 RX ORDER — PROPOFOL 10 MG/ML
INJECTION, EMULSION INTRAVENOUS CONTINUOUS PRN
Status: DISCONTINUED | OUTPATIENT
Start: 2024-10-17 | End: 2024-10-17

## 2024-10-17 RX ORDER — SODIUM CHLORIDE, SODIUM LACTATE, POTASSIUM CHLORIDE, CALCIUM CHLORIDE 600; 310; 30; 20 MG/100ML; MG/100ML; MG/100ML; MG/100ML
125 INJECTION, SOLUTION INTRAVENOUS CONTINUOUS
Status: DISCONTINUED | OUTPATIENT
Start: 2024-10-17 | End: 2024-10-17 | Stop reason: HOSPADM

## 2024-10-17 RX ORDER — ACETAMINOPHEN 325 MG/1
650 TABLET ORAL EVERY 6 HOURS PRN
Status: DISCONTINUED | OUTPATIENT
Start: 2024-10-17 | End: 2024-10-17 | Stop reason: HOSPADM

## 2024-10-17 RX ORDER — CEFAZOLIN SODIUM 1 G/3ML
INJECTION, POWDER, FOR SOLUTION INTRAMUSCULAR; INTRAVENOUS AS NEEDED
Status: DISCONTINUED | OUTPATIENT
Start: 2024-10-17 | End: 2024-10-17

## 2024-10-17 RX ORDER — DEXAMETHASONE SODIUM PHOSPHATE 10 MG/ML
INJECTION, SOLUTION INTRAMUSCULAR; INTRAVENOUS AS NEEDED
Status: DISCONTINUED | OUTPATIENT
Start: 2024-10-17 | End: 2024-10-17

## 2024-10-17 RX ORDER — PHENYLEPHRINE HCL IN 0.9% NACL 1 MG/10 ML
SYRINGE (ML) INTRAVENOUS AS NEEDED
Status: DISCONTINUED | OUTPATIENT
Start: 2024-10-17 | End: 2024-10-17

## 2024-10-17 RX ORDER — ROCURONIUM BROMIDE 10 MG/ML
INJECTION, SOLUTION INTRAVENOUS AS NEEDED
Status: DISCONTINUED | OUTPATIENT
Start: 2024-10-17 | End: 2024-10-17

## 2024-10-17 RX ORDER — OXYCODONE HYDROCHLORIDE 5 MG/1
5 TABLET ORAL EVERY 4 HOURS PRN
Qty: 10 TABLET | Refills: 0 | Status: SHIPPED | OUTPATIENT
Start: 2024-10-17

## 2024-10-17 RX ORDER — FENTANYL CITRATE 50 UG/ML
INJECTION, SOLUTION INTRAMUSCULAR; INTRAVENOUS AS NEEDED
Status: DISCONTINUED | OUTPATIENT
Start: 2024-10-17 | End: 2024-10-17

## 2024-10-17 RX ADMIN — ONDANSETRON 4 MG: 2 INJECTION INTRAMUSCULAR; INTRAVENOUS at 08:53

## 2024-10-17 RX ADMIN — MIDAZOLAM 2 MG: 1 INJECTION INTRAMUSCULAR; INTRAVENOUS at 07:38

## 2024-10-17 RX ADMIN — PROPOFOL 100 MCG/KG/MIN: 10 INJECTION, EMULSION INTRAVENOUS at 07:52

## 2024-10-17 RX ADMIN — HYDROMORPHONE HYDROCHLORIDE 0.5 MG: 1 INJECTION, SOLUTION INTRAMUSCULAR; INTRAVENOUS; SUBCUTANEOUS at 08:52

## 2024-10-17 RX ADMIN — FENTANYL CITRATE 50 MCG: 50 INJECTION INTRAMUSCULAR; INTRAVENOUS at 07:45

## 2024-10-17 RX ADMIN — DEXAMETHASONE SODIUM PHOSPHATE 10 MG: 10 INJECTION, SOLUTION INTRAMUSCULAR; INTRAVENOUS at 07:46

## 2024-10-17 RX ADMIN — PROPOFOL 300 MG: 10 INJECTION, EMULSION INTRAVENOUS at 07:45

## 2024-10-17 RX ADMIN — DEXMEDETOMIDINE 4 MCG: 100 INJECTION, SOLUTION INTRAVENOUS at 08:29

## 2024-10-17 RX ADMIN — DEXMEDETOMIDINE 4 MCG: 100 INJECTION, SOLUTION INTRAVENOUS at 08:32

## 2024-10-17 RX ADMIN — DEXMEDETOMIDINE 8 MCG: 100 INJECTION, SOLUTION INTRAVENOUS at 08:37

## 2024-10-17 RX ADMIN — HYDROMORPHONE HYDROCHLORIDE 0.5 MG: 1 INJECTION, SOLUTION INTRAMUSCULAR; INTRAVENOUS; SUBCUTANEOUS at 08:10

## 2024-10-17 RX ADMIN — LIDOCAINE HYDROCHLORIDE 50 MG: 10 INJECTION, SOLUTION EPIDURAL; INFILTRATION; INTRACAUDAL; PERINEURAL at 07:45

## 2024-10-17 RX ADMIN — ACETAMINOPHEN 1000 MG: 10 INJECTION INTRAVENOUS at 08:41

## 2024-10-17 RX ADMIN — KETOROLAC TROMETHAMINE 15 MG: 30 INJECTION, SOLUTION INTRAMUSCULAR; INTRAVENOUS at 08:51

## 2024-10-17 RX ADMIN — PROPOFOL 100 MG: 10 INJECTION, EMULSION INTRAVENOUS at 07:47

## 2024-10-17 RX ADMIN — ROCURONIUM BROMIDE 20 MG: 10 INJECTION, SOLUTION INTRAVENOUS at 08:11

## 2024-10-17 RX ADMIN — FENTANYL CITRATE 50 MCG: 50 INJECTION INTRAMUSCULAR; INTRAVENOUS at 08:02

## 2024-10-17 RX ADMIN — DEXMEDETOMIDINE 4 MCG: 100 INJECTION, SOLUTION INTRAVENOUS at 08:42

## 2024-10-17 RX ADMIN — PROPOFOL 40 MG: 10 INJECTION, EMULSION INTRAVENOUS at 08:44

## 2024-10-17 RX ADMIN — ROCURONIUM BROMIDE 10 MG: 10 INJECTION, SOLUTION INTRAVENOUS at 08:28

## 2024-10-17 RX ADMIN — Medication 100 MCG: at 08:07

## 2024-10-17 RX ADMIN — SUGAMMADEX 200 MG: 100 INJECTION, SOLUTION INTRAVENOUS at 08:57

## 2024-10-17 RX ADMIN — ROCURONIUM BROMIDE 50 MG: 10 INJECTION, SOLUTION INTRAVENOUS at 07:46

## 2024-10-17 RX ADMIN — SODIUM CHLORIDE, SODIUM LACTATE, POTASSIUM CHLORIDE, AND CALCIUM CHLORIDE: .6; .31; .03; .02 INJECTION, SOLUTION INTRAVENOUS at 07:41

## 2024-10-17 RX ADMIN — CEFAZOLIN 2000 MG: 1 INJECTION, POWDER, FOR SOLUTION INTRAMUSCULAR; INTRAVENOUS at 07:59

## 2024-10-17 NOTE — ANESTHESIA POSTPROCEDURE EVALUATION
Post-Op Assessment Note    CV Status:  Stable  Pain Score: 3    Pain management: satisfactory to patient       Mental Status:  Awake and arousable   Hydration Status:  Stable   PONV Controlled:  None   Airway Patency:  Patent     Post Op Vitals Reviewed: Yes    No anethesia notable event occurred.    Staff: CRNA           Last Filed PACU Vitals:  Vitals Value Taken Time   Temp 97.5F    Pulse 86 10/17/24 0908   /67 10/17/24 0908   Resp 14 10/17/24 0908   SpO2 97 % 10/17/24 0908   Vitals shown include unfiled device data.    Modified Samuel:  No data recorded

## 2024-10-17 NOTE — DISCHARGE INSTR - AVS FIRST PAGE
After Surgery Instructions    ???When you return home, you may eat whatever you feel comfortable eating. It is common to not have much of an appetite. Do not force yourself to eat. Your appetite will usually return in 1 or 2 weeks. Some foods may still not agree with you, but this will usually pass in 4 to 6 weeks.  ???Resume all of your regular medications, including blood thinners and aspirin, after going home.  ???The day after surgery you may remove the dressings. Leave any skin tapes on the incisions in place. A dressing is then optional.  ???You may shower the day after surgery. Plain soap and water is fine. Special soaps, ointments, alcohol or peroxide is not necessary. No swimming in pools for 5 days, and do not go in the ocean until seen in the office.  ???You may become constipated, especially if taking pain medications, for the first 3 or 4 days. You may take any over the counter laxative. Cavazos Milk of Magnesia is good to start.  ???You will be given a prescription for pain medication. Take it as needed only. You may also take any over the counter medication for more mild pain.  ???Immediately after surgery, you may ride in a car, climb steps and walk as tolerated. When you are pain free, it is Ok to drive. Be aware that you will tire out very easily for the first few days.  ???Do not lift anything over 15 pounds for one week. Otherwise, there are not specific limitations to your activity and you may resume normal activity as tolerated.   After surgery you may return back to work on 1-2 weeks if comfortable      Contact [facility] at [phone] if any of the following occur:    ???A fever over 101° that does not respond to Tylenol. A low grade fever is not unusual after surgery and is not necessarily a sign of infection.  ???Increasing abdominal pain. Some pain after surgery is normal. Pain that was improving but has now gotten increasingly worse may not be normal and should be  reported  ???Persistent nausea and vomiting.      IF YOU HAVE ANY QUESTIONS OR CONCERNS PLEASE FEEL FREE TO CONTACT US AT ANY TIME.

## 2024-10-17 NOTE — ANESTHESIA POSTPROCEDURE EVALUATION
Post-Op Assessment Note    CV Status:  Stable  Pain Score: 0    Pain management: adequate       Mental Status:  Alert and awake   Hydration Status:  Stable   PONV Controlled:  None   Airway Patency:  Patent  Airway: intubated     Post Op Vitals Reviewed: Yes    No anethesia notable event occurred.    Staff: Anesthesiologist           Last Filed PACU Vitals:  Vitals Value Taken Time   Temp 97.6 °F (36.4 °C) 10/17/24 0930   Pulse 83 10/17/24 0942   /58 10/17/24 0939   Resp 19 10/17/24 0941   SpO2 94 % 10/17/24 0942   Vitals shown include unfiled device data.    Modified Samuel:  Activity: 2 (10/17/2024  9:30 AM)  Respiration: 2 (10/17/2024  9:30 AM)  Circulation: 2 (10/17/2024  9:30 AM)  Consciousness: 2 (10/17/2024  9:30 AM)  Oxygen Saturation: 2 (10/17/2024  9:30 AM)  Modified Samuel Score: 10 (10/17/2024  9:30 AM)

## 2024-10-17 NOTE — H&P
Expand All Collapse All    Ambulatory Visit  Name: Zack Harper      : 1990      MRN: [id number]  Encounter Provider: Kristian Murcia MD  Encounter Date: 2024   Encounter department: [facility] [place]        Assessment & Plan  1. Non-recurrent bilateral inguinal hernia without obstruction or gangrene  Assessment & Plan:  Talked to him about what a hernia is and how it would be repaired.  Told him with bilateral's I usually do this laparoscopically or robotically.  I discussed the procedure in detail including risks, benefits, alternatives, and what to expect postoperatively.  He would like to wait till September so he was given instructions on signs and symptoms of incarceration and strangulation.  Will see him back in September and set things up.              History of Present Illness     Zack Harper is a 34 y.o. male who presents for hernia evaluation.  He has noted a lump right side more than left.  Occasional discomfort when he is doing things such as coughing or sneezing.  No change in bowel or bladder habits.     Review of Systems   Constitutional:  Negative for chills, fatigue and fever.   HENT:  Negative for congestion, ear pain, sneezing, trouble swallowing and voice change.    Eyes:  Negative for pain and discharge.   Respiratory:  Negative for cough, shortness of breath and wheezing.    Cardiovascular:  Negative for palpitations and leg swelling.   Gastrointestinal:  Negative for abdominal pain, constipation, diarrhea, nausea and vomiting.   Endocrine: Negative for cold intolerance, heat intolerance and polyuria.   Genitourinary:  Negative for decreased urine volume, difficulty urinating and dysuria.   Musculoskeletal:  Negative for arthralgias and back pain.   Skin:  Negative for rash and wound.   Allergic/Immunologic: Negative for environmental allergies and food allergies.   Neurological:  Negative for dizziness and weakness.   Hematological:  Negative for  "adenopathy. Does not bruise/bleed easily.   Psychiatric/Behavioral:  Negative for confusion and sleep disturbance. The patient is not nervous/anxious.    All other systems reviewed and are negative.              Objective[]Expand by Default     There were no vitals taken for this visit.     Physical Exam  Constitutional:       General: He is not in acute distress.     Appearance: He is well-developed. He is not diaphoretic.   HENT:      Head: Normocephalic and atraumatic.      Right Ear: External ear normal.      Left Ear: External ear normal.   Eyes:      General: No scleral icterus.     Conjunctiva/sclera: Conjunctivae normal.   Neck:      Thyroid: No thyromegaly.      Trachea: No tracheal deviation.   Cardiovascular:      Rate and Rhythm: Normal rate and regular rhythm.      Heart sounds: Normal heart sounds. No murmur heard.  Pulmonary:      Effort: Pulmonary effort is normal. No respiratory distress.      Breath sounds: Normal breath sounds. No wheezing or rales.   Abdominal:      General: There is no distension.      Palpations: Abdomen is soft. There is no mass.      Tenderness: There is no abdominal tenderness. There is no guarding or rebound.      Comments: Diastases recti upper abdomen  Right inguinal hernia easily reducible  Left inguinal hernia easily reducible   Musculoskeletal:         General: Normal range of motion.      Cervical back: Normal range of motion and neck supple.   Lymphadenopathy:      Cervical: No cervical adenopathy.   Skin:     General: Skin is warm and dry.   Neurological:      Mental Status: He is alert and oriented to person, place, and time.   Psychiatric:         Behavior: Behavior normal.         Thought Content: Thought content normal.         Judgment: Judgment normal.             /65   Pulse 71   Temp (!) 97.4 °F (36.3 °C) (Temporal)   Resp 18   Ht 5' 10\" (1.778 m)   Wt 104 kg (229 lb)   SpO2 96%   BMI 32.86 kg/m²     "

## 2024-10-17 NOTE — OP NOTE
OPERATIVE REPORT  PATIENT NAME: Zack Harper    :  1990  MRN: [id number]  Pt Location:  OR ROOM 03    SURGERY DATE: 10/17/2024    Surgeons and Role:     * Kristian Murcia MD - Primary     * David Valencia MD - Assisting    Preop Diagnosis:  Bilateral inguinal hernia [K40.20]    Post-Op Diagnosis Codes:     * Bilateral inguinal hernia [K40.20]    Procedure(s):  Bilateral - LAPAROSCOPIC INGUINAL HERNIA REPAIR    Specimen(s):  * No specimens in log *    Estimated Blood Loss:   Minimal    Drains:  * No LDAs found *    Anesthesia Type:   General    Operative Indications:  Bilateral inguinal hernia [K40.20]      Operative Findings:  Bilateral direct inguinal hernias      Complications:   None    Procedure and Technique:  Patient was identified by visualization and conversation on the operating room table.  After satisfactory induction of general anesthesia, patient's abdomen was prepped and draped in usual sterile fashion.  Timeout taken.  Local anesthesia of 0.5% Marcaine with epi was infiltrated into the skin subcutaneous tissue and deeper layers tissue at the inferior edge of the umbilicus.  This incision was made.  Sharp and blunt dissection was carried down to the fascia which was dissected free.  A 0 Vicryl suture was placed in a pursestring fashion in the fascia.  Knife was used the fascia opened.  Hemostat was introduced and placed into the preperitoneal space.  12 mm trocar was introduced into the preperitoneal space.  Laparoscope was placed gas insufflated.  The extraperitoneal space was opened up bluntly.  In the lower midline 2 areas were infiltrated with local incisions made and 5 mm trocar introduced under direct vision.  Beginning on the right, blunt dissection was carried out to open up the preperitoneal space down until the cord structures were identified.  In so doing, the cord was skeletonized some of the peritoneal fat going into the cord was stripped backwards.  The peritoneum was  also stripped backwards.  There was noted to be a defect in the direct space.  The entire area was cleared off.  Attention was then turned to the left side where again blunt dissection carried out into the preperitoneal space opening up the space and dissecting down to the abdominal wall.  There was another direct defect noted.  There is also somewhat of a indirect sac.  This peritoneum was dissected off the cord as was the lipoma of the cord.  Once everything was freed up the mesh was placed over this area and tacked to the pubic bone with capture.  Another mesh again a Bard 3D max large was placed on the right side and affixed to the pubic bone.  On the left side another attack was used to affix the mesh to the rectus muscle and there is second sutures placed to the pubic bone.  This point the mesh was held up against the abdominal wall the gas allowed to escape and the peritoneum came up over the mesh in usual fashion.  The trocars were removed.  The pursestring suture originally placed was tied down to close the fascia at that site.  A 2-0 Vicryl suture was used close the fascia and subcutaneous tissue of the other 2 incision sites.  Skin of all the incisions closed with subcuticular 4-0 Monocryl.  Glue dressing applied.  Patient is awakened taken recovery in satisfactory.  Instrument needle sponge counts were correct. RF wanding was clear.   I was present for the entire procedure.    Patient Disposition:  PACU              SIGNATURE: Kristian Murcia MD  DATE: October 17, 2024  TIME: 9:04 AM

## 2024-11-13 DIAGNOSIS — N52.9 ERECTILE DYSFUNCTION, UNSPECIFIED ERECTILE DYSFUNCTION TYPE: ICD-10-CM

## 2024-11-13 RX ORDER — TADALAFIL 20 MG/1
TABLET ORAL
Qty: 20 TABLET | Refills: 0 | Status: SHIPPED | OUTPATIENT
Start: 2024-11-13

## 2024-11-13 RX ORDER — TADALAFIL 5 MG/1
5 TABLET ORAL EVERY MORNING
Qty: 90 TABLET | Refills: 0 | Status: SHIPPED | OUTPATIENT
Start: 2024-11-13

## 2024-11-21 DIAGNOSIS — B20 HIV DISEASE (HCC): ICD-10-CM

## 2024-11-21 RX ORDER — BICTEGRAVIR SODIUM, EMTRICITABINE, AND TENOFOVIR ALAFENAMIDE FUMARATE 50; 200; 25 MG/1; MG/1; MG/1
1 TABLET ORAL
Qty: 90 TABLET | Refills: 0 | Status: SHIPPED | OUTPATIENT
Start: 2024-11-21

## 2024-12-20 ENCOUNTER — TELEPHONE (OUTPATIENT)
Dept: INFECTIOUS DISEASES | Facility: CLINIC | Age: 34
End: 2024-12-20

## 2024-12-20 NOTE — TELEPHONE ENCOUNTER
Called and LM with reminder to get blood work done before next appt Fri 12/27.Told to call with any questions.

## 2024-12-23 NOTE — TELEPHONE ENCOUNTER
Contacted Tessa (Patient's Mother) as we've been unable to contact patient directly, and expressed to her that he has labs due prior to his upcoming appointment, 12/27 at 1pm.    Tessa stated she would relay this information to her son, and I provided my name along with office callback number in case they may have any questions or concerns.

## 2025-01-14 NOTE — PROGRESS NOTES
[facility] INTERNAL MEDICINE- [place]  OFFICE VISIT     PATIENT INFORMATION     Zack Harper   34 y.o. male   MRN: [id number]    ASSESSMENT/PLAN     Assessment & Plan  Bipolar 2 disorder (HCC)  Mood presently stable on current regimen of Abilify 2 mg daily at bedtime and Wellbutrin 300 mg daily.  Patient is established with a psychiatrist and continues to see them regularly.  Continue current regimen and psychiatry follow-up.       HIV disease (HCC)  Patient has history of HIV disease, has never been diagnosed with AIDS.  Sees Dr. Decker with infectious disease regularly.  Patient does well with Biktarvy and has had undetectable viral loads and CD4 counts greater than the thousand in the past.  Continue antiretroviral therapy and infectious disease follow-up.       Erectile dysfunction, unspecified erectile dysfunction type    Orders:    tadalafil (CIALIS) 5 MG tablet; Take 1 tablet (5 mg total) by mouth every morning    tadalafil (CIALIS) 20 MG tablet; Take 1 tablet by mouth daily as needed for erectile dysfunction.    Mild intermittent asthma without complication  Patient reports longstanding history of asthma.  States that it has been well-controlled and he will only occasionally get flares of his asthma during the winter months or if he has an upper respiratory infection.  Because of this, he states that he only uses his albuterol inhaler a few times each year.  Continue albuterol as needed.       ADHD (attention deficit hyperactivity disorder), inattentive type  Patient has been diagnosed ADHD and sees psychiatry regularly.  He is currently being treated with Mydayis without side effects.  Continue current regimen and psychiatry follow-up.       Coccydynia  Patient states he has longstanding history of coccydynia after sustaining injury from falling off a horse many years ago.  He will occasionally get flares and uses a he will occasionally get flares and uses a cushioned pillow while seated.    He is able  to manage his flares with over-the-counter NSAIDs.       Prediabetes  Patient was diagnosed with prediabetes in the past with A1c as high as 5.7%.  He established with endocrinology and is current being treated for prediabetes and for obesity with Wegovy.  Most recent A1c is now 4.7%.       Vitamin D deficiency  Patient with history of vitamin D deficiency.  He is currently taking vitamin D supplements.  Most recent vitamin D now within normal range.  Continue supplementation.       Hyperlipidemia, unspecified hyperlipidemia type  Patient has a history of hyperlipidemia including hypertriglyceridemia.  He states that he was on a statin in the past and had myalgias and that he also took fenofibrate in the past although this gave him side effects.  He is not currently taking any cholesterol-lowering medications.  Reviewed most recent lipid panel with patient with total cholesterol 204, triglycerides of 251, LDL at 124.  Patient also admits that his diet has not been ideal.   ASCVD risk for patient is 1.8%.  Low risk for atherosclerotic disease at this time.  Advised dietary and exercise modifications to help improve lipids.  This also seems to been improved with his weight loss.  Will continue to monitor lipid profile and ASCVD risk.  Will continue without cholesterol-lowering medication at this time.       Class 2 severe obesity due to excess calories with serious comorbidity and body mass index (BMI) of 37.0 to 37.9 in adult (HCC)  Patient with history of obesity.  Reports over 100 pound weight loss with Wegovy.  Currently sees endocrinology for prediabetes and obesity.  Will continue to follow with them.  Encouraged continued dietary and exercise modifications to support weight loss.       Non-recurrent bilateral inguinal hernia without obstruction or gangrene  Patient had recent admission to hospital on 10/17/2024 for laparoscopic inguinal hernia repair.  States that there were no complications and that he has  surgical wounds have healed well with no ongoing issues.            HEALTH MAINTENANCE     Immunization History   Administered Date(s) Administered    COVID-19 PFIZER VACCINE 0.3 ML IM 02/24/2021, 03/19/2021, 09/30/2021    COVID-19 Pfizer Vac BIVALENT Anton-sucrose 12 Yr+ IM 01/07/2023, 11/10/2023    DT (pediatric) 06/15/2004    DTP 1990, [birth year], 09/30/1991, 03/14/1995    Hep B, Adolescent or Pediatric 08/04/1992, 09/17/1992, 03/24/1993    HiB 07/01/1991    INFLUENZA 10/28/2013, 11/19/2020, 12/15/2022    Influenza, high dose seasonal 0.7 mL 12/07/2023    Influenza, injectable, quadrivalent, preservative free 0.5 mL 01/12/2022    Influenza, recombinant, quadrivalent,injectable, preservative free 11/14/2019, 11/20/2020, 12/15/2022    MMR 07/01/1991, 03/08/1994    Meningococcal MCV4P 08/22/2006, 08/15/2019    OPV 1990, 1990, 09/30/1991, 03/14/1995    Pneumococcal Conjugate 13-Valent 02/13/2020, 06/11/2020    Pneumococcal Polysaccharide PPV23 10/11/2012    Smallpox Monkeypox Vaccine, Live Attenuated, Preservative-Free 08/16/2022    Tdap 07/21/2020, 07/22/2020    Tuberculin Skin Test-PPD Intradermal 03/13/1991         CHIEF COMPLAINT     Chief Complaint   Patient presents with    Establish Care      HISTORY OF PRESENT ILLNESS     Mr. Zack Harper is a 34-year-old male with past medical history of asthma, bipolar 2 disorder, ADHD, HIV disease, vitamin D deficiency, hyperlipidemia, obesity.  Patient presents to clinic today to establish care.  Patient's previous primary care physician was a [facility] internal medicine physician who is since left practice.  Patient was last seen by his previous PCP on 06/06/2024.  At that time he was having sinus symptoms for past few weeks.  He was treated for acute recurrent pansinusitis with amoxicillin instructed to follow-up with ENT.  For his HIV disease he was compliant with antiretroviral therapy and sees ID for ongoing care.  For his bipolar 2  disorder he is seeing psychiatry and this was stable and he was continued on current regimen as well.  On 10/17/2024 he was admitted to the hospital for laparoscopic inguinal hernia repair which was successful.    REVIEW OF SYSTEMS     Review of Systems   Constitutional:  Negative for chills and fever.   HENT:  Negative for congestion and rhinorrhea.    Respiratory:  Negative for cough, shortness of breath and wheezing.    Cardiovascular:  Negative for chest pain and leg swelling.   Gastrointestinal:  Negative for abdominal pain, constipation, diarrhea, nausea and vomiting.   Genitourinary:  Negative for difficulty urinating and dysuria.   Musculoskeletal:  Negative for arthralgias and back pain.   Skin:  Negative for rash and wound.   Neurological:  Negative for dizziness, weakness and headaches.   Psychiatric/Behavioral:  Negative for agitation, behavioral problems and confusion.      OBJECTIVE     Vitals:    01/15/25 1116   BP: 124/82   Pulse: 77   SpO2: 98%   Weight: 111 kg (245 lb)     Physical Exam  Constitutional:       Appearance: Normal appearance.   HENT:      Right Ear: External ear normal.      Left Ear: External ear normal.   Eyes:      Extraocular Movements: Extraocular movements intact.      Conjunctiva/sclera: Conjunctivae normal.      Pupils: Pupils are equal, round, and reactive to light.   Cardiovascular:      Rate and Rhythm: Normal rate and regular rhythm.      Pulses: Normal pulses.      Heart sounds: Normal heart sounds. No murmur heard.  Pulmonary:      Effort: Pulmonary effort is normal. No respiratory distress.      Breath sounds: Normal breath sounds. No wheezing, rhonchi or rales.   Abdominal:      General: Abdomen is flat. Bowel sounds are normal. There is no distension.      Palpations: Abdomen is soft.      Tenderness: There is no abdominal tenderness.   Musculoskeletal:      Right lower leg: No edema.      Left lower leg: No edema.   Skin:     General: Skin is warm and dry.    Neurological:      Mental Status: He is alert and oriented to person, place, and time.   Psychiatric:         Mood and Affect: Mood normal.         Behavior: Behavior normal.         Thought Content: Thought content normal.       CURRENT MEDICATIONS     Current Outpatient Medications:     polymyxin b-trimethoprim (POLYTRIM) ophthalmic solution, , Disp: , Rfl:     tadalafil (CIALIS) 20 MG tablet, Take 1 tablet by mouth daily as needed for erectile dysfunction., Disp: 20 tablet, Rfl: 0    tadalafil (CIALIS) 5 MG tablet, Take 1 tablet (5 mg total) by mouth every morning, Disp: 90 tablet, Rfl: 0    albuterol (PROVENTIL HFA,VENTOLIN HFA) 90 mcg/act inhaler, Inhale 1-2 puffs every 6 (six) hours as needed for wheezing, Disp: 6.7 g, Rfl: 3    Amphet-Dextroamphet 3-Bead ER (Mydayis) 37.5 MG CP24, Take by mouth, Disp: , Rfl:     ARIPiprazole (ABILIFY) 2 mg tablet, daily at bedtime, Disp: , Rfl:     bictegravir-emtricitab-tenofovir alafenamide (Biktarvy) -25 MG tablet, Take 1 tablet by mouth daily with breakfast, Disp: 90 tablet, Rfl: 0    buPROPion (WELLBUTRIN XL) 300 mg 24 hr tablet, Take 300 mg by mouth daily, Disp: , Rfl:     Cholecalciferol (Vitamin D3) 125 MCG (5000 UT) CAPS, 1 cap daily, Disp: 90 capsule, Rfl: 3    Semaglutide-Weight Management (WEGOVY) 2.4 MG/0.75ML, 2.4 mg weekly, Disp: 9 mL, Rfl: 6    PAST MEDICAL & SURGICAL HISTORY     Past Medical History:   Diagnosis Date    Abnormal liver function tests     ADD (attention deficit disorder)     Allergic     Asthma     Benign essential hypertension     Depression     Encounter for screening examination for sexually transmitted disease     GERD (gastroesophageal reflux disease)     HIV (human immunodeficiency virus infection) (HCC)     Hypercholesteremia     Hypertension     Microhematuria     Morbid obesity (HCC)     Obesity     Seasonal allergies     Sleep apnea     resolved with sx     Past Surgical History:   Procedure Laterality Date    IN LAPAROSCOPY  SURG RPR INITIAL INGUINAL HERNIA Bilateral 10/17/2024    Procedure: LAPAROSCOPIC INGUINAL HERNIA REPAIR;  Surgeon: Kristian Murcia MD;  Location: [facility];  Service: General    TONSILLECTOMY       SOCIAL & FAMILY HISTORY     Social History     Socioeconomic History    Marital status: /Civil Union     Spouse name: Not on file    Number of children: Not on file    Years of education: Not on file    Highest education level: Not on file   Occupational History    Not on file   Tobacco Use    Smoking status: Former     Current packs/day: 0.00     Average packs/day: 0.5 packs/day for 10.0 years (5.0 ttl pk-yrs)     Types: Cigarettes     Start date: 2009     Quit date: 2019     Years since quittin.3    Smokeless tobacco: Never   Vaping Use    Vaping status: Some Days    Substances: Nicotine, Flavoring   Substance and Sexual Activity    Alcohol use: Yes     Comment: 1 drink per month    Drug use: Yes     Frequency: 7.0 times per week     Types: Marijuana     Comment: medical-    Sexual activity: Yes     Partners: Male     Birth control/protection: None   Other Topics Concern    Not on file   Social History Narrative    Not on file     Social Drivers of Health     Financial Resource Strain: Not on file   Food Insecurity: Not on file   Transportation Needs: Not on file   Physical Activity: Not on file   Stress: Not on file   Social Connections: Not on file   Intimate Partner Violence: Not on file   Housing Stability: Not on file     Social History     Substance and Sexual Activity   Alcohol Use Yes    Comment: 1 drink per month       Social History     Substance and Sexual Activity   Drug Use Yes    Frequency: 7.0 times per week    Types: Marijuana    Comment: medical-     Social History     Tobacco Use   Smoking Status Former    Current packs/day: 0.00    Average packs/day: 0.5 packs/day for 10.0 years (5.0 ttl pk-yrs)    Types: Cigarettes    Start date: 2009    Quit date: 2019    Years since  quittin.3   Smokeless Tobacco Never     Family History   Problem Relation Age of Onset    Hyperlipidemia Mother     Mental illness Mother     Depression Mother     Asthma Mother     Arthritis Mother     Cancer Mother         skin    Psychiatric Illness Mother     Diabetes Father     Hyperlipidemia Father     Hypertension Father     Heart disease Father     Diabetes type II Father     Alcohol abuse Father     Mental illness Father     Depression Father     Bipolar disorder Father     Diabetes Paternal Aunt     Diabetes Paternal Uncle     Cancer Maternal Grandfather         prostate    Prostate cancer Maternal Grandfather     Diabetes Paternal Grandmother     Heart disease Paternal Grandmother     ADD / ADHD Brother      ==  Glenn Chowdary DO  [facility]  [address] [place] [id number]  Office: [phone]  Fax: [phone]

## 2025-01-15 ENCOUNTER — OFFICE VISIT (OUTPATIENT)
Age: 35
End: 2025-01-15
Payer: COMMERCIAL

## 2025-01-15 ENCOUNTER — RESULTS FOLLOW-UP (OUTPATIENT)
Dept: ENDOCRINOLOGY | Facility: CLINIC | Age: 35
End: 2025-01-15

## 2025-01-15 VITALS
WEIGHT: 245 LBS | DIASTOLIC BLOOD PRESSURE: 82 MMHG | BODY MASS INDEX: 35.15 KG/M2 | OXYGEN SATURATION: 98 % | SYSTOLIC BLOOD PRESSURE: 124 MMHG | HEART RATE: 77 BPM

## 2025-01-15 DIAGNOSIS — F90.0 ADHD (ATTENTION DEFICIT HYPERACTIVITY DISORDER), INATTENTIVE TYPE: ICD-10-CM

## 2025-01-15 DIAGNOSIS — N52.9 ERECTILE DYSFUNCTION, UNSPECIFIED ERECTILE DYSFUNCTION TYPE: ICD-10-CM

## 2025-01-15 DIAGNOSIS — B20 HIV DISEASE (HCC): ICD-10-CM

## 2025-01-15 DIAGNOSIS — K40.20 NON-RECURRENT BILATERAL INGUINAL HERNIA WITHOUT OBSTRUCTION OR GANGRENE: ICD-10-CM

## 2025-01-15 DIAGNOSIS — E78.5 HYPERLIPIDEMIA, UNSPECIFIED HYPERLIPIDEMIA TYPE: ICD-10-CM

## 2025-01-15 DIAGNOSIS — M53.3 COCCYDYNIA: ICD-10-CM

## 2025-01-15 DIAGNOSIS — E66.01 CLASS 2 SEVERE OBESITY DUE TO EXCESS CALORIES WITH SERIOUS COMORBIDITY AND BODY MASS INDEX (BMI) OF 37.0 TO 37.9 IN ADULT (HCC): ICD-10-CM

## 2025-01-15 DIAGNOSIS — E55.9 VITAMIN D DEFICIENCY: ICD-10-CM

## 2025-01-15 DIAGNOSIS — R73.03 PREDIABETES: ICD-10-CM

## 2025-01-15 DIAGNOSIS — F31.81 BIPOLAR 2 DISORDER (HCC): Primary | ICD-10-CM

## 2025-01-15 DIAGNOSIS — J45.20 MILD INTERMITTENT ASTHMA WITHOUT COMPLICATION: ICD-10-CM

## 2025-01-15 DIAGNOSIS — E66.812 CLASS 2 SEVERE OBESITY DUE TO EXCESS CALORIES WITH SERIOUS COMORBIDITY AND BODY MASS INDEX (BMI) OF 37.0 TO 37.9 IN ADULT (HCC): ICD-10-CM

## 2025-01-15 PROBLEM — K59.00 CONSTIPATION: Status: RESOLVED | Noted: 2020-10-27 | Resolved: 2025-01-15

## 2025-01-15 PROBLEM — R25.2 MUSCLE CRAMPS: Status: RESOLVED | Noted: 2017-10-05 | Resolved: 2025-01-15

## 2025-01-15 LAB
25(OH)D3+25(OH)D2 SERPL-MCNC: 30.7 NG/ML (ref 30–100)
ALBUMIN SERPL-MCNC: 4.6 G/DL (ref 4.1–5.1)
ALP SERPL-CCNC: 61 IU/L (ref 44–121)
ALT SERPL-CCNC: 37 IU/L (ref 0–44)
AST SERPL-CCNC: 20 IU/L (ref 0–40)
BILIRUB SERPL-MCNC: 0.7 MG/DL (ref 0–1.2)
BUN SERPL-MCNC: 15 MG/DL (ref 6–20)
BUN/CREAT SERPL: 15 (ref 9–20)
CALCIUM SERPL-MCNC: 9.6 MG/DL (ref 8.7–10.2)
CHLORIDE SERPL-SCNC: 99 MMOL/L (ref 96–106)
CHOLEST SERPL-MCNC: 204 MG/DL (ref 100–199)
CO2 SERPL-SCNC: 23 MMOL/L (ref 20–29)
CREAT SERPL-MCNC: 1 MG/DL (ref 0.76–1.27)
EGFR: 101 ML/MIN/1.73
GLOBULIN SER-MCNC: 2.9 G/DL (ref 1.5–4.5)
GLUCOSE SERPL-MCNC: 82 MG/DL (ref 70–99)
HBA1C MFR BLD: 4.7 % (ref 4.8–5.6)
HDLC SERPL-MCNC: 35 MG/DL
LDLC SERPL CALC-MCNC: 124 MG/DL (ref 0–99)
POTASSIUM SERPL-SCNC: 4.9 MMOL/L (ref 3.5–5.2)
PROT SERPL-MCNC: 7.5 G/DL (ref 6–8.5)
SL AMB VLDL CHOLESTEROL CALC: 45 MG/DL (ref 5–40)
SODIUM SERPL-SCNC: 135 MMOL/L (ref 134–144)
T4 FREE SERPL-MCNC: 1.07 NG/DL (ref 0.82–1.77)
TRIGL SERPL-MCNC: 251 MG/DL (ref 0–149)
TSH SERPL DL<=0.005 MIU/L-ACNC: 2.09 UIU/ML (ref 0.45–4.5)

## 2025-01-15 PROCEDURE — 99214 OFFICE O/P EST MOD 30 MIN: CPT | Performed by: STUDENT IN AN ORGANIZED HEALTH CARE EDUCATION/TRAINING PROGRAM

## 2025-01-15 RX ORDER — POLYMYXIN B SULFATE AND TRIMETHOPRIM 1; 10000 MG/ML; [USP'U]/ML
SOLUTION OPHTHALMIC
COMMUNITY
Start: 2025-01-13

## 2025-01-15 RX ORDER — TADALAFIL 20 MG/1
TABLET ORAL
Qty: 20 TABLET | Refills: 0 | Status: SHIPPED | OUTPATIENT
Start: 2025-01-15

## 2025-01-15 RX ORDER — TADALAFIL 5 MG/1
5 TABLET ORAL EVERY MORNING
Qty: 90 TABLET | Refills: 0 | Status: SHIPPED | OUTPATIENT
Start: 2025-01-15

## 2025-01-15 NOTE — ASSESSMENT & PLAN NOTE
Mood presently stable on current regimen of Abilify 2 mg daily at bedtime and Wellbutrin 300 mg daily.  Patient is established with a psychiatrist and continues to see them regularly.  Continue current regimen and psychiatry follow-up.

## 2025-01-15 NOTE — ASSESSMENT & PLAN NOTE
Patient with history of obesity.  Reports over 100 pound weight loss with Wegovy.  Currently sees endocrinology for prediabetes and obesity.  Will continue to follow with them.  Encouraged continued dietary and exercise modifications to support weight loss.

## 2025-01-15 NOTE — ASSESSMENT & PLAN NOTE
Patient reports longstanding history of asthma.  States that it has been well-controlled and he will only occasionally get flares of his asthma during the winter months or if he has an upper respiratory infection.  Because of this, he states that he only uses his albuterol inhaler a few times each year.  Continue albuterol as needed.

## 2025-01-15 NOTE — ASSESSMENT & PLAN NOTE
Patient was diagnosed with prediabetes in the past with A1c as high as 5.7%.  He established with endocrinology and is current being treated for prediabetes and for obesity with Wegovy.  Most recent A1c is now 4.7%.

## 2025-01-15 NOTE — ASSESSMENT & PLAN NOTE
Patient with history of vitamin D deficiency.  He is currently taking vitamin D supplements.  Most recent vitamin D now within normal range.  Continue supplementation.

## 2025-01-15 NOTE — ASSESSMENT & PLAN NOTE
Patient has a history of hyperlipidemia including hypertriglyceridemia.  He states that he was on a statin in the past and had myalgias and that he also took fenofibrate in the past although this gave him side effects.  He is not currently taking any cholesterol-lowering medications.  Reviewed most recent lipid panel with patient with total cholesterol 204, triglycerides of 251, LDL at 124.  Patient also admits that his diet has not been ideal.   ASCVD risk for patient is 1.8%.  Low risk for atherosclerotic disease at this time.  Advised dietary and exercise modifications to help improve lipids.  This also seems to been improved with his weight loss.  Will continue to monitor lipid profile and ASCVD risk.  Will continue without cholesterol-lowering medication at this time.

## 2025-01-15 NOTE — ASSESSMENT & PLAN NOTE
Patient had recent admission to hospital on 10/17/2024 for laparoscopic inguinal hernia repair.  States that there were no complications and that he has surgical wounds have healed well with no ongoing issues.

## 2025-01-15 NOTE — ASSESSMENT & PLAN NOTE
Patient has history of HIV disease, has never been diagnosed with AIDS.  Sees Dr. Decker with infectious disease regularly.  Patient does well with Biktarvy and has had undetectable viral loads and CD4 counts greater than the thousand in the past.  Continue antiretroviral therapy and infectious disease follow-up.

## 2025-01-15 NOTE — ASSESSMENT & PLAN NOTE
Patient states he has longstanding history of coccydynia after sustaining injury from falling off a horse many years ago.  He will occasionally get flares and uses a he will occasionally get flares and uses a cushioned pillow while seated.    He is able to manage his flares with over-the-counter NSAIDs.

## 2025-01-16 ENCOUNTER — OFFICE VISIT (OUTPATIENT)
Dept: ENDOCRINOLOGY | Facility: CLINIC | Age: 35
End: 2025-01-16
Payer: COMMERCIAL

## 2025-01-16 VITALS
SYSTOLIC BLOOD PRESSURE: 130 MMHG | DIASTOLIC BLOOD PRESSURE: 90 MMHG | OXYGEN SATURATION: 98 % | BODY MASS INDEX: 34.36 KG/M2 | WEIGHT: 240 LBS | HEART RATE: 93 BPM | HEIGHT: 70 IN

## 2025-01-16 DIAGNOSIS — E66.09 CLASS 1 OBESITY DUE TO EXCESS CALORIES WITH SERIOUS COMORBIDITY AND BODY MASS INDEX (BMI) OF 34.0 TO 34.9 IN ADULT: ICD-10-CM

## 2025-01-16 DIAGNOSIS — R73.03 PREDIABETES: Primary | ICD-10-CM

## 2025-01-16 DIAGNOSIS — E78.5 HYPERLIPIDEMIA, UNSPECIFIED HYPERLIPIDEMIA TYPE: ICD-10-CM

## 2025-01-16 DIAGNOSIS — E55.9 VITAMIN D DEFICIENCY: ICD-10-CM

## 2025-01-16 DIAGNOSIS — E66.811 CLASS 1 OBESITY DUE TO EXCESS CALORIES WITH SERIOUS COMORBIDITY AND BODY MASS INDEX (BMI) OF 34.0 TO 34.9 IN ADULT: ICD-10-CM

## 2025-01-16 PROCEDURE — 99214 OFFICE O/P EST MOD 30 MIN: CPT | Performed by: INTERNAL MEDICINE

## 2025-01-16 NOTE — ASSESSMENT & PLAN NOTE
Has had a good response to Wegovy however weight loss has stalled-WILL SWITCH TO 10 MG ZEPBOUND and discontinue Wegovy-he will send me a message when he is ready to make the switch

## 2025-01-16 NOTE — PROGRESS NOTES
Zack Harper 34 y.o. male MRN: [id number]    Encounter: [id number]      Assessment & Plan     Problem List Items Addressed This Visit          Other    Class 1 obesity due to excess calories with serious comorbidity and body mass index (BMI) of 34.0 to 34.9 in adult    Has had a good response to Wegovy however weight loss has stalled-WILL SWITCH TO 10 MG ZEPBOUND and discontinue Wegovy-he will send me a message when he is ready to make the switch         Hyperlipidemia    Relevant Orders    Lipid Panel with Direct LDL reflex    CBC and differential    Prediabetes - Primary    Improved-focus on dietary and lifestyle modifications and weight loss         Relevant Orders    Comprehensive metabolic panel    Hemoglobin A1C    CBC and differential    Iron Panel (Includes Ferritin, Iron Sat%, Iron, and TIBC)    Vitamin D deficiency    Advised to take vitamin D3 5000 international units daily consistently         Relevant Orders    Vitamin D 25 hydroxy        CC: pre Diabetes and obesity    History of Present Illness     HPI:  34 y.o. male with a history of Prediabetes, dyslipidemia, Vitamin D Deficiency, and Obesity.  Seen in follow-up    Underwent hernia repair in October  - was off Wegovy for 3 weeks -less physical activity after the surgery and has had 10 lbs weight gain since the last visit    No GI SE       Review of Systems    Historical Information   Past Medical History:   Diagnosis Date    Abnormal liver function tests     ADD (attention deficit disorder)     Allergic     Asthma     Benign essential hypertension     Depression     Encounter for screening examination for sexually transmitted disease     GERD (gastroesophageal reflux disease)     HIV (human immunodeficiency virus infection) (HCC)     Hypercholesteremia     Hypertension     Microhematuria     Morbid obesity (HCC)     Obesity     Seasonal allergies     Sleep apnea     resolved with sx     Past Surgical History:   Procedure Laterality Date    [place]  LAPAROSCOPY SURG RPR INITIAL INGUINAL HERNIA Bilateral 10/17/2024    Procedure: LAPAROSCOPIC INGUINAL HERNIA REPAIR;  Surgeon: Kristian Murcia MD;  Location: [facility];  Service: General    TONSILLECTOMY       Social History   Social History     Substance and Sexual Activity   Alcohol Use Yes    Comment: 1 drink per month     Social History     Substance and Sexual Activity   Drug Use Yes    Frequency: 7.0 times per week    Types: Marijuana    Comment: medical-     Social History     Tobacco Use   Smoking Status Former    Current packs/day: 0.00    Average packs/day: 0.5 packs/day for 10.0 years (5.0 ttl pk-yrs)    Types: Cigarettes    Start date: 2009    Quit date: 2019    Years since quittin.3   Smokeless Tobacco Never     Family History:   Family History   Problem Relation Age of Onset    Hyperlipidemia Mother     Mental illness Mother     Depression Mother     Asthma Mother     Arthritis Mother     Cancer Mother         skin    Psychiatric Illness Mother     Diabetes Father     Hyperlipidemia Father     Hypertension Father     Heart disease Father     Diabetes type II Father     Alcohol abuse Father     Mental illness Father     Depression Father     Bipolar disorder Father     Diabetes Paternal Aunt     Diabetes Paternal Uncle     Cancer Maternal Grandfather         prostate    Prostate cancer Maternal Grandfather     Diabetes Paternal Grandmother     Heart disease Paternal Grandmother     ADD / ADHD Brother        Meds/Allergies   Current Outpatient Medications   Medication Sig Dispense Refill    albuterol (PROVENTIL HFA,VENTOLIN HFA) 90 mcg/act inhaler Inhale 1-2 puffs every 6 (six) hours as needed for wheezing 6.7 g 3    Amphet-Dextroamphet 3-Bead ER (Mydayis) 37.5 MG CP24 Take by mouth      ARIPiprazole (ABILIFY) 2 mg tablet daily at bedtime      bictegravir-emtricitab-tenofovir alafenamide (Biktarvy) -25 MG tablet Take 1 tablet by mouth daily with breakfast 90 tablet 0    buPROPion  "(WELLBUTRIN XL) 300 mg 24 hr tablet Take 300 mg by mouth daily      Cholecalciferol (Vitamin D3) 125 MCG (5000 UT) CAPS 1 cap daily 90 capsule 3    polymyxin b-trimethoprim (POLYTRIM) ophthalmic solution       Semaglutide-Weight Management (WEGOVY) 2.4 MG/0.75ML 2.4 mg weekly 9 mL 6    tadalafil (CIALIS) 20 MG tablet Take 1 tablet by mouth daily as needed for erectile dysfunction. 20 tablet 0    tadalafil (CIALIS) 5 MG tablet Take 1 tablet (5 mg total) by mouth every morning 90 tablet 0     No current facility-administered medications for this visit.     No Known Allergies    Objective   Vitals: Blood pressure 130/90, pulse 93, height 5' 10\" (1.778 m), weight 109 kg (240 lb), SpO2 98%.    Physical Exam  Vitals reviewed.   Constitutional:       General: He is not in acute distress.     Appearance: Normal appearance. He is obese. He is not ill-appearing, toxic-appearing or diaphoretic.   HENT:      Head: Normocephalic and atraumatic.   Eyes:      General: No scleral icterus.     Extraocular Movements: Extraocular movements intact.   Cardiovascular:      Rate and Rhythm: Normal rate and regular rhythm.      Heart sounds: Normal heart sounds. No murmur heard.  Pulmonary:      Effort: Pulmonary effort is normal. No respiratory distress.      Breath sounds: Normal breath sounds. No wheezing or rales.   Musculoskeletal:      Cervical back: Neck supple.      Right lower leg: No edema.      Left lower leg: No edema.   Lymphadenopathy:      Cervical: No cervical adenopathy.   Skin:     General: Skin is warm and dry.   Neurological:      General: No focal deficit present.      Mental Status: He is alert and oriented to person, place, and time.      Gait: Gait normal.   Psychiatric:         Mood and Affect: Mood normal.         Behavior: Behavior normal.         Thought Content: Thought content normal.         Judgment: Judgment normal.         The history was obtained from the review of the chart, patient.    Lab Results: " "  Lab Results   Component Value Date/Time    Hemoglobin A1C 4.7 (L) 01/14/2025 10:14 AM    White Blood Cell Count 7.7 06/21/2024 09:48 AM    Hemoglobin 13.4 06/21/2024 09:48 AM    HCT 42.5 06/21/2024 09:48 AM    MCV 86 06/21/2024 09:48 AM    Platelet Count 404 06/21/2024 09:48 AM    BUN 15 01/14/2025 10:14 AM    BUN 20 06/21/2024 09:48 AM    Potassium 4.9 01/14/2025 10:14 AM    Potassium 4.7 06/21/2024 09:48 AM    Chloride 99 01/14/2025 10:14 AM    Chloride 101 06/21/2024 09:48 AM    CO2 23 01/14/2025 10:14 AM    CO2 23 06/21/2024 09:48 AM    Creatinine 1.00 01/14/2025 10:14 AM    Creatinine 0.89 06/21/2024 09:48 AM    AST 20 01/14/2025 10:14 AM    AST 18 06/21/2024 09:48 AM    ALT 37 01/14/2025 10:14 AM    ALT 28 06/21/2024 09:48 AM    Protein, Total 7.5 01/14/2025 10:14 AM    Protein, Total 7.9 06/21/2024 09:48 AM    Albumin 4.6 01/14/2025 10:14 AM    Albumin 4.5 06/21/2024 09:48 AM    Globulin, Total 2.9 01/14/2025 10:14 AM    Globulin, Total 3.4 06/21/2024 09:48 AM    HDL 35 (L) 01/14/2025 10:14 AM    Triglycerides 251 (H) 01/14/2025 10:14 AM           Imaging Studies:     Portions of the record may have been created with voice recognition software. Occasional wrong word or \"sound a like\" substitutions may have occurred due to the inherent limitations of voice recognition software. Read the chart carefully and recognize, using context, where substitutions have occurred.    "

## 2025-01-18 LAB
ALBUMIN SERPL-MCNC: 4.6 G/DL (ref 4.1–5.1)
ALP SERPL-CCNC: 61 IU/L (ref 44–121)
ALT SERPL-CCNC: 37 IU/L (ref 0–44)
APPEARANCE UR: CLEAR
AST SERPL-CCNC: 20 IU/L (ref 0–40)
BACTERIA URNS QL MICRO: NORMAL
BASOPHILS # BLD AUTO: 0 X10E3/UL (ref 0–0.2)
BASOPHILS NFR BLD AUTO: 0 %
BILIRUB SERPL-MCNC: 0.7 MG/DL (ref 0–1.2)
BILIRUB UR QL STRIP: NEGATIVE
BUN SERPL-MCNC: 15 MG/DL (ref 6–20)
BUN/CREAT SERPL: 16 (ref 9–20)
CALCIUM SERPL-MCNC: 9.7 MG/DL (ref 8.7–10.2)
CASTS URNS QL MICRO: NORMAL /LPF
CD3+CD4+ CELLS # BLD: 1045 /UL (ref 359–1519)
CD3+CD4+ CELLS NFR BLD: 41.8 % (ref 30.8–58.5)
CHLORIDE SERPL-SCNC: 100 MMOL/L (ref 96–106)
CO2 SERPL-SCNC: 22 MMOL/L (ref 20–29)
COLOR UR: YELLOW
CREAT SERPL-MCNC: 0.95 MG/DL (ref 0.76–1.27)
EGFR: 108 ML/MIN/1.73
EOSINOPHIL # BLD AUTO: 0.1 X10E3/UL (ref 0–0.4)
EOSINOPHIL NFR BLD AUTO: 1 %
EPI CELLS #/AREA URNS HPF: NORMAL /HPF (ref 0–10)
ERYTHROCYTE [DISTWIDTH] IN BLOOD BY AUTOMATED COUNT: 13.1 % (ref 11.6–15.4)
GAMMA INTERFERON BACKGROUND BLD IA-ACNC: 0.06 IU/ML
GLOBULIN SER-MCNC: 2.9 G/DL (ref 1.5–4.5)
GLUCOSE SERPL-MCNC: 83 MG/DL (ref 70–99)
GLUCOSE UR QL: NEGATIVE
HCT VFR BLD AUTO: 48.3 % (ref 37.5–51)
HGB BLD-MCNC: 16.1 G/DL (ref 13–17.7)
HGB UR QL STRIP: NEGATIVE
HIV SUSC PNL ISLT PHENOTYP: NORMAL
HIV1 RNA # SERPL NAA+PROBE: 40 COPIES/ML
HIV1 RNA SERPL NAA+PROBE-LOG#: 1.6 LOG10COPY/ML
IMM GRANULOCYTES # BLD: 0 X10E3/UL (ref 0–0.1)
IMM GRANULOCYTES NFR BLD: 0 %
KETONES UR QL STRIP: NEGATIVE
LEUKOCYTE ESTERASE UR QL STRIP: ABNORMAL
LYMPHOCYTES # BLD AUTO: 2.5 X10E3/UL (ref 0.7–3.1)
LYMPHOCYTES NFR BLD AUTO: 35 %
M TB IFN-G CD4+ T-CELLS BLD-ACNC: 0.07 IU/ML
M TB IFN-G CD4+ T-CELLS BLD-ACNC: 0.07 IU/ML
MCH RBC QN AUTO: 30.5 PG (ref 26.6–33)
MCHC RBC AUTO-ENTMCNC: 33.3 G/DL (ref 31.5–35.7)
MCV RBC AUTO: 92 FL (ref 79–97)
MICRO URNS: ABNORMAL
MITOGEN IGNF BLD-ACNC: >10 IU/ML
MONOCYTES # BLD AUTO: 0.4 X10E3/UL (ref 0.1–0.9)
MONOCYTES NFR BLD AUTO: 5 %
NEUTROPHILS # BLD AUTO: 4.2 X10E3/UL (ref 1.4–7)
NEUTROPHILS NFR BLD AUTO: 59 %
NITRITE UR QL STRIP: NEGATIVE
PH UR STRIP: 6.5 [PH] (ref 5–7.5)
PLATELET # BLD AUTO: 314 X10E3/UL (ref 150–450)
POTASSIUM SERPL-SCNC: 4.8 MMOL/L (ref 3.5–5.2)
PROT SERPL-MCNC: 7.5 G/DL (ref 6–8.5)
PROT UR QL STRIP: NEGATIVE
QUANTIFERON INCUBATION COMMENT: NORMAL
QUANTIFERON-TB GOLD PLUS: NEGATIVE
RBC # BLD AUTO: 5.28 X10E6/UL (ref 4.14–5.8)
RBC #/AREA URNS HPF: NORMAL /HPF (ref 0–2)
SERVICE CMNT-IMP: NORMAL
SODIUM SERPL-SCNC: 138 MMOL/L (ref 134–144)
SP GR UR: 1.02 (ref 1–1.03)
UROBILINOGEN UR STRIP-ACNC: 0.2 MG/DL (ref 0.2–1)
WBC # BLD AUTO: 7.3 X10E3/UL (ref 3.4–10.8)
WBC #/AREA URNS HPF: NORMAL /HPF (ref 0–5)

## 2025-01-21 NOTE — ASSESSMENT & PLAN NOTE
Remains on Abilify and Wellbutrin.  Follow-up with mental health provider.  Orders:    CBC and differential; Future    Comprehensive metabolic panel; Future    HIV-1 RNA, quantitative, PCR; Future    T-helper cells (CD4) count; Future

## 2025-01-21 NOTE — ASSESSMENT & PLAN NOTE
Continuing Wegovy and lifestyle modification with weight loss noted.  He did get some weight since the last visit we will start to push exercise and diet further.  He is also changing to Zepbound soon.  Orders:    CBC and differential; Future    Comprehensive metabolic panel; Future    HIV-1 RNA, quantitative, PCR; Future    T-helper cells (CD4) count; Future

## 2025-01-21 NOTE — PROGRESS NOTES
Name: Zack Harper      : 1990      MRN: [id number]  Encounter Provider: Drew Decker MD  Encounter Date: 2025   Encounter department: [facility]  :  Assessment & Plan  HIV disease (HCC)  Doing well on Biktarvy with a near undetectable viral load and a CD4 count of greater than 1000.Continue ART, recheck CBC with differential, CMP, HIV RNA, and CD4 in 5 months to make sure no developing toxicity or treatment failure and follow-up in 6 months.  Stressed adherence.  Stressed that the patient should not take any of his vitamins or supplements within 4 hours of the Biktarvy and he will address.  Will give influenza vaccine today  Orders:    CBC and differential; Future    Comprehensive metabolic panel; Future    HIV-1 RNA, quantitative, PCR; Future    T-helper cells (CD4) count; Future    bictegravir-emtricitab-tenofovir alafenamide (Biktarvy) -25 MG tablet; Take 1 tablet by mouth daily with breakfast    Flublok (recombinant) 0.5 mL IM    Class 1 obesity due to excess calories with serious comorbidity and body mass index (BMI) of 34.0 to 34.9 in adult  Continuing Wegovy and lifestyle modification with weight loss noted.  He did get some weight since the last visit we will start to push exercise and diet further.  He is also changing to Zepbound soon.  Orders:    CBC and differential; Future    Comprehensive metabolic panel; Future    HIV-1 RNA, quantitative, PCR; Future    T-helper cells (CD4) count; Future    Bipolar 2 disorder (HCC)  Remains on Abilify and Wellbutrin.  Follow-up with mental health provider.  Orders:    CBC and differential; Future    Comprehensive metabolic panel; Future    HIV-1 RNA, quantitative, PCR; Future    T-helper cells (CD4) count; Future        Patient was provided medication, adherence and prevention education.    History of Present Illness   Routine follow-up for HIV.  Patient claims 100% adherence with Biktarvy. Patient denies any  notable side effects.  Overall the feeling well.  The patient denies any fever chills or sweats, denies any nausea vomiting or diarrhea, denies any cough or shortness of breath.    ROS: A complete review of systems are negative other than that noted in the HPI        Objective   /75   Pulse 101   Temp 97.7 °F (36.5 °C)   Resp 17   Wt 111 kg (245 lb)   SpO2 97%   BMI 35.15 kg/m²     General: Alert, interactive, appearing well, nontoxic, no acute distress.  Neck: Supple without lymphadenopathy, no thyromegaly or masses  Throat: Oropharynx moist without lesions.   Lungs: Clear to auscultation bilaterally; no wheezes, rhonchi or rales; respirations unlabored  Heart: RRR; no murmur, rub or gallop  Abdomen: Soft, non-tender, non-distended, positive bowel sounds.    Extremities: No clubbing, cyanosis or edema  Skin: No new rashes or lesions. No draining wounds noted.    Lab Results: I have personally reviewed pertinent labs.    HIV RNA 40 copies, CD4 greater than 1000, hemoglobin 16.1, creatinine 0.95, liver function test normal, white blood cell count 7.3, platelet count 354, QuantiFERON gold negative

## 2025-01-21 NOTE — ASSESSMENT & PLAN NOTE
Doing well on Biktarvy with a near undetectable viral load and a CD4 count of greater than 1000.Continue ART, recheck CBC with differential, CMP, HIV RNA, and CD4 in 5 months to make sure no developing toxicity or treatment failure and follow-up in 6 months.  Stressed adherence.  Stressed that the patient should not take any of his vitamins or supplements within 4 hours of the Biktarvy and he will address.  Will give influenza vaccine today  Orders:    CBC and differential; Future    Comprehensive metabolic panel; Future    HIV-1 RNA, quantitative, PCR; Future    T-helper cells (CD4) count; Future    bictegravir-emtricitab-tenofovir alafenamide (Biktarvy) -25 MG tablet; Take 1 tablet by mouth daily with breakfast    Flublok (recombinant) 0.5 mL IM

## 2025-01-24 ENCOUNTER — OFFICE VISIT (OUTPATIENT)
Dept: INFECTIOUS DISEASES | Facility: CLINIC | Age: 35
End: 2025-01-24
Payer: COMMERCIAL

## 2025-01-24 VITALS
DIASTOLIC BLOOD PRESSURE: 75 MMHG | WEIGHT: 245 LBS | BODY MASS INDEX: 35.15 KG/M2 | RESPIRATION RATE: 17 BRPM | OXYGEN SATURATION: 97 % | HEART RATE: 101 BPM | TEMPERATURE: 97.7 F | SYSTOLIC BLOOD PRESSURE: 135 MMHG

## 2025-01-24 DIAGNOSIS — F31.81 BIPOLAR 2 DISORDER (HCC): ICD-10-CM

## 2025-01-24 DIAGNOSIS — E66.09 CLASS 1 OBESITY DUE TO EXCESS CALORIES WITH SERIOUS COMORBIDITY AND BODY MASS INDEX (BMI) OF 34.0 TO 34.9 IN ADULT: ICD-10-CM

## 2025-01-24 DIAGNOSIS — E66.811 CLASS 1 OBESITY DUE TO EXCESS CALORIES WITH SERIOUS COMORBIDITY AND BODY MASS INDEX (BMI) OF 34.0 TO 34.9 IN ADULT: ICD-10-CM

## 2025-01-24 DIAGNOSIS — B20 HIV DISEASE (HCC): Primary | ICD-10-CM

## 2025-01-24 PROCEDURE — 99214 OFFICE O/P EST MOD 30 MIN: CPT | Performed by: INTERNAL MEDICINE

## 2025-01-24 PROCEDURE — 90673 RIV3 VACCINE NO PRESERV IM: CPT

## 2025-01-24 PROCEDURE — 90471 IMMUNIZATION ADMIN: CPT

## 2025-01-24 RX ORDER — BICTEGRAVIR SODIUM, EMTRICITABINE, AND TENOFOVIR ALAFENAMIDE FUMARATE 50; 200; 25 MG/1; MG/1; MG/1
1 TABLET ORAL
Qty: 90 TABLET | Refills: 1 | Status: SHIPPED | OUTPATIENT
Start: 2025-01-24

## 2025-01-27 ENCOUNTER — TELEPHONE (OUTPATIENT)
Age: 35
End: 2025-01-27

## 2025-01-27 DIAGNOSIS — E66.09 CLASS 1 OBESITY DUE TO EXCESS CALORIES WITH SERIOUS COMORBIDITY AND BODY MASS INDEX (BMI) OF 34.0 TO 34.9 IN ADULT: Primary | ICD-10-CM

## 2025-01-27 DIAGNOSIS — E66.811 CLASS 1 OBESITY DUE TO EXCESS CALORIES WITH SERIOUS COMORBIDITY AND BODY MASS INDEX (BMI) OF 34.0 TO 34.9 IN ADULT: Primary | ICD-10-CM

## 2025-01-27 NOTE — TELEPHONE ENCOUNTER
Patient sent [employer] message stating Zepbound 10 mg requires a prior authorization. Patient attached prescription benefit card in [employer] message.  Please advise

## 2025-01-28 NOTE — TELEPHONE ENCOUNTER
PA for Zepbound) 10 mg/0.5 mL SUBMITTED to [employer]    via    [x]CMM-KEY: [id number]  [x]PA sent as URGENT    All office notes, labs and other pertaining documents and studies sent. Clinical questions answered. Awaiting determination from insurance company.     Turnaround time for your insurance to make a decision on your Prior Authorization can take 7-21 business days.

## 2025-02-07 NOTE — TELEPHONE ENCOUNTER
PA for Zepbound) 10 mg/0.5 mL  APPROVED     Date(s) approved 12/29/24-9/25/25    Case #[id number]    Patient advised by          []MyChart Message  []Phone call   []LMOM  []L/M to call office as no active Communication consent on file  []Unable to leave detailed message as VM not approved on Communication consent       Pharmacy advised by    [x]Fax  []Phone call    Approval letter scanned into Media No

## 2025-02-25 DIAGNOSIS — E66.811 CLASS 1 OBESITY DUE TO EXCESS CALORIES WITH SERIOUS COMORBIDITY AND BODY MASS INDEX (BMI) OF 34.0 TO 34.9 IN ADULT: Primary | ICD-10-CM

## 2025-02-25 DIAGNOSIS — E66.09 CLASS 1 OBESITY DUE TO EXCESS CALORIES WITH SERIOUS COMORBIDITY AND BODY MASS INDEX (BMI) OF 34.0 TO 34.9 IN ADULT: Primary | ICD-10-CM

## 2025-02-25 RX ORDER — TIRZEPATIDE 12.5 MG/.5ML
12.5 INJECTION, SOLUTION SUBCUTANEOUS WEEKLY
Qty: 2 ML | Refills: 1 | Status: SHIPPED | OUTPATIENT
Start: 2025-02-25 | End: 2025-04-22

## 2025-03-24 DIAGNOSIS — E66.09 CLASS 1 OBESITY DUE TO EXCESS CALORIES WITH SERIOUS COMORBIDITY AND BODY MASS INDEX (BMI) OF 34.0 TO 34.9 IN ADULT: Primary | ICD-10-CM

## 2025-03-24 DIAGNOSIS — E66.811 CLASS 1 OBESITY DUE TO EXCESS CALORIES WITH SERIOUS COMORBIDITY AND BODY MASS INDEX (BMI) OF 34.0 TO 34.9 IN ADULT: Primary | ICD-10-CM

## 2025-03-24 RX ORDER — TIRZEPATIDE 15 MG/.5ML
15 INJECTION, SOLUTION SUBCUTANEOUS WEEKLY
Qty: 2 ML | Refills: 3 | Status: SHIPPED | OUTPATIENT
Start: 2025-03-24 | End: 2025-07-14

## 2025-04-01 NOTE — PROGRESS NOTES
[facility]- [place]  OFFICE VISIT     PATIENT INFORMATION     Zack Harper   35 y.o. male   MRN: [id number]    ASSESSMENT/PLAN     Assessment & Plan  Acute non-recurrent frontal sinusitis  Patient presents today for an acute same-day sick visit concern of possible sinus infection.  Over the past 2 days she has been experiencing rhinorrhea, nose congestion, postnasal drip, cough, chest congestion with mucus.  Patient has history of sinus infections states he typically will get a sinus infection with the change in season.  Denies any fevers, chills, trouble breathing, chest pains.  Has not taking any over-the-counter medications for symptoms.    Physical exam shows lung clear to auscultation.  Nares and nasal turbinates inflamed with rhinorrhea.  Posterior oropharynx erythematous.  Patient likely has acute sinusitis.  Explained to patient that at this point the progression of the disease it still would likely be a viral infection.  I encouraged patient to continue supportive care over the next few days.  If patient does not see improvement in his symptoms over the next few days he may then initiate course of Augmentin for bacterial sinus infection.  Orders:    amoxicillin-clavulanate (AUGMENTIN) 875-125 mg per tablet; Take 1 tablet by mouth every 12 (twelve) hours for 7 days         HEALTH MAINTENANCE     Immunization History   Administered Date(s) Administered    COVID-19 PFIZER VACCINE 0.3 ML IM 02/24/2021, 03/19/2021, 09/30/2021    COVID-19 Pfizer Vac BIVALENT Anton-sucrose 12 Yr+ IM 01/07/2023, 11/10/2023    DT (pediatric) 06/15/2004    DTP 1990, 1990, 1990, 09/30/1991, 03/14/1995    Hep B, Adolescent or Pediatric 08/04/1992, 09/17/1992, 03/24/1993    HiB 07/01/1991    INFLUENZA 10/28/2013, 11/19/2020, 12/15/2022    Influenza Recombinant Preservative Free Im 01/24/2025    Influenza, high dose seasonal 0.7 mL 12/07/2023    Influenza, injectable, quadrivalent, preservative  free 0.5 mL 01/12/2022    Influenza, recombinant, quadrivalent,injectable, preservative free 11/14/2019, 11/20/2020, 12/15/2022    MMR 07/01/1991, 03/08/1994    Meningococcal MCV4P 08/22/2006, 08/15/2019    OPV 1990, 1990, 09/30/1991, 03/14/1995    Pneumococcal Conjugate 13-Valent 02/13/2020, 06/11/2020    Pneumococcal Polysaccharide PPV23 10/11/2012    Smallpox Monkeypox Vaccine, Live Attenuated, Preservative-Free 08/16/2022    Tdap 07/21/2020, 07/22/2020    Tuberculin Skin Test-PPD Intradermal 03/13/1991         CHIEF COMPLAINT     Chief Complaint   Patient presents with    Cold Like Symptoms     04/01  SYMP : headache , congestion , wheezing ,fatigue       HISTORY OF PRESENT ILLNESS     Mr. Zack Harper is a 35-year-old male with past medical history of asthma, bipolar 2 disorder, ADHD, HIV disease, vitamin D deficiency, hyperlipidemia, obesity.  Patient presents to clinic today for an acute visit with concern of possible upper respiratory or sinus infection.    REVIEW OF SYSTEMS     Review of Systems   Constitutional:  Negative for chills and fever.   HENT:  Positive for congestion, postnasal drip, rhinorrhea and sinus pressure.    Respiratory:  Positive for cough. Negative for shortness of breath and wheezing.    Cardiovascular:  Negative for chest pain and leg swelling.   Gastrointestinal:  Negative for abdominal pain, constipation, diarrhea, nausea and vomiting.   Genitourinary:  Negative for difficulty urinating and dysuria.   Musculoskeletal:  Negative for arthralgias and back pain.   Skin:  Negative for rash and wound.   Neurological:  Negative for dizziness, weakness and headaches.   Psychiatric/Behavioral:  Negative for agitation, behavioral problems and confusion.      OBJECTIVE     Vitals:    04/02/25 1043   Pulse: 80   Resp: 16   Temp: 98.4 °F (36.9 °C)   SpO2: 96%     Physical Exam  Constitutional:       Appearance: Normal appearance.   HENT:      Right Ear: External ear normal.       Left Ear: External ear normal.   Eyes:      Extraocular Movements: Extraocular movements intact.      Conjunctiva/sclera: Conjunctivae normal.      Pupils: Pupils are equal, round, and reactive to light.   Cardiovascular:      Rate and Rhythm: Normal rate and regular rhythm.      Pulses: Normal pulses.      Heart sounds: Normal heart sounds. No murmur heard.  Pulmonary:      Effort: Pulmonary effort is normal. No respiratory distress.      Breath sounds: Normal breath sounds. No wheezing, rhonchi or rales.   Abdominal:      General: Abdomen is flat. Bowel sounds are normal. There is no distension.      Palpations: Abdomen is soft.      Tenderness: There is no abdominal tenderness.   Musculoskeletal:      Right lower leg: No edema.      Left lower leg: No edema.   Skin:     General: Skin is warm and dry.   Neurological:      Mental Status: He is alert and oriented to person, place, and time.   Psychiatric:         Mood and Affect: Mood normal.         Behavior: Behavior normal.         Thought Content: Thought content normal.       CURRENT MEDICATIONS     Current Outpatient Medications:     amoxicillin-clavulanate (AUGMENTIN) 875-125 mg per tablet, Take 1 tablet by mouth every 12 (twelve) hours for 7 days, Disp: 14 tablet, Rfl: 0    albuterol (PROVENTIL HFA,VENTOLIN HFA) 90 mcg/act inhaler, Inhale 1-2 puffs every 6 (six) hours as needed for wheezing, Disp: 6.7 g, Rfl: 3    Amphet-Dextroamphet 3-Bead ER (Mydayis) 37.5 MG CP24, Take by mouth, Disp: , Rfl:     ARIPiprazole (ABILIFY) 2 mg tablet, daily at bedtime, Disp: , Rfl:     bictegravir-emtricitab-tenofovir alafenamide (Biktarvy) -25 MG tablet, Take 1 tablet by mouth daily with breakfast, Disp: 90 tablet, Rfl: 1    buPROPion (WELLBUTRIN XL) 300 mg 24 hr tablet, Take 300 mg by mouth daily, Disp: , Rfl:     Cholecalciferol (Vitamin D3) 125 MCG (5000 UT) CAPS, 1 cap daily, Disp: 90 capsule, Rfl: 3    polymyxin b-trimethoprim (POLYTRIM) ophthalmic solution, ,  Disp: , Rfl:     tadalafil (CIALIS) 20 MG tablet, Take 1 tablet by mouth daily as needed for erectile dysfunction., Disp: 20 tablet, Rfl: 0    tadalafil (CIALIS) 5 MG tablet, Take 1 tablet (5 mg total) by mouth every morning, Disp: 90 tablet, Rfl: 0    tirzepatide (Zepbound) 15 mg/0.5 mL auto-injector, Inject 0.5 mL (15 mg total) under the skin once a week, Disp: 2 mL, Rfl: 3    PAST MEDICAL & SURGICAL HISTORY     Past Medical History:   Diagnosis Date    Abnormal liver function tests     ADD (attention deficit disorder)     Allergic     Asthma     Benign essential hypertension     Depression     Encounter for screening examination for sexually transmitted disease     GERD (gastroesophageal reflux disease)     HIV (human immunodeficiency virus infection) (HCC)     Hypercholesteremia     Hypertension     Microhematuria     Morbid obesity (HCC)     Obesity     Seasonal allergies     Sleep apnea     resolved with sx     Past Surgical History:   Procedure Laterality Date    NM LAPAROSCOPY SURG RPR INITIAL INGUINAL HERNIA Bilateral 10/17/2024    Procedure: LAPAROSCOPIC INGUINAL HERNIA REPAIR;  Surgeon: Kristian Murcia MD;  Location: [facility];  Service: General    TONSILLECTOMY       SOCIAL & FAMILY HISTORY     Social History     Socioeconomic History    Marital status: /Civil Union     Spouse name: Not on file    Number of children: Not on file    Years of education: Not on file    Highest education level: Not on file   Occupational History    Not on file   Tobacco Use    Smoking status: Former     Current packs/day: 0.00     Average packs/day: 0.5 packs/day for 10.0 years (5.0 ttl pk-yrs)     Types: Cigarettes     Start date: 2009     Quit date: 2019     Years since quittin.5    Smokeless tobacco: Never   Vaping Use    Vaping status: Some Days    Substances: Nicotine, Flavoring   Substance and Sexual Activity    Alcohol use: Yes     Comment: 1 drink per month    Drug use: Yes     Frequency: 7.0  times per week     Types: Marijuana     Comment: medical-    Sexual activity: Yes     Partners: Male     Birth control/protection: None   Other Topics Concern    Not on file   Social History Narrative    Not on file     Social Drivers of Health     Financial Resource Strain: Not on file   Food Insecurity: Not on file   Transportation Needs: Not on file   Physical Activity: Not on file   Stress: Not on file   Social Connections: Not on file   Intimate Partner Violence: Not on file   Housing Stability: Not on file     Social History     Substance and Sexual Activity   Alcohol Use Yes    Comment: 1 drink per month       Social History     Substance and Sexual Activity   Drug Use Yes    Frequency: 7.0 times per week    Types: Marijuana    Comment: medical-     Social History     Tobacco Use   Smoking Status Former    Current packs/day: 0.00    Average packs/day: 0.5 packs/day for 10.0 years (5.0 ttl pk-yrs)    Types: Cigarettes    Start date: 2009    Quit date: 2019    Years since quittin.5   Smokeless Tobacco Never     Family History   Problem Relation Age of Onset    Hyperlipidemia Mother     Mental illness Mother     Depression Mother     Asthma Mother     Arthritis Mother     Cancer Mother         skin    Psychiatric Illness Mother     Diabetes Father     Hyperlipidemia Father     Hypertension Father     Heart disease Father     Diabetes type II Father     Alcohol abuse Father     Mental illness Father     Depression Father     Bipolar disorder Father     Diabetes Paternal Aunt     Diabetes Paternal Uncle     Cancer Maternal Grandfather         prostate    Prostate cancer Maternal Grandfather     Diabetes Paternal Grandmother     Heart disease Paternal Grandmother     ADD / ADHD Brother      ==  Glenn Chowdary DO  [facility]  [address] [place] [address]  Office: [phone]  Fax: [phone]   [phone]  Office: [phone]  Fax: [phone]

## 2025-04-02 ENCOUNTER — OFFICE VISIT (OUTPATIENT)
Age: 35
End: 2025-04-02
Payer: COMMERCIAL

## 2025-04-02 VITALS — OXYGEN SATURATION: 96 % | TEMPERATURE: 98.4 F | RESPIRATION RATE: 16 BRPM | HEART RATE: 80 BPM

## 2025-04-02 DIAGNOSIS — J01.10 ACUTE NON-RECURRENT FRONTAL SINUSITIS: Primary | ICD-10-CM

## 2025-04-02 PROCEDURE — 99213 OFFICE O/P EST LOW 20 MIN: CPT | Performed by: STUDENT IN AN ORGANIZED HEALTH CARE EDUCATION/TRAINING PROGRAM

## 2025-04-07 DIAGNOSIS — J45.20 MILD INTERMITTENT ASTHMA WITHOUT COMPLICATION: ICD-10-CM

## 2025-04-07 DIAGNOSIS — N52.9 ERECTILE DYSFUNCTION, UNSPECIFIED ERECTILE DYSFUNCTION TYPE: ICD-10-CM

## 2025-04-07 NOTE — TELEPHONE ENCOUNTER
Medication: albuterol (PROVENTIL HFA,VENTOLIN HFA) 90 mcg/act inhaler    Dose/Frequency: Inhale 1-2 puffs every 6 (six) hours as needed for wheezing    Quantity: 6.7g    Pharmacy: [id number] - [place], [place] - [address]    Office:   [x] PCP/Provider - Martinez Singh, DO ordered last, asking Dr. Chowdary to assume responsibility   [] Speciality/Provider -     Does the patient have enough for 3 days?   [] Yes   [] No - Send as HP to POD

## 2025-04-08 ENCOUNTER — TELEPHONE (OUTPATIENT)
Age: 35
End: 2025-04-08

## 2025-04-08 DIAGNOSIS — N52.9 ERECTILE DYSFUNCTION, UNSPECIFIED ERECTILE DYSFUNCTION TYPE: ICD-10-CM

## 2025-04-08 RX ORDER — TADALAFIL 20 MG/1
20 TABLET ORAL DAILY PRN
Qty: 10 TABLET | Refills: 3 | Status: SHIPPED | OUTPATIENT
Start: 2025-04-08

## 2025-04-08 RX ORDER — ALBUTEROL SULFATE 90 UG/1
1-2 INHALANT RESPIRATORY (INHALATION) EVERY 6 HOURS PRN
Qty: 6.7 G | Refills: 3 | Status: SHIPPED | OUTPATIENT
Start: 2025-04-08

## 2025-04-08 RX ORDER — TADALAFIL 20 MG/1
TABLET ORAL
Qty: 90 TABLET | Refills: 0 | Status: SHIPPED | OUTPATIENT
Start: 2025-04-08 | End: 2025-04-08 | Stop reason: SDUPTHER

## 2025-04-08 RX ORDER — TADALAFIL 5 MG/1
5 TABLET ORAL EVERY MORNING
Qty: 90 TABLET | Refills: 0 | Status: SHIPPED | OUTPATIENT
Start: 2025-04-08 | End: 2025-04-08 | Stop reason: SDUPTHER

## 2025-04-08 RX ORDER — TADALAFIL 5 MG/1
5 TABLET ORAL EVERY MORNING
Qty: 90 TABLET | Refills: 0 | Status: SHIPPED | OUTPATIENT
Start: 2025-04-08

## 2025-04-08 NOTE — TELEPHONE ENCOUNTER
PA for tadalafil (CIALIS) 5 MG tablet SUBMITTED to [employer]    via    []CMM-KEY:   [x][employer]-Case ID # [id number]   []Availity-Auth ID # NDC #   []Faxed to plan   []Other website   []Phone call Case ID #     [x]PA sent as URGENT    All office notes, labs and other pertaining documents and studies sent. Clinical questions answered. Awaiting determination from insurance company.     Turnaround time for your insurance to make a decision on your Prior Authorization can take 7-21 business days.

## 2025-04-08 NOTE — TELEPHONE ENCOUNTER
PA for tadalafil (CIALIS) 20 MG tablet SUBMITTED to [employer]    via    []CMM-KEY:   [x][employer]-Case ID # [id number]   []Availity-Auth ID # NDC #   []Faxed to plan   []Other website   []Phone call Case ID #     [x]PA sent as URGENT    All office notes, labs and other pertaining documents and studies sent. Clinical questions answered. Awaiting determination from insurance company.     Turnaround time for your insurance to make a decision on your Prior Authorization can take 7-21 business days.

## 2025-07-08 DIAGNOSIS — E66.01 CLASS 2 SEVERE OBESITY DUE TO EXCESS CALORIES WITH SERIOUS COMORBIDITY AND BODY MASS INDEX (BMI) OF 35.0 TO 35.9 IN ADULT (HCC): Primary | ICD-10-CM

## 2025-07-08 DIAGNOSIS — E66.812 CLASS 2 SEVERE OBESITY DUE TO EXCESS CALORIES WITH SERIOUS COMORBIDITY AND BODY MASS INDEX (BMI) OF 35.0 TO 35.9 IN ADULT (HCC): Primary | ICD-10-CM

## 2025-07-08 RX ORDER — TIRZEPATIDE 10 MG/.5ML
10 INJECTION, SOLUTION SUBCUTANEOUS WEEKLY
Qty: 2 ML | Refills: 3 | Status: SHIPPED | OUTPATIENT
Start: 2025-07-08

## 2025-07-09 DIAGNOSIS — N52.9 ERECTILE DYSFUNCTION, UNSPECIFIED ERECTILE DYSFUNCTION TYPE: ICD-10-CM

## 2025-07-10 RX ORDER — TADALAFIL 20 MG/1
20 TABLET ORAL DAILY PRN
Qty: 10 TABLET | Refills: 3 | Status: SHIPPED | OUTPATIENT
Start: 2025-07-10

## 2025-07-10 RX ORDER — TADALAFIL 5 MG/1
5 TABLET ORAL EVERY MORNING
Qty: 90 TABLET | Refills: 0 | Status: SHIPPED | OUTPATIENT
Start: 2025-07-10

## 2025-07-25 DIAGNOSIS — B20 HIV DISEASE (HCC): ICD-10-CM

## 2025-07-25 RX ORDER — BICTEGRAVIR SODIUM, EMTRICITABINE, AND TENOFOVIR ALAFENAMIDE FUMARATE 50; 200; 25 MG/1; MG/1; MG/1
1 TABLET ORAL
Qty: 90 TABLET | Refills: 1 | Status: SHIPPED | OUTPATIENT
Start: 2025-07-25

## 2025-07-28 DIAGNOSIS — E66.09 CLASS 1 OBESITY DUE TO EXCESS CALORIES WITH SERIOUS COMORBIDITY AND BODY MASS INDEX (BMI) OF 34.0 TO 34.9 IN ADULT: Primary | ICD-10-CM

## 2025-07-28 DIAGNOSIS — E66.811 CLASS 1 OBESITY DUE TO EXCESS CALORIES WITH SERIOUS COMORBIDITY AND BODY MASS INDEX (BMI) OF 34.0 TO 34.9 IN ADULT: Primary | ICD-10-CM

## 2025-07-28 RX ORDER — TIRZEPATIDE 15 MG/.5ML
INJECTION, SOLUTION SUBCUTANEOUS
Qty: 2 ML | Refills: 3 | Status: SHIPPED | OUTPATIENT
Start: 2025-07-28

## (undated) DEVICE — TROCAR: Brand: KII® SLEEVE

## (undated) DEVICE — SUT VICRYL PLUS 0 UR-6 27IN VCP603H

## (undated) DEVICE — EXOFIN PRECISION PEN HIGH VISCOSITY TOPICAL SKIN ADHESIVE: Brand: EXOFIN PRECISION PEN, 1G

## (undated) DEVICE — ADHESIVE SKIN CLOSURE SYS EXOFIN FUSION 22CM

## (undated) DEVICE — GLOVE SRG BIOGEL ORTHOPEDIC 7.5

## (undated) DEVICE — LAPAROSCOPIC TROCAR SLEEVE/SINGLE USE: Brand: KII® OPTICAL ACCESS SYSTEM

## (undated) DEVICE — PACK PBDS LAP CHOLE RF

## (undated) DEVICE — GLOVE INDICATOR PI UNDERGLOVE SZ 8 BLUE

## (undated) DEVICE — SUT MONOCRYL 4-0 PS-2 18 IN Y496G

## (undated) DEVICE — NEEDLE 25G X 1 1/2

## (undated) DEVICE — 40601 PROLONGED POSITIONING SYSTEM: Brand: 40601 PROLONGED POSITIONING SYSTEM

## (undated) DEVICE — TROCAR: Brand: KII FIOS FIRST ENTRY

## (undated) DEVICE — INTENDED FOR TISSUE SEPARATION, AND OTHER PROCEDURES THAT REQUIRE A SHARP SURGICAL BLADE TO PUNCTURE OR CUT.: Brand: BARD-PARKER SAFETY BLADES SIZE 11, STERILE

## (undated) DEVICE — CHLORAPREP HI-LITE 26ML ORANGE

## (undated) DEVICE — TUBING SMOKE EVAC W/FILTRATION DEVICE PLUMEPORT ACTIV

## (undated) DEVICE — DRAPE LAPAROTOMY W/POUCHES